# Patient Record
Sex: MALE | Race: WHITE | NOT HISPANIC OR LATINO | Employment: OTHER | URBAN - METROPOLITAN AREA
[De-identification: names, ages, dates, MRNs, and addresses within clinical notes are randomized per-mention and may not be internally consistent; named-entity substitution may affect disease eponyms.]

---

## 2017-01-24 ENCOUNTER — OFFICE VISIT (OUTPATIENT)
Dept: FAMILY MEDICINE | Facility: CLINIC | Age: 72
End: 2017-01-24

## 2017-01-24 VITALS
SYSTOLIC BLOOD PRESSURE: 164 MMHG | HEIGHT: 69 IN | TEMPERATURE: 97.6 F | HEART RATE: 70 BPM | BODY MASS INDEX: 28.88 KG/M2 | WEIGHT: 195 LBS | DIASTOLIC BLOOD PRESSURE: 101 MMHG | OXYGEN SATURATION: 96 %

## 2017-01-24 DIAGNOSIS — E78.5 HYPERLIPIDEMIA LDL GOAL <130: ICD-10-CM

## 2017-01-24 DIAGNOSIS — Z11.59 NEED FOR HEPATITIS C SCREENING TEST: ICD-10-CM

## 2017-01-24 DIAGNOSIS — Z00.00 PREVENTATIVE HEALTH CARE: Primary | ICD-10-CM

## 2017-01-24 DIAGNOSIS — R73.03 PREDIABETES: ICD-10-CM

## 2017-01-24 DIAGNOSIS — Z12.5 SCREENING FOR PROSTATE CANCER: ICD-10-CM

## 2017-01-24 DIAGNOSIS — B95.8 STAPHYLOCOCCUS INFECTION OF NOSE: ICD-10-CM

## 2017-01-24 DIAGNOSIS — J34.89 STAPHYLOCOCCUS INFECTION OF NOSE: ICD-10-CM

## 2017-01-24 DIAGNOSIS — Z13.6 SCREENING FOR HYPERTENSION: ICD-10-CM

## 2017-01-24 LAB
ALT SERPL W P-5'-P-CCNC: 36 U/L (ref 0–70)
AST SERPL W P-5'-P-CCNC: 19 U/L (ref 0–45)
BUN SERPL-MCNC: 13 MG/DL (ref 7–30)
CALCIUM SERPL-MCNC: 9.2 MG/DL (ref 8.5–10.4)
CHLORIDE SERPLBLD-SCNC: 100 MMOL/L (ref 94–109)
CHOLEST SERPL-MCNC: 204 MG/DL
CO2 SERPL-SCNC: 29 MMOL/L (ref 20–32)
CREAT SERPL-MCNC: 1.2 MG/DL (ref 0.8–1.5)
EGFR CALCULATED (BLACK REFERENCE): 77.2
EGFR CALCULATED (NON BLACK REFERENCE): 63.8
GLUCOSE SERPL-MCNC: 105 MG/DL (ref 70–99)
GLUCOSE SERPL-MCNC: 120 MG/DL (ref 60–109)
HBA1C MFR BLD: 7 % (ref 4.1–5.7)
HDLC SERPL-MCNC: 47 MG/DL
LDLC SERPL CALC-MCNC: 125 MG/DL
NONHDLC SERPL-MCNC: 158 MG/DL
POTASSIUM SERPL-SCNC: 4.5 MMOL/L (ref 3.4–5.3)
PSA SERPL-ACNC: 4.45 UG/L (ref 0–4)
SODIUM SERPL-SCNC: 133 MMOL/L (ref 133–144)
TRIGL SERPL-MCNC: 162 MG/DL

## 2017-01-24 RX ORDER — MUPIROCIN CALCIUM 20 MG/G
CREAM TOPICAL
Qty: 15 G | Refills: 0 | Status: SHIPPED | OUTPATIENT
Start: 2017-01-24 | End: 2017-01-25 | Stop reason: ALTCHOICE

## 2017-01-24 ASSESSMENT — PAIN SCALES - GENERAL: PAINLEVEL: NO PAIN (0)

## 2017-01-24 NOTE — NURSING NOTE
"71 year old  Chief Complaint   Patient presents with     Physical     Fall Risk Assessment questions.       Blood pressure 154/97, pulse 70, temperature 97.6  F (36.4  C), height 5' 9.06\" (175.4 cm), weight 195 lb (88.451 kg), SpO2 96 %. Body mass index is 28.75 kg/(m^2).  Patient Active Problem List   Diagnosis     Preventative health care     Hyperlipidemia LDL goal <130     Prediabetes       Wt Readings from Last 2 Encounters:   01/24/17 195 lb (88.451 kg)   01/21/16 192 lb 12 oz (87.431 kg)     BP Readings from Last 3 Encounters:   01/24/17 154/97   01/21/16 133/88   10/13/15 132/85         Current Outpatient Prescriptions   Medication     Bilberry, Vaccinium myrtillus, 150 MG CAPS     omeprazole (PRILOSEC) 20 MG capsule     pravastatin (PRAVACHOL) 20 MG tablet     Cetirizine HCl (ZYRTEC ALLERGY PO)     GLUCOSAMINE CHONDROITIN COMPLX PO     Cholecalciferol (VITAMIN D) 2000 UNITS tablet     aspirin 81 MG tablet     No current facility-administered medications for this visit.       Social History   Substance Use Topics     Smoking status: Never Smoker      Smokeless tobacco: Not on file     Alcohol Use: Yes       Health Maintenance Due   Topic Date Due     ADVANCE DIRECTIVE PLANNING Q5 YRS (NO INBASKET)  08/01/1965     HEPATITIS C SCREENING  08/01/1965     FALL RISK ASSESSMENT  08/01/2012     AORTIC ANEURYSM SCREENING (SYSTEM ASSIGNED)  08/01/2012     PNEUMOCOCCAL (2 of 2 - PCV13) 08/23/2014     INFLUENZA VACCINE (SYSTEM ASSIGNED)  09/01/2016       No results found for this basename: pap      January 24, 2017 12:39 PM  "

## 2017-01-24 NOTE — PROGRESS NOTES
"CHIEF COMPLAINT:  Physical exam.      HISTORY OF PRESENT ILLNESS:  Agustin is a 71-year-old gentleman here for the above.  Overall, he is doing very well.  He is enjoying halfway.  He has his first grandchild who is just over a year of age.  His mom, also here for her physical exam right after him, is now 94 years old and is living in [facility] in [place].  She is doing quite well.      Agustin has no history of hypertension but he sure had it today.  Initial BP was 154/97 and second was 164/101.  He is completely asymptomatic.  The last 2 readings checked over the last 15 months were 132-133/85-88.  He has 2 cuffs at home, but has not been checking his blood pressure.  He will do so as outlined below.  He would prefer to stay off medication if possible.      He has hyperlipidemia, on Pravastatin 20 mg once daily.  He also has some prediabetes that we have not treated at this point.  We will see what his A1c is today.      He would like me to do a careful skin survey.  He has a number of skin changes.  I had recommended about a year ago that he buy one of the over-the-counter liquid nitrogen kits to treat a few of these lesions (all seborrheic keratoses), and he bought it but never used it.  He may try and do it this year.      He has had no falls at home, whatsoever.  He is very physically active.  No adaptive devices.  Depression screening is negative.  He is able to do all activities of daily living.  He owns a gun.  He is not wearing it today.  No significant memory concerns.  The only time it comes up is if he is trying to remember an actor's name or a film that he saw.  This is really the only part of his daily life that is affected by that.      HEALTHCARE MAINTENANCE:  He has new glasses.  He gets his eyes checked on a regular basis.  No increased intraocular pressure or early cataracts at this point.  He has bilateral hearing aids.  They are \"never great,\" but they seem to work okay.  Nose is " "irritated at times.  He gets some \"chunks\" out of there and then it can bleed.  I will take a look.  Dental care is up-to-date.  He needs to have 4 of his lower teeth taken care of soon and will be doing so.  Blood pressure 154/97 and then 164/101 as mentioned.  Body mass index is 28.75.  Weight is up 3 pounds from a year ago.  Immunizations reviewed.  Completely up-to-date.  He already had a flu shot this year at [facility].  Labs will consist of a hepatitis C antibody for 1 time screening purposes, A1c due to the prediabetes, PSA 50 for screening purposes, basic metabolic panel due to his elevated blood pressure and a lipid panel plus for monitoring purposes.  Colon cancer screening is up-to-date until 10/2020.  Hep C screening is mentioned already.      REVIEW OF SYSTEMS:  A 10-point review is negative.      OBJECTIVE:  Agustin is in absolutely no distress.  Breathing comfortably.  Good affect.   VITAL SIGNS:  -164/ on 2 occasions, in that order.  We did not check a third time as he was drinking coffee after he had it checked.  Pulse 70 and regular.  Temperature is 97.6.  He is 5 feet 9 and weighs 185 with a BMI of 28.75.  O2 sats are 96% on room air.   HEENT:  He has no pain with palpation of the frontal or maxillary sinuses.  Nose reveals a sore, red, slightly crusty appearance on the septum bilaterally.  I can tell that he had been bleeding not that long ago.  The nose otherwise looks fine.  Ears look fine.  He removed his hearing aids and there is absolutely no wax.  Teeth in good repair.  Mucous membranes are moist.  Throat looks benign.  Neck is supple, free of any adenopathy or thyromegaly.  No carotid bruits are heard, no JVD.   LUNGS:  Clear to auscultation bilaterally.   HEART:  Reveals a regular rate and rhythm without murmur.   EXTREMITIES:  Ankles are free of any edema.  Lower legs are mostly hairless.  He has outstanding dorsalis pedis pulses bilaterally on his feet.   SKIN:  Careful examination " of the legs reveals no unusual lesions.  Trunk reveals multiple seborrheic keratoses, most on his back.  He has a couple on his left temple/forehead region.  The largest one or the most concerning one for him is a large, dark brown, well-demarcated, slightly raised and dry lesion anterior to the trapezius on his left, behind the collarbone.      LABS:  Pending.      ASSESSMENT AND PLAN:   1.  Adult physical exam, up-to-date with healthcare maintenance strategies.   2.  Hepatitis C antibody for 1 time screening purposes.   3.  History of hyperlipidemia, on Pravachol, results pending.   4.  History of prediabetes.  A1c pending.   5.  Skin survey carried out.  Multiple seborrheic keratoses are seen but nothing suspicious.  He was reassured by that.   6.  Elevated blood pressure, likely hypertension.  He is going to check at home and let me know after he has obtained 12-14 data points.  Based on that, we can decide next steps.  We will likely start lisinopril 5 mg once daily, have him check, and make adjustments every 2-4 weeks.   7.  Staph overgrowth/infection of the nose.  Bactroban (mupirocin) 2% cream sent in, a small amount in each nostril, especially along the septum, nightly for the next 14 days.  He will let me know how that works.  Call with any problems or questions.

## 2017-01-24 NOTE — MR AVS SNAPSHOT
After Visit Summary   1/24/2017    Agustin Nolan    MRN: [id number]           Patient Information     Date Of Birth          1945        Visit Information        Provider Department      1/24/2017 1:00 PM Vicente Tomlin MD [facility]        Today's Diagnoses     Preventative health care    -  1     Need for hepatitis C screening test         Hyperlipidemia LDL goal <130         Prediabetes         Screening for prostate cancer         Screening for hypertension         Staphylococcus infection of nose           Care Instructions      Preventive Health Recommendations:       Male Ages 65 and over    Yearly exam:             See your health care provider every year in order to  o   Review health changes.   o   Discuss preventive care.    o   Review your medicines if your doctor has prescribed any.    Talk with your health care provider about whether you should have a test to screen for prostate cancer (PSA).    Every 3 years, have a diabetes test (fasting glucose). If you are at risk for diabetes, you should have this test more often.    Every 5 years, have a cholesterol test. Have this test more often if you are at risk for high cholesterol or heart disease.     Every 10 years, have a colonoscopy. Or, have a yearly FIT test (stool test). These exams will check for colon cancer.    Talk to with your health care provider about screening for Abdominal Aortic Aneurysm if you have a family history of AAA or have a history of smoking.  Shots:     Get a flu shot each year.     Get a tetanus shot every 10 years.     Talk to your doctor about your pneumonia vaccines. There are now two you should receive - Pneumovax (PPSV 23) and Prevnar (PCV 13).    Talk to your doctor about a shingles vaccine.     Talk to your doctor about the hepatitis B vaccine.  Nutrition:     Eat at least 5 servings of fruits and vegetables each day.     Eat whole-grain bread, whole-wheat pasta and brown rice instead of  white grains and rice.     Talk to your doctor about Calcium and Vitamin D.   Lifestyle    Exercise for at least 150 minutes a week (30 minutes a day, 5 days a week). This will help you control your weight and prevent disease.     Limit alcohol to one drink per day.     No smoking.     Wear sunscreen to prevent skin cancer.     See your dentist every six months for an exam and cleaning.     See your eye doctor every 1 to 2 years to screen for conditions such as glaucoma, macular degeneration and cataracts.        Follow-ups after your visit        Follow-up notes from your care team     Return if symptoms worsen or fail to improve.      Who to contact     Please call your clinic at [phone] to:    Ask questions about your health    Make or cancel appointments    Discuss your medicines    Learn about your test results    Speak to your doctor   If you have compliments or concerns about an experience at your clinic, or if you wish to file a complaint, please contact [facility] Patient Relations at [phone] or email us at [email or web address]         Additional Information About Your Visit        [employer] [place] Information     [employer] gives you secure access to your electronic health record. If you see a primary care provider, you can also send messages to your care team and make appointments. If you have questions, please call your primary care clinic.  If you do not have a primary care provider, please call [phone] and they will assist you.      [employer] is an electronic gateway that provides easy, online access to your medical records. With [employer]oo, you can request a clinic appointment, read your test results, renew a prescription or communicate with your care team.     To access your existing account, please contact your [facility] or call [phone] for assistance.        Care EveryWhere ID     This is your Care EveryWhere ID. This could be  "used by other organizations to access your [place] medical records  [id number]        Your Vitals Were     Pulse Temperature Height BMI (Body Mass Index) Pulse Oximetry       70 97.6  F (36.4  C) 5' 9.05\" (175.4 cm) 28.75 kg/m2 96%        Blood Pressure from Last 3 Encounters:   01/24/17 164/101   01/21/16 133/88   10/13/15 132/85    Weight from Last 3 Encounters:   01/24/17 195 lb (88.451 kg)   01/21/16 192 lb 12 oz (87.431 kg)   10/13/15 184 lb (83.462 kg)              We Performed the Following     Basic Metabolic Panel ([place])     Hemoglobin A1c ([place])     Hepatitis C Screen Reflex to HCV RNA Quant and Genotype     Lipid Panel Plus ([place])     Prostate spec antigen screen          Today's Medication Changes          These changes are accurate as of: 1/24/17  2:58 PM.  If you have any questions, ask your nurse or doctor.               Start taking these medicines.        Dose/Directions    mupirocin 2 % cream   Commonly known as:  BACTROBAN   Used for:  Staphylococcus infection of nose   Started by:  Vicente Tomlin MD        Place small amount in each nostril nightly for 14 days   Quantity:  15 g   Refills:  0            Where to get your medicines      These medications were sent to [facility]/pharmacy #[id number] [place], [place] - [address]  [address] [address]     Phone:  [phone]    - mupirocin 2 % cream             Primary Care Provider Office Phone # Fax #    Vicente Tomlin [phone]       [place] [address] [facility] [address]        Thank you!     Thank you for choosing [facility]  for your care. Our goal is always to provide you with excellent care. Hearing back from our patients is one way we can continue to improve our services. Please take a few minutes to complete the written survey that you may receive in the mail after your visit with us. Thank you!             Your Updated Medication List - Protect others around " you: Learn how to safely use, store and throw away your medicines at www.disposemymeds.org.          This list is accurate as of: 1/24/17  2:58 PM.  Always use your most recent med list.                   Brand Name Dispense Instructions for use    aspirin 81 MG tablet      Take 1 tablet by mouth daily       Bilberry (Vaccinium myrtillus) 150 MG Caps      Take 160 mg by mouth       GLUCOSAMINE CHONDROITIN COMPLX PO      Take by mouth 2 times daily       mupirocin 2 % cream    BACTROBAN    15 g    Place small amount in each nostril nightly for 14 days       omeprazole 20 MG CR capsule    priLOSEC    90 capsule    Take 1 capsule (20 mg) by mouth daily       pravastatin 20 MG tablet    PRAVACHOL    90 tablet    Take 1 tablet (20 mg) by mouth daily       vitamin D 2000 UNITS tablet      Take 1 tablet by mouth daily       ZYRTEC ALLERGY PO

## 2017-01-24 NOTE — NURSING NOTE
"71 year old  Chief Complaint   Patient presents with     Physical       Blood pressure 154/97, pulse 70, temperature 97.6  F (36.4  C), height 5' 9.06\" (175.4 cm), weight 195 lb (88.451 kg), SpO2 96 %. Body mass index is 28.75 kg/(m^2).  Patient Active Problem List   Diagnosis     Preventative health care     Hyperlipidemia LDL goal <130     Prediabetes       Wt Readings from Last 2 Encounters:   01/24/17 195 lb (88.451 kg)   01/21/16 192 lb 12 oz (87.431 kg)     BP Readings from Last 3 Encounters:   01/24/17 154/97   01/21/16 133/88   10/13/15 132/85         Current Outpatient Prescriptions   Medication     Bilberry, Vaccinium myrtillus, 150 MG CAPS     omeprazole (PRILOSEC) 20 MG capsule     pravastatin (PRAVACHOL) 20 MG tablet     Cetirizine HCl (ZYRTEC ALLERGY PO)     GLUCOSAMINE CHONDROITIN COMPLX PO     Cholecalciferol (VITAMIN D) 2000 UNITS tablet     aspirin 81 MG tablet     No current facility-administered medications for this visit.       Social History   Substance Use Topics     Smoking status: Never Smoker      Smokeless tobacco: Not on file     Alcohol Use: Yes       Health Maintenance Due   Topic Date Due     ADVANCE DIRECTIVE PLANNING Q5 YRS (NO INBASKET)  08/01/1965     HEPATITIS C SCREENING  08/01/1965     FALL RISK ASSESSMENT  08/01/2012     AORTIC ANEURYSM SCREENING (SYSTEM ASSIGNED)  08/01/2012     PNEUMOCOCCAL (2 of 2 - PCV13) 08/23/2014     INFLUENZA VACCINE (SYSTEM ASSIGNED)  09/01/2016       No results found for this basename: pap      January 24, 2017 12:39 PM  "

## 2017-01-25 DIAGNOSIS — J34.89 STAPHYLOCOCCUS INFECTION OF NOSE: Primary | ICD-10-CM

## 2017-01-25 DIAGNOSIS — B95.8 STAPHYLOCOCCUS INFECTION OF NOSE: Primary | ICD-10-CM

## 2017-01-25 LAB — HCV AB SERPL QL IA: NORMAL

## 2017-02-03 DIAGNOSIS — E78.5 HYPERLIPIDEMIA LDL GOAL <100: ICD-10-CM

## 2017-02-03 DIAGNOSIS — E78.49 OTHER HYPERLIPIDEMIA: Primary | ICD-10-CM

## 2017-02-03 RX ORDER — PRAVASTATIN SODIUM 40 MG
40 TABLET ORAL DAILY
Qty: 90 TABLET | Refills: 3 | Status: SHIPPED | OUTPATIENT
Start: 2017-02-03 | End: 2017-05-12

## 2017-02-03 NOTE — TELEPHONE ENCOUNTER
Per Sada, recent  Lipid panel reviewed, he would like to increase to 40 mg daily.  To give 90 day supply with refills for a year per MD.  No need for repeat labs work.    Aisha Crouch RN  February 3, 2017 10:00 AM

## 2017-02-24 DIAGNOSIS — K21.9 GASTROESOPHAGEAL REFLUX DISEASE, ESOPHAGITIS PRESENCE NOT SPECIFIED: ICD-10-CM

## 2017-03-10 ENCOUNTER — DOCUMENTATION ONLY (OUTPATIENT)
Dept: VASCULAR SURGERY | Facility: CLINIC | Age: 72
End: 2017-03-10

## 2017-03-10 DIAGNOSIS — Z13.6 ENCOUNTER FOR ABDOMINAL AORTIC ANEURYSM (AAA) SCREENING: Primary | ICD-10-CM

## 2017-03-13 ENCOUNTER — OFFICE VISIT (OUTPATIENT)
Dept: FAMILY MEDICINE | Facility: CLINIC | Age: 72
End: 2017-03-13

## 2017-03-13 VITALS
WEIGHT: 196.75 LBS | BODY MASS INDEX: 29.01 KG/M2 | HEART RATE: 81 BPM | DIASTOLIC BLOOD PRESSURE: 105 MMHG | OXYGEN SATURATION: 96 % | SYSTOLIC BLOOD PRESSURE: 148 MMHG | TEMPERATURE: 97.6 F

## 2017-03-13 DIAGNOSIS — R03.0 ELEVATED BLOOD PRESSURE READING WITHOUT DIAGNOSIS OF HYPERTENSION: Primary | ICD-10-CM

## 2017-03-13 DIAGNOSIS — E78.5 HYPERLIPIDEMIA LDL GOAL <130: ICD-10-CM

## 2017-03-13 DIAGNOSIS — E11.9 TYPE 2 DIABETES MELLITUS WITHOUT COMPLICATION, WITHOUT LONG-TERM CURRENT USE OF INSULIN (H): ICD-10-CM

## 2017-03-13 DIAGNOSIS — R73.03 PREDIABETES: ICD-10-CM

## 2017-03-13 LAB
ALT SERPL W P-5'-P-CCNC: 30 U/L (ref 0–70)
AST SERPL W P-5'-P-CCNC: 15 U/L (ref 0–45)
CHOLEST SERPL-MCNC: 199 MG/DL
GLUCOSE SERPL-MCNC: 203 MG/DL (ref 70–99)
HBA1C MFR BLD: 6.9 % (ref 4.1–5.7)
HDLC SERPL-MCNC: 40 MG/DL
LDLC SERPL CALC-MCNC: 98 MG/DL
NONHDLC SERPL-MCNC: 159 MG/DL
TRIGL SERPL-MCNC: 305 MG/DL

## 2017-03-13 ASSESSMENT — PAIN SCALES - GENERAL: PAINLEVEL: NO PAIN (0)

## 2017-03-13 NOTE — MR AVS SNAPSHOT
After Visit Summary   3/13/2017    Agustin Nolan    MRN: [id number]           Patient Information     Date Of Birth          1945        Visit Information        Provider Department      3/13/2017 9:20 AM Vicente Tomlin MD [facility]        Today's Diagnoses     Prediabetes    -  1    Hyperlipidemia LDL goal <130           Follow-ups after your visit        Who to contact     Please call your clinic at [phone] to:    Ask questions about your health    Make or cancel appointments    Discuss your medicines    Learn about your test results    Speak to your doctor   If you have compliments or concerns about an experience at your clinic, or if you wish to file a complaint, please contact [facility] Patient Relations at [phone] or email us at [email or web address]         Additional Information About Your Visit        MyChart Information     [employer] gives you secure access to your electronic health record. If you see a primary care provider, you can also send messages to your care team and make appointments. If you have questions, please call your primary care clinic.  If you do not have a primary care provider, please call [phone] and they will assist you.      [employer] is an electronic gateway that provides easy, online access to your medical records. With [employer], you can request a clinic appointment, read your test results, renew a prescription or communicate with your care team.     To access your existing account, please contact your [facility] or call [phone] for assistance.        Care EveryWhere ID     This is your Care EveryWhere ID. This could be used by other organizations to access your [place] medical records  [id number]        Your Vitals Were     Pulse Temperature Pulse Oximetry BMI (Body Mass Index)          81 97.6  F (36.4  C) (Oral) 96% 29.01 kg/m2         Blood Pressure from Last 3  Encounters:   03/13/17 (!) 148/105   01/24/17 (!) 164/101   01/21/16 133/88    Weight from Last 3 Encounters:   03/13/17 196 lb 12 oz (89.2 kg)   01/24/17 195 lb (88.5 kg)   01/21/16 192 lb 12 oz (87.4 kg)              We Performed the Following     ALT     AST     Glucose     Hemoglobin A1c ([place])     Lipid Profile        Primary Care Provider Office Phone # Fax #    Vicente Tomlin [phone]       [facility] [address] [place] [address]        Thank you!     Thank you for choosing [facility]  for your care. Our goal is always to provide you with excellent care. Hearing back from our patients is one way we can continue to improve our services. Please take a few minutes to complete the written survey that you may receive in the mail after your visit with us. Thank you!             Your Updated Medication List - Protect others around you: Learn how to safely use, store and throw away your medicines at www.disposemymeds.org.          This list is accurate as of: 3/13/17 10:42 AM.  Always use your most recent med list.                   Brand Name Dispense Instructions for use    aspirin 81 MG tablet      Take 1 tablet by mouth daily       Bilberry (Vaccinium myrtillus) 150 MG Caps      Take 160 mg by mouth       GLUCOSAMINE CHONDROITIN COMPLX PO      Take by mouth 2 times daily       mupirocin 2 % nasal ointment    BACTROBAN NASAL    10 g    Apply small amount in each nostril nightly X 14 days       omeprazole 20 MG CR capsule    priLOSEC    90 capsule    Take 1 capsule (20 mg) by mouth daily       pravastatin 40 MG tablet    PRAVACHOL    90 tablet    Take 1 tablet (40 mg) by mouth daily       vitamin D 2000 UNITS tablet      Take 1 tablet by mouth daily       ZYRTEC ALLERGY PO

## 2017-03-13 NOTE — NURSING NOTE
71 year old  Chief Complaint   Patient presents with     Diabetes       Blood pressure (!) 145/92, pulse 81, temperature 97.6  F (36.4  C), temperature source Oral, weight 196 lb 12 oz (89.2 kg), SpO2 96 %. Body mass index is 29.01 kg/(m^2).  Patient Active Problem List   Diagnosis     Preventative health care     Hyperlipidemia LDL goal <130     Prediabetes       Wt Readings from Last 2 Encounters:   03/13/17 196 lb 12 oz (89.2 kg)   01/24/17 195 lb (88.5 kg)     BP Readings from Last 3 Encounters:   03/13/17 (!) 145/92   01/24/17 (!) 164/101   01/21/16 133/88         Current Outpatient Prescriptions   Medication     omeprazole (PRILOSEC) 20 MG CR capsule     pravastatin (PRAVACHOL) 40 MG tablet     mupirocin (BACTROBAN NASAL) 2 % nasal ointment     Bilberry, Vaccinium myrtillus, 150 MG CAPS     Cetirizine HCl (ZYRTEC ALLERGY PO)     GLUCOSAMINE CHONDROITIN COMPLX PO     Cholecalciferol (VITAMIN D) 2000 UNITS tablet     aspirin 81 MG tablet     No current facility-administered medications for this visit.        Social History   Substance Use Topics     Smoking status: Never Smoker     Smokeless tobacco: Not on file     Alcohol use Yes       Health Maintenance Due   Topic Date Due     ADVANCE DIRECTIVE PLANNING Q5 YRS (NO INBASKET)  08/01/1965     PNEUMOCOCCAL (2 of 2 - PCV13) 08/23/2014       No results found for: PAP      March 13, 2017 9:03 AM

## 2017-03-14 NOTE — PROGRESS NOTES
CHIEF COMPLAINT:  Followup regarding elevated blood pressure, new diagnosis of diabetes and hyperlipidemia.      HISTORY OF PRESENT ILLNESS:  Agustin is a 71-year-old gentleman who was here for a physical exam approximately 2 months ago.  At that time, it appeared he was heading toward metabolic syndrome with a brand new diagnosis of type 2 diabetes (A1c was 7.0%), hypertension (blood pressure 164/101) and elevation of his lipids.  He is on pravastatin 20 mg daily and we increased the dose to 40 mg a day.  We agreed to tackle one thing at a time.      Since that visit, he purchased a home blood pressure cuff and he has been checking it regularly.  He answered all the data into an Excel spread sheet.  Since 01/25, he has checked his blood pressure dozens of times.  He showed me both morning and evening statistics.  At this point, his morning blood pressure is averaging approximately 118/83 and his evening readings are averaging approximately 127/89.  He really does not want to take an antihypertensive medication if we can avoid it.      We went through all his labs.  We are going to recheck some labs today.  He has not made any dietary changes or exercise changes.  He knows he will need to do that.      ACTIVE MEDICAL PROBLEMS:  Reviewed.      CURRENT MEDICATIONS:     1. Reviewed.      OBJECTIVE:  Agustin is in no distress.  Blood pressure in the office today was 145/92.  Average at home much better of course.  Pulse 81.  He is afebrile with temperature 97.6.  He weighs 196 pounds.  O2 sats are 96% on room air.  BMI 29.01.      LABORATORY DATA:  Labs today will include a lipid panel plus, as well as an A1c.  He will be notified of those results.      ASSESSMENT AND PLAN:   1. Elevated blood pressure without a diagnosis of hypertension given that his averages at home are in the 120s/70s-80s.  We will continue to monitor on a regular basis.  At some point we will likely want to start low dose ACE inhibitor such as  lisinopril 5 mg once daily.     2. He had a diagnosis of prediabetes, but now it is clear that he has type 2 diabetes.  A1c came back at 6.9%.  I am going to give him 3 months to work on diet and exercise to get that less than 6.5%.  If he is unable to do that, we will start on metformin  mg once daily with breakfast and increase as needed.   3. History of hyperlipidemia.  I am happy to report that by doubling his pravastatin from 20 to 40 mg daily his total cholesterol was 199 with an LDL of 98.   4. Given everything, A1c is less than 7%.  Blood pressure at home is in the 120s/80s.  LDL was 98.  He does not smoke and he is on aspirin 81 mg daily.  Therefore, he is really at optimal care already.  Nevertheless, we will work with him closely and I will see him back in approximately 3 months.

## 2017-03-17 DIAGNOSIS — E11.9 DIABETES MELLITUS, TYPE 2 (H): Primary | ICD-10-CM

## 2017-03-30 ENCOUNTER — ALLIED HEALTH/NURSE VISIT (OUTPATIENT)
Dept: EDUCATION SERVICES | Facility: CLINIC | Age: 72
End: 2017-03-30
Payer: MEDICARE

## 2017-03-30 VITALS — BODY MASS INDEX: 28.66 KG/M2 | WEIGHT: 194.4 LBS

## 2017-03-30 DIAGNOSIS — E11.9 DIABETES MELLITUS, TYPE 2 (H): Primary | ICD-10-CM

## 2017-03-30 PROCEDURE — G0108 DIAB MANAGE TRN  PER INDIV: HCPCS

## 2017-03-30 RX ORDER — LANCETS
EACH MISCELLANEOUS
Qty: 1 BOX | Refills: 5 | Status: SHIPPED | OUTPATIENT
Start: 2017-03-30 | End: 2017-04-20

## 2017-03-30 NOTE — Clinical Note
Hi Dr. Tomlin,  Thank you for the referral.  Agustin will start checking his blood sugars and he is following up in 3 weeks.  He has already made a lot of diet & activity changes.   Thanks!   Sheryl Leon RD, LD  Diabetes Education

## 2017-03-30 NOTE — MR AVS SNAPSHOT
"              After Visit Summary   3/30/2017    Agustin Nolan    MRN: [id number]           Patient Information     Date Of Birth          1945        Visit Information        Provider Department      3/30/2017 1:00 PM  DIABETIC ED RESOURCE [facility]        Today's Diagnoses     Diabetes mellitus, type 2 (H)    -  1      Care Instructions    Goals:     1.  Aim for 3-5 carb servings at meals and 0-2 carb servings at snacks    Grams of Carbohydrate \"Carb Choices\"    15 1   30 2   45 3   60 4   75 5    Total grams of carbohydrate ÷ 15  =  carb choices                                   Example:  39 grams of carbohydrate ÷  15 =  2.6 carb choices    2.  Aim for 30 minutes of exercise 5 weekly.   Add in strength training (check out the [employer])     3.  Continue to check your blood sugar and please bring meter and logbook with you to all doctor and follow-up appointments.     4.  Try to add in some plain water!     [place] [facility] for the [facility]:  For Your Diabetes Education Appointments Call:  [phone]    For Diabetes Education Related Questions:    Phone: [phone]   [email or web address]     Shazia Leon RD, LD        myfitnesspal  calorieking    Google images (for labels)       [employer] has an over the counter brand called Relion (Prime is the meter)           Follow-ups after your visit        Your next 10 appointments already scheduled     Apr 20, 2017  1:00 PM CDT   Diabetic Education with  DIABETIC ED RESOURCE   [facility] ([facility])    [address] [address] [phone]              Who to contact     If you have questions or need follow up information about today's clinic visit or your schedule please contact [facility] directly at [phone].  Normal or non-critical lab and imaging results will be communicated to you by MyChart, letter or phone within " 4 business days after the clinic has received the results. If you do not hear from us within 7 days, please contact the clinic through [facility] or phone. If you have a critical or abnormal lab result, we will notify you by phone as soon as possible.  Submit refill requests through [facility] or call your pharmacy and they will forward the refill request to us. Please allow 3 business days for your refill to be completed.          Additional Information About Your Visit        [facility] Information     [facility] gives you secure access to your electronic health record. If you see a primary care provider, you can also send messages to your care team and make appointments. If you have questions, please call your primary care clinic.  If you do not have a primary care provider, please call [phone] and they will assist you.        Care EveryWhere ID     This is your Care EveryWhere ID. This could be used by other organizations to access your [place] medical records  [id number]         Blood Pressure from Last 3 Encounters:   03/13/17 (!) 148/105   01/24/17 (!) 164/101   01/21/16 133/88    Weight from Last 3 Encounters:   03/13/17 196 lb 12 oz (89.2 kg)   01/24/17 195 lb (88.5 kg)   01/21/16 192 lb 12 oz (87.4 kg)              Today, you had the following     No orders found for display         Today's Medication Changes          These changes are accurate as of: 3/30/17  2:22 PM.  If you have any questions, ask your nurse or doctor.               Start taking these medicines.        Dose/Directions    blood glucose monitoring lancets   Used for:  Diabetes mellitus, type 2 (H)        Use to check blood sugar 2 times daily   Quantity:  1 Box   Refills:  5       blood glucose monitoring test strip   Commonly known as:  ACCU-CHEK JHON PLUS   Used for:  Diabetes mellitus, type 2 (H)        Test blood sugar 2 times daily   Quantity:  100 each   Refills:  3            Where to get your medicines      These medications were  sent to [facility]/pharmacy #[id number] - [place], [place] - [address]  [address] [address]     Phone:  [phone]     blood glucose monitoring lancets    blood glucose monitoring test strip                Primary Care Provider Office Phone # Fax #    Vicente Tomlin [phone]       [facility] [address] [facility] [address]  [facility] [address]        Thank you!     Thank you for choosing [facility]  for your care. Our goal is always to provide you with excellent care. Hearing back from our patients is one way we can continue to improve our services. Please take a few minutes to complete the written survey that you may receive in the mail after your visit with us. Thank you!             Your Updated Medication List - Protect others around you: Learn how to safely use, store and throw away your medicines at www.disposemymeds.org.          This list is accurate as of: 3/30/17  2:22 PM.  Always use your most recent med list.                   Brand Name Dispense Instructions for use    aspirin 81 MG tablet      Take 1 tablet by mouth daily       Bilberry (Vaccinium myrtillus) 150 MG Caps      Take 160 mg by mouth       blood glucose monitoring lancets     1 Box    Use to check blood sugar 2 times daily       blood glucose monitoring test strip    ACCU-CHEK JHON PLUS    100 each    Test blood sugar 2 times daily       GLUCOSAMINE CHONDROITIN COMPLX PO      Take by mouth 2 times daily       mupirocin 2 % nasal ointment    BACTROBAN NASAL    10 g    Apply small amount in each nostril nightly X 14 days       omeprazole 20 MG CR capsule    priLOSEC    90 capsule    Take 1 capsule (20 mg) by mouth daily       pravastatin 40 MG tablet    PRAVACHOL    90 tablet    Take 1 tablet (40 mg) by mouth daily       vitamin D 2000 UNITS tablet      Take 1 tablet by mouth daily       ZYRTEC ALLERGY PO

## 2017-03-30 NOTE — PATIENT INSTRUCTIONS
"Goals:     1.  Aim for 3-5 carb servings at meals and 0-2 carb servings at snacks    Grams of Carbohydrate \"Carb Choices\"    15 1   30 2   45 3   60 4   75 5    Total grams of carbohydrate ÷ 15  =  carb choices                                   Example:  39 grams of carbohydrate ÷  15 =  2.6 carb choices    2.  Aim for 30 minutes of exercise 5 weekly.   Add in strength training (check out the [employer])     3.  Continue to check your blood sugar and please bring meter and logbook with you to all doctor and follow-up appointments.     4.  Try to add in some plain water!     [place] [facility] for the [facility] Area:  For Your Diabetes Education Appointments Call:  [phone]    For Diabetes Education Related Questions:    Phone: [phone]   [email or web address]     Shazia Leon RD, LD        myfitnesspal  calorieking    Google images (for labels)       [employer] has an over the counter brand called Relion (Prime is the meter)     "

## 2017-03-30 NOTE — PROGRESS NOTES
Agree with plan and educator may order test strips and lancets for new diagnosis of diabetes.     Kim (Hari) June, MARIELY LD CDE

## 2017-03-30 NOTE — PROGRESS NOTES
Diabetes Self Management Training: Initial Assessment Visit for Newly Diagnosed Patients (Complete AADE Goals Flowsheet)    Agustin Nolan presents today for education related to Type 2 diabetes. He is accompanied by self. Patient's diabetes management related comments/concerns: to get checking blood sugars. Patient's emotional response to diabetes: expresses readiness to learn. Patient would like this visit to be focused around the following diabetes-related behaviors and goals: Diabetes pathophysiology, Being Active and Monitoring    ASSESSMENT:  Patient Problem List and Family Medical History reviewed for relevant medical history, current medical status, and diabetes risk factors.    Current Diabetes Management per Patient:  Taking diabetes medications? no    Past Diabetes Education: Newly diagnosed    Patient glucose self monitoring as follows: BG meter taught today.   BG meter: Accu-Chek Briana Connect meter    Patient's most recent   Lab Results   Component Value Date    A1C 6.9 03/13/2017    is meeting goal of <7.0    Nutrition:  Patient eats 3 meals per day.    Changes: stopped glazed donut for breakfast.  Cut out the strawberry shake at [facility] [employer].  Added more fresh fruit     Breakfast - 2 pecan shortbread cookies, 2 cups hazelnu offee (black) with milk, 1 banana  Lunch - [employer] fish, fries, pop (diet cherry coke)  - Mondays only.     Dinner - 1 cup tuna salad peas, toasted bun, 1 hazelnut coffee (black)  with milk , 1/3 bottle cab, apple chocolate biscotti   Snacks - bisctotti OR 1 mini twix     Beverages: 5 diet cherry cokes/day and 3 cups coffee, 1-2 cups cabernet a few times a week.  Rarely drinks hard alcohol     Cultural/Adventist diet restrictions: No     Biggest Challenge to Healthy Eating: knowing what to eat and enjoys sweets     Physical Activity:    Has been going on 4.5 mile walks.   Is going to start biking.  May look into the [facility] for variety.     Diabetes Risk Factors:  family history, age  "over 45 years, hypertension, hyperlipidemia and hypertriglyceridemia    Diabetes Complications:  Not discussed today.    Vitals:  Wt 194 lb 6.4 oz (88.2 kg)  BMI 28.66 kg/m2  Estimated body mass index is 28.66 kg/(m^2) as calculated from the following:    Height as of 1/24/17: 5' 9.05\" (1.754 m).    Weight as of this encounter: 194 lb 6.4 oz (88.2 kg).   Last 3 BP:   BP Readings from Last 3 Encounters:   03/13/17 (!) 148/105   01/24/17 (!) 164/101   01/21/16 133/88       History   Smoking Status     Never Smoker   Smokeless Tobacco     Not on file       Labs:  Lab Results   Component Value Date    A1C 6.9 03/13/2017     Lab Results   Component Value Date     03/13/2017     Lab Results   Component Value Date    LDL 98 03/13/2017     HDL Cholesterol   Date Value Ref Range Status   03/13/2017 40 >39 mg/dL Final   ]  GFR Estimate   Date Value Ref Range Status   09/14/2007 65 >60 mL/min/1.7m2 Final     GFR Estimate If Black   Date Value Ref Range Status   09/14/2007 79 >60 mL/min/1.7m2 Final     Lab Results   Component Value Date    CR 1.2 01/24/2017     No results found for: MICROALBUMIN    Socio/Economic Considerations:    Support system: family    Health Beliefs and Attitudes:   Patient Activation Measure Survey Score:  No flowsheet data found.    Stage of Change: ACTION (Actively working towards change)    Diabetes knowledge and skills assessment:   Patient is knowledgeable in diabetes management concepts related to: Monitoring, being active  Patient needs further education on the following diabetes management concepts: Healthy Eating, Problem Solving, Reducing Risks and Healthy Coping  Barriers to Learning Assessment: No Barriers identified  Based on learning assessment above, most appropriate setting for further diabetes education would be: Individual setting.      Education provided today on:  AADE Self-Care Behaviors:  Healthy Eating: carbohydrate counting, consistency in amount, composition, and timing " of food intake, eating out, portion control, plate planning method and label reading  Being Active: relationship to blood glucose  Monitoring: purpose, proper technique, log and interpret results, individual blood glucose targets, frequency of monitoring, use of glucose control solution and proper sharps disposal  Patient was instructed on Accu-Chek Briana Connect meter and was able to provide an accurate return demonstration. Patient's blood glucose reading today was 138 mg/dL 2 hours after lunch.    Opportunities for ongoing education and support in diabetes-self management were discussed.  Pt verbalized understanding of concepts discussed and recommendations provided today.       Education Materials Provided:  [place] Understanding Diabetes Booklet, Safe Disposal Options for Needles & Syringes and BG Log Sheet    PLAN:  See Patient Instructions for co-developed, patient-stated behavior change goals.  AVS printed and provided to patient today.    FOLLOW-UP:  Follow-up appointment scheduled on 04/20/2017.  Chart routed to referring provider.    Ongoing plan for education and support: Diabetes Education group class(es) and Follow-up with primary care provider    Shazia Leon RD, LD   Diabetes Education    Time Spent: 90 minutes  Encounter Type: Individual    Any diabetes medication dose changes were made via the CDE Protocol and Collaborative Practice Agreement with the patient's referring provider. A copy of this encounter was shared with the provider.

## 2017-04-20 ENCOUNTER — ALLIED HEALTH/NURSE VISIT (OUTPATIENT)
Dept: EDUCATION SERVICES | Facility: CLINIC | Age: 72
End: 2017-04-20
Payer: MEDICARE

## 2017-04-20 VITALS — WEIGHT: 191 LBS | BODY MASS INDEX: 28.16 KG/M2

## 2017-04-20 DIAGNOSIS — E11.9 DIABETES MELLITUS, TYPE 2 (H): Primary | ICD-10-CM

## 2017-04-20 PROCEDURE — G0108 DIAB MANAGE TRN  PER INDIV: HCPCS

## 2017-04-20 RX ORDER — LANCETS
EACH MISCELLANEOUS
Qty: 1 BOX | Refills: 3 | Status: SHIPPED | OUTPATIENT
Start: 2017-04-20 | End: 2017-06-22 | Stop reason: ALTCHOICE

## 2017-04-20 NOTE — PATIENT INSTRUCTIONS
- Keep up the great work!    - Get your A1c checked in June and follow up with your primary doctor.     Shazia Leon RD, LD  For Diabetes Education appointments call: [phone]   For Diabetes Education Related Questions call: [phone] or email: [email or web address]

## 2017-04-20 NOTE — MR AVS SNAPSHOT
After Visit Summary   4/20/2017    Agustin Nolan    MRN: [id number]           Patient Information     Date Of Birth          1945        Visit Information        Provider Department      4/20/2017 1:00 PM  DIABETIC ED RESOURCE [facility]        Care Instructions    - Keep up the great work!    - Get your A1c checked in June and follow up with your primary doctor.     Shazia Leon RD, LD  For Diabetes Education appointments call: [phone]   For Diabetes Education Related Questions call: [phone] or email: [email or web address]            Follow-ups after your visit        Your next 10 appointments already scheduled     Jun 22, 2017  1:00 PM CDT   Diabetic Education with  DIABETIC ED RESOURCE   [facility] ([facility])    [address] [address] [phone]              Who to contact     If you have questions or need follow up information about today's clinic visit or your schedule please contact [facility] directly at [phone].  Normal or non-critical lab and imaging results will be communicated to you by MyChart, letter or phone within 4 business days after the clinic has received the results. If you do not hear from us within 7 days, please contact the clinic through MyChart or phone. If you have a critical or abnormal lab result, we will notify you by phone as soon as possible.  Submit refill requests through [employer] or call your pharmacy and they will forward the refill request to us. Please allow 3 business days for your refill to be completed.          Additional Information About Your Visit        MyChart Information     [employer] gives you secure access to your electronic health record. If you see a primary care provider, you can also send messages to your care team and make appointments. If you have questions, please call your primary care clinic.  If you do not have a primary  care provider, please call [phone] and they will assist you.        Care EveryWhere ID     This is your Care EveryWhere ID. This could be used by other organizations to access your [place] medical records  [id number]        Your Vitals Were     BMI (Body Mass Index)                   28.16 kg/m2            Blood Pressure from Last 3 Encounters:   03/13/17 (!) 148/105   01/24/17 (!) 164/101   01/21/16 133/88    Weight from Last 3 Encounters:   04/20/17 191 lb (86.6 kg)   03/30/17 194 lb 6.4 oz (88.2 kg)   03/13/17 196 lb 12 oz (89.2 kg)              Today, you had the following     No orders found for display       Primary Care Provider Office Phone # Fax #    Vicente Tomlin [phone]       [address] [address]        Thank you!     Thank you for choosing [facility]  for your care. Our goal is always to provide you with excellent care. Hearing back from our patients is one way we can continue to improve our services. Please take a few minutes to complete the written survey that you may receive in the mail after your visit with us. Thank you!             Your Updated Medication List - Protect others around you: Learn how to safely use, store and throw away your medicines at www.disposemymeds.org.          This list is accurate as of: 4/20/17  2:00 PM.  Always use your most recent med list.                   Brand Name Dispense Instructions for use    aspirin 81 MG tablet      Take 1 tablet by mouth daily       Bilberry (Vaccinium myrtillus) 150 MG Caps      Take 160 mg by mouth       blood glucose monitoring lancets     1 Box    Use to check blood sugar 2 times daily       blood glucose monitoring test strip    ACCU-CHEK JHON PLUS    100 each    Test blood sugar 2 times daily       GLUCOSAMINE CHONDROITIN COMPLX PO      Take by mouth 2 times daily       mupirocin 2 % nasal ointment    BACTROBAN NASAL    10 g    Apply small amount in  each nostril nightly X 14 days       omeprazole 20 MG CR capsule    priLOSEC    90 capsule    Take 1 capsule (20 mg) by mouth daily       pravastatin 40 MG tablet    PRAVACHOL    90 tablet    Take 1 tablet (40 mg) by mouth daily       vitamin D 2000 UNITS tablet      Take 1 tablet by mouth daily       ZYRTEC ALLERGY PO

## 2017-04-20 NOTE — PROGRESS NOTES
Diabetes Self Management Training: Individual Review Visit    Agustin Nolan presents today for evaluation of glucose control related to Type 2 diabetes.    He is accompanied by self    Patient's diabetes management related comments/concerns: No concerns.     Patient's emotional response to diabetes: expresses readiness to learn    Patient would like this visit to be focused around the following diabetes-related behaviors and goals: Healthy Eating    ASSESSMENT:  Patient Problem List and Family Medical History reviewed for relevant medical history, current medical status, and diabetes risk factors.    Current Diabetes Management per Patient:  Taking diabetes medications? NO    Past Diabetes Education: Newly diagnosed (visit 2)    Patient glucose self monitoring as follows: two times daily.   BG meter: Accu-Chek Briana Connect meter.  Patient is a numbers person and likes to check frequently and likes to compare meters.  He went and bought a Relion meter.  Medicare only covers 1 strip/day and so he will use the Relion cash pay if wanting to check more.     BG results:           BG values are: In goal  Patient's most recent   Lab Results   Component Value Date    A1C 6.9 03/13/2017    is meeting goal of <7.0  (has goal of 6.5 after 3 months)    Nutrition:  Patient eats 3 meals per day    Breakfast - 45gm carb  Lunch - italian, cheese, olive oil/bread sticks   Dinner - tilapia, veggies/starch       Beverages: is tryign to drink more water.  3-5 diet cherry sodas/day, coffee with 2oz milk     Cultural/Gnosticism diet restrictions: No     Biggest Challenge to Healthy Eating: none    Physical Activity:    Walking 4 miles/day, 4-5 times weekly.     Diabetes Complications:  Chronic Complication Prevention: Eyes: exam within in the last year? Yes  Nerve/Circulation: foot exam within the last year Yes  Heart Health: BP to goal No, LDL to goal No, Daily Aspirin Yes  Dental Health: brushing/flossing regularly Yes, dental exam  "within last year Yes  Immunizations (flu/pneumonia) up to date? Yes    Vitals:  Wt 191 lb (86.6 kg)  BMI 28.16 kg/m2  Estimated body mass index is 28.16 kg/(m^2) as calculated from the following:    Height as of 1/24/17: 5' 9.05\" (1.754 m).    Weight as of this encounter: 191 lb (86.6 kg).   Last 3 BP:   BP Readings from Last 3 Encounters:   03/13/17 (!) 148/105   01/24/17 (!) 164/101   01/21/16 133/88       History   Smoking Status     Never Smoker   Smokeless Tobacco     Not on file       Labs:  Lab Results   Component Value Date    A1C 6.9 03/13/2017     Lab Results   Component Value Date     03/13/2017     Lab Results   Component Value Date    LDL 98 03/13/2017     HDL Cholesterol   Date Value Ref Range Status   03/13/2017 40 >39 mg/dL Final   ]  GFR Estimate   Date Value Ref Range Status   09/14/2007 65 >60 mL/min/1.7m2 Final     GFR Estimate If Black   Date Value Ref Range Status   09/14/2007 79 >60 mL/min/1.7m2 Final     Lab Results   Component Value Date    CR 1.2 01/24/2017     No results found for: MICROALBUMIN    Socio/Economic Considerations:    Support system: family    Health Beliefs and Attitudes:   Patient Activation Measure Survey Score:  No flowsheet data found.    Stage of Change: ACTION (Actively working towards change)      Diabetes knowledge and skills assessment:   Patient is knowledgeable in diabetes management concepts related to: Healthy Eating, Being Active, Monitoring, Taking Medication, Problem Solving, Reducing Risks and Healthy Coping  Patient needs further education on the following diabetes management concepts: general review PRN  Barriers to Learning Assessment: No Barriers identified  Based on learning assessment above, most appropriate setting for further diabetes education would be: Individual setting.    INTERVENTION:   Completed AADE 7 today.  Patient made a follow up appointment for this summer to stay on track with carb coutnign, diet and exercise.  Will have follow " up after his next A1c.  He has 3 months to bring A1c down to a 6.5 or MD was going to look at Metformin.      Has added over 160 minutes of walking a week and reduced sugar in diet.  Anticipate an improvement in A1c.     Education provided today on:  AADE Self-Care Behaviors:  Healthy Eating: carbohydrate counting, consistency in amount, composition, and timing of food intake, heart healthy diet and label reading  Being Active: relationship to blood glucose and describe appropriate activity program.  Add in weight lifting/strength  Monitoring: frequency of monitoring  Taking Medication: action of prescribed medication  Problem Solving: when to call health care provider, medical identification and sick day arrangements  Reducing Risks: major complications of diabetes, prevention, early diagnostic measures and treatment of complications, foot care, appropriate dental care, annual eye exam, aspirin therapy, A1C - goals, relating to blood glucose levels, how often to check, lipids levels and goals and blood pressure and goals  Healthy Coping: recognize feelings about diagnosis, benefits of making appropriate lifestyle changes and utilize support systems    Opportunities for ongoing education and support in diabetes-self management were discussed.  Pt verbalized understanding of concepts discussed and recommendations provided today.       Education Materials Provided:  Heart Healthy Guidelines  Goals of Care  Taking Charge book    PLAN:  See Patient Instructions for co-developed, patient-stated behavior change goals.  AVS printed and provided to patient today.  Change test strips to once/day    FOLLOW-UP:  Follow-up appointment scheduled in June.  Follow-up as needed.   Follow-up yearly for diabetes education review.    Ongoing plan for education and support: Online diabetes websites/forums, Smartphone Constance(s), Written resources (magazines, books, etc.) and Follow-up with primary care provider    Shazia Leon RD, LD    Diabetes Education    Time Spent: 65 minutes  Encounter Type: Individual

## 2017-05-12 DIAGNOSIS — K21.9 GASTROESOPHAGEAL REFLUX DISEASE, ESOPHAGITIS PRESENCE NOT SPECIFIED: ICD-10-CM

## 2017-05-12 DIAGNOSIS — E78.5 HYPERLIPIDEMIA LDL GOAL <100: ICD-10-CM

## 2017-05-12 RX ORDER — PRAVASTATIN SODIUM 40 MG
40 TABLET ORAL DAILY
Qty: 90 TABLET | Refills: 2 | Status: SHIPPED | OUTPATIENT
Start: 2017-05-12 | End: 2018-01-30

## 2017-05-12 NOTE — TELEPHONE ENCOUNTER
Transferring to [facility] mail order  To MD to auth  OK to keep at 90 day supply with 2 refills to equal out remaining transfer of script.

## 2017-06-22 ENCOUNTER — ALLIED HEALTH/NURSE VISIT (OUTPATIENT)
Dept: EDUCATION SERVICES | Facility: CLINIC | Age: 72
End: 2017-06-22
Payer: MEDICARE

## 2017-06-22 VITALS — BODY MASS INDEX: 27.42 KG/M2 | WEIGHT: 186 LBS

## 2017-06-22 DIAGNOSIS — E11.9 DIABETES MELLITUS, TYPE 2 (H): Primary | ICD-10-CM

## 2017-06-22 PROCEDURE — G0108 DIAB MANAGE TRN  PER INDIV: HCPCS

## 2017-06-22 NOTE — PATIENT INSTRUCTIONS
Follow up with Dr. Tomlin - get new A1c     Domo/Ascensia  meter prescriptions will be sent to [facility] in [place]  -  Ask the pharmacist if that is the preferred brand.  If a different brand is preferred let us know and we can update the prescriptions again.     Shazia Leon RD, LD  For Diabetes Education appointments call: [phone]   For Diabetes Education Related Questions call: [phone] or email: [email or web address]

## 2017-06-22 NOTE — PROGRESS NOTES
"Diabetes Self Management Training: Follow-up Visit    Agustin Nolan presents today for evaluation of glucose control related to Type 2 diabetes.  He is accompanied by self.  Patient's diabetes management related comments/concerns: feels he is in a rut with eating the same foods.     Patient would like this visit to be focused around the following diabetes-related behaviors and goals:     ASSESSMENT:  Patient Problem List reviewed for relevant medical history and current medical status.    Current Diabetes Management per Patient:  Taking diabetes medications? no    Patient glucose self monitoring as follows: two times daily.   BG meter: Accu-Chek & Relion meter   BG results:            BG values are: In goal  Patient's most recent   Lab Results   Component Value Date    A1C 6.9 03/13/2017    is meeting goal of <7.0    Nutrition:  Patient eats 3 meals per day    Breakfast - 2 small cookies, coffee, banana   Lunch - great grains cereal    Dinner - tomatoes/fresh mozz/basil salad + turkey     Physical Activity:    Was walking 4 miles day.  Now working yard    Vitals:  176# on home scale    Wt Readings from Last 5 Encounters:   06/22/17 186 lb (84.4 kg)   04/20/17 191 lb (86.6 kg)   03/30/17 194 lb 6.4 oz (88.2 kg)   03/13/17 196 lb 12 oz (89.2 kg)   01/24/17 195 lb (88.5 kg)     Wt 186 lb (84.4 kg)  BMI 27.42 kg/m2  Estimated body mass index is 27.42 kg/(m^2) as calculated from the following:    Height as of 1/24/17: 5' 9.05\" (1.754 m).    Weight as of this encounter: 186 lb (84.4 kg).   Last 3 BP:   BP Readings from Last 3 Encounters:   03/13/17 (!) 148/105   01/24/17 (!) 164/101   01/21/16 133/88       History   Smoking Status     Never Smoker   Smokeless Tobacco     Not on file       Labs:  Lab Results   Component Value Date    A1C 6.9 03/13/2017     Lab Results   Component Value Date     03/13/2017     Lab Results   Component Value Date    LDL 98 03/13/2017     HDL Cholesterol   Date Value Ref Range Status "   03/13/2017 40 >39 mg/dL Final   ]  GFR Estimate   Date Value Ref Range Status   09/14/2007 65 >60 mL/min/1.7m2 Final     GFR Estimate If Black   Date Value Ref Range Status   09/14/2007 79 >60 mL/min/1.7m2 Final     Lab Results   Component Value Date    CR 1.2 01/24/2017     No results found for: MICROALBUMIN    Health Beliefs and Attitudes:   Patient Activation Measure Survey Score:  No flowsheet data found.    Stage of Change: MAINTENANCE (Working to maintain change, with risk of relapse)    Diabetes knowledge and skills assessment:   Patient is knowledgeable in diabetes management concepts related to: Healthy Eating, Being Active, Monitoring, Taking Medication, Problem Solving, Reducing Risks and Healthy Coping  Patient needs further education on the following diabetes management concepts: general review PRN  Barriers to Learning Assessment: No Barriers identified  Based on learning assessment above, most appropriate setting for further diabetes education would be: Individual setting.    INTERVENTION:  Accu chek does not appear to be covered.  Gave a nash meter and will update prescriptions for this.  Otherwise he uses Relion.   He knows he does not need to check with 2 meters, but he enjoys keeping statistics (see above).  He has noticed the Relion meter runs a bit higher and discussed the margin of error on meters.     Education provided today on:  AADE Self-Care Behaviors:  Healthy Eating: carbohydrate counting, consistency in amount, composition, and timing of food intake, weight reduction, portion control, plate planning method, label reading and reviewed different websites and resources for recipes.      Opportunities for ongoing education and support in diabetes-self management were discussed.  Pt verbalized understanding of concepts discussed and recommendations provided today.       Education Materials Provided:  Carbohydrate Counting    PLAN:  See Patient Instructions for co-developed, patient-stated  behavior change goals.  AVS printed and provided to patient today.    FOLLOW-UP:  Follow-up as needed.   Follow-up yearly for diabetes education review.    Ongoing plan for education and support: Online diabetes websites/forums and Follow-up with primary care provider    Shazia Leon RD, JOSEFINA   Diabetes Education    Time Spent: 60 minutes  Encounter Type: Individual    Any diabetes medication dose changes were made via the CDE Protocol and Collaborative Practice Agreement with the patient's primary care provider. A copy of this encounter was shared with the provider.

## 2017-06-22 NOTE — MR AVS SNAPSHOT
After Visit Summary   6/22/2017    Agustin Nolan    MRN: [id number]           Patient Information     Date Of Birth          1945        Visit Information        Provider Department      6/22/2017 1:00 PM  DIABETIC ED RESOURCE [facility]        Care Instructions    Follow up with Dr. Tomlin - get new A1c     Domo/Ascensia  meter prescriptions will be sent to [facility] in [place]  -  Ask the pharmacist if that is the preferred brand.  If a different brand is preferred let us know and we can update the prescriptions again.     Shazia Leon RD, LD  For Diabetes Education appointments call: [phone]   For Diabetes Education Related Questions call: [phone] or email: [email or web address]            Follow-ups after your visit        Who to contact     If you have questions or need follow up information about today's clinic visit or your schedule please contact [facility] directly at [phone].  Normal or non-critical lab and imaging results will be communicated to you by MyChart, letter or phone within 4 business days after the clinic has received the results. If you do not hear from us within 7 days, please contact the clinic through Zenboxhart or phone. If you have a critical or abnormal lab result, we will notify you by phone as soon as possible.  Submit refill requests through Cross Current or call your pharmacy and they will forward the refill request to us. Please allow 3 business days for your refill to be completed.          Additional Information About Your Visit        Zenboxhart Information     Cross Current gives you secure access to your electronic health record. If you see a primary care provider, you can also send messages to your care team and make appointments. If you have questions, please call your primary care clinic.  If you do not have a primary care provider, please call [phone] and they will assist you.        Care EveryWhere ID     This  is your Care EveryWhere ID. This could be used by other organizations to access your [place] medical records  [id number]         Blood Pressure from Last 3 Encounters:   03/13/17 (!) 148/105   01/24/17 (!) 164/101   01/21/16 133/88    Weight from Last 3 Encounters:   04/20/17 191 lb (86.6 kg)   03/30/17 194 lb 6.4 oz (88.2 kg)   03/13/17 196 lb 12 oz (89.2 kg)              Today, you had the following     No orders found for display       Primary Care Provider Office Phone # Fax #    Vicente Tomlin [phone]       [place] [address]        Equal Access to Services     BRENNAN MCKENZIE : Miles fuo Sokathryn, waaxda luqadaha, qaybta kaalmada adeegyada, nik carrillo . So [facility] [phone].    ATENCIÓN: Si habla español, tiene a bernstein disposición servicios gratuitos de asistencia lingüística. Llame al [phone].    We comply with applicable federal civil rights laws and [place] laws. We do not discriminate on the basis of race, color, national origin, age, disability sex, sexual orientation or gender identity.            Thank you!     Thank you for choosing [facility]  for your care. Our goal is always to provide you with excellent care. Hearing back from our patients is one way we can continue to improve our services. Please take a few minutes to complete the written survey that you may receive in the mail after your visit with us. Thank you!             Your Updated Medication List - Protect others around you: Learn how to safely use, store and throw away your medicines at www.disposemymeds.org.          This list is accurate as of: 6/22/17  1:51 PM.  Always use your most recent med list.                   Brand Name Dispense Instructions for use Diagnosis    aspirin 81 MG tablet      Take 1 tablet by mouth daily        Bilberry (Vaccinium myrtillus) 150 MG Caps      Take 160 mg by mouth        blood  glucose monitoring lancets     1 Box    Use to check blood sugar1 times daily    Diabetes mellitus, type 2 (H)       blood glucose monitoring test strip    ACCU-CHEK JHON PLUS    100 each    Test blood sugar 1 times daily    Diabetes mellitus, type 2 (H)       GLUCOSAMINE CHONDROITIN COMPLX PO      Take by mouth 2 times daily        mupirocin 2 % nasal ointment    BACTROBAN NASAL    10 g    Apply small amount in each nostril nightly X 14 days    Staphylococcus infection of nose       omeprazole 20 MG CR capsule    priLOSEC    90 capsule    Take 1 capsule (20 mg) by mouth daily    Gastroesophageal reflux disease, esophagitis presence not specified       pravastatin 40 MG tablet    PRAVACHOL    90 tablet    Take 1 tablet (40 mg) by mouth daily    Hyperlipidemia LDL goal <100       vitamin D 2000 UNITS tablet      Take 1 tablet by mouth daily        ZYRTEC ALLERGY PO

## 2017-07-14 ENCOUNTER — OFFICE VISIT (OUTPATIENT)
Dept: FAMILY MEDICINE | Facility: CLINIC | Age: 72
End: 2017-07-14

## 2017-07-14 VITALS
DIASTOLIC BLOOD PRESSURE: 83 MMHG | TEMPERATURE: 97.5 F | OXYGEN SATURATION: 97 % | SYSTOLIC BLOOD PRESSURE: 127 MMHG | WEIGHT: 186 LBS | BODY MASS INDEX: 27.55 KG/M2 | HEART RATE: 56 BPM | HEIGHT: 69 IN

## 2017-07-14 DIAGNOSIS — R73.03 PREDIABETES: ICD-10-CM

## 2017-07-14 DIAGNOSIS — Z23 NEED FOR PNEUMOCOCCAL VACCINATION: ICD-10-CM

## 2017-07-14 DIAGNOSIS — E11.9 CONTROLLED TYPE 2 DIABETES MELLITUS WITHOUT COMPLICATION, WITHOUT LONG-TERM CURRENT USE OF INSULIN (H): Primary | ICD-10-CM

## 2017-07-14 LAB
CREAT UR-MCNC: 105 MG/DL
HBA1C MFR BLD: 6.4 % (ref 4.1–5.7)
MICROALBUMIN UR-MCNC: 13 MG/L
MICROALBUMIN/CREAT UR: 12.38 MG/G CR (ref 0–17)
TSH SERPL DL<=0.005 MIU/L-ACNC: 3.72 MU/L (ref 0.4–4)

## 2017-07-14 ASSESSMENT — PAIN SCALES - GENERAL: PAINLEVEL: NO PAIN (0)

## 2017-07-14 NOTE — NURSING NOTE
"71 year old  Chief Complaint   Patient presents with     Diabetes       Blood pressure 127/83, pulse 56, temperature 97.5  F (36.4  C), height 5' 9.05\" (175.4 cm), weight 186 lb (84.4 kg), SpO2 97 %. Body mass index is 27.42 kg/(m^2).  Patient Active Problem List   Diagnosis     Preventative health care     Hyperlipidemia LDL goal <130       Wt Readings from Last 2 Encounters:   07/14/17 186 lb (84.4 kg)   06/22/17 186 lb (84.4 kg)     BP Readings from Last 3 Encounters:   07/14/17 127/83   03/13/17 (!) 148/105   01/24/17 (!) 164/101         Current Outpatient Prescriptions   Medication     blood glucose monitoring (MADAI CONTOUR NEXT) test strip     blood glucose monitoring (MADAI MICROLET) lancets     pravastatin (PRAVACHOL) 40 MG tablet     omeprazole (PRILOSEC) 20 MG CR capsule     mupirocin (BACTROBAN NASAL) 2 % nasal ointment     Bilberry, Vaccinium myrtillus, 150 MG CAPS     Cetirizine HCl (ZYRTEC ALLERGY PO)     GLUCOSAMINE CHONDROITIN COMPLX PO     Cholecalciferol (VITAMIN D) 2000 UNITS tablet     aspirin 81 MG tablet     No current facility-administered medications for this visit.        Social History   Substance Use Topics     Smoking status: Never Smoker     Smokeless tobacco: Not on file     Alcohol use Yes       Health Maintenance Due   Topic Date Due     FOOT EXAM Q1 YEAR  08/01/1946     EYE EXAM Q1 YEAR  08/01/1946     MICROALBUMIN Q1 YEAR  [birth year]/01/1946     ADVANCE DIRECTIVE PLANNING Q5 YRS  08/01/1965     TSH W/ FREE T4 REFLEX Q2 YEAR  03/08/2014     PNEUMOCOCCAL (2 of 2 - PCV13) 08/23/2014       No results found for: NABIL Benavides CMA  July 14, 2017 9:42 AM    Screening Questionnaire for Adult Immunization    Are you sick today?   No   Do you have allergies to medications, food, a vaccine component or latex?   No   Have you ever had a serious reaction after receiving a vaccination?   No   Do you have a long-term health problem with heart disease, lung disease, asthma, kidney disease, metabolic " disease (e.g. diabetes), anemia, or other blood disorder?   No   Do you have cancer, leukemia, HIV/AIDS, or any other immune system problem?   No   In the past 3 months, have you taken medications that affect  your immune system, such as prednisone, other steroids, or anticancer drugs; drugs for the treatment of rheumatoid arthritis, Crohn s disease, or psoriasis; or have you had radiation treatments?   No   Have you had a seizure, or a brain or other nervous system problem?   No   During the past year, have you received a transfusion of blood or blood     products, or been given immune (gamma) globulin or antiviral drug?   No   For women: Are you pregnant or is there a chance you could become        pregnant during the next month?   No   Have you received any vaccinations in the past 4 weeks?   No     Immunization questionnaire answers were all negative.      Given by Rima Benavides. Patient instructed to remain in clinic for 15 minutes afterwards, and to report any adverse reaction to me immediately.       Screening performed by Rima Benavides on 7/14/2017 at 9:42 AM.

## 2017-07-14 NOTE — NURSING NOTE
"71 year old  Chief Complaint   Patient presents with     Diabetes       Blood pressure 127/83, pulse 56, temperature 97.5  F (36.4  C), height 5' 9.05\" (175.4 cm), weight 186 lb (84.4 kg), SpO2 97 %. Body mass index is 27.42 kg/(m^2).  Patient Active Problem List   Diagnosis     Preventative health care     Hyperlipidemia LDL goal <130       Wt Readings from Last 2 Encounters:   07/14/17 186 lb (84.4 kg)   06/22/17 186 lb (84.4 kg)     BP Readings from Last 3 Encounters:   07/14/17 127/83   03/13/17 (!) 148/105   01/24/17 (!) 164/101         Current Outpatient Prescriptions   Medication     blood glucose monitoring (MADAI CONTOUR NEXT) test strip     blood glucose monitoring (MADAI MICROLET) lancets     pravastatin (PRAVACHOL) 40 MG tablet     omeprazole (PRILOSEC) 20 MG CR capsule     mupirocin (BACTROBAN NASAL) 2 % nasal ointment     Bilberry, Vaccinium myrtillus, 150 MG CAPS     Cetirizine HCl (ZYRTEC ALLERGY PO)     GLUCOSAMINE CHONDROITIN COMPLX PO     Cholecalciferol (VITAMIN D) 2000 UNITS tablet     aspirin 81 MG tablet     No current facility-administered medications for this visit.        Social History   Substance Use Topics     Smoking status: Never Smoker     Smokeless tobacco: Not on file     Alcohol use Yes       Health Maintenance Due   Topic Date Due     FOOT EXAM Q1 YEAR  08/01/1946     EYE EXAM Q1 YEAR  [birth year]01/1946     MICROALBUMIN Q1 YEAR  [birth year]01/1948     ADVANCE DIRECTIVE PLANNING Q5 YRS  08/01/1965     TSH W/ FREE T4 REFLEX Q2 YEAR  03/08/2014     PNEUMOCOCCAL (2 of 2 - PCV13) 08/23/2014       No results found for: NABIL Benavides CMA  July 14, 2017 9:02 AM  "

## 2017-07-14 NOTE — MR AVS SNAPSHOT
After Visit Summary   7/14/2017    Agustin Nolan    MRN: [id number]           Patient Information     Date Of Birth          1945        Visit Information        Provider Department      7/14/2017 9:20 AM Vicente Tomlin MD [facility]        Today's Diagnoses     Controlled type 2 diabetes mellitus without complication, without long-term current use of insulin (H)    -  1    Need for pneumococcal vaccination           Follow-ups after your visit        Who to contact     Please call your clinic at [phone] to:    Ask questions about your health    Make or cancel appointments    Discuss your medicines    Learn about your test results    Speak to your doctor   If you have compliments or concerns about an experience at your clinic, or if you wish to file a complaint, please contact [facility] Patient Relations at [phone] or email us at [email or web address]         Additional Information About Your Visit        MyChart Information     E[employer] gives you secure access to your electronic health record. If you see a primary care provider, you can also send messages to your care team and make appointments. If you have questions, please call your primary care clinic.  If you do not have a primary care provider, please call [phone] and they will assist you.      Pro[employer]ica is an electronic gateway that provides easy, online access to your medical records. With [employer], you can request a clinic appointment, read your test results, renew a prescription or communicate with your care team.     To access your existing account, please contact your [facility] or call [phone] for assistance.        Care EveryWhere ID     This is your Care EveryWhere ID. This could be used by other organizations to access your [place] medical records  [id number]        Your Vitals Were     Pulse Temperature Height Pulse Oximetry BMI  "(Body Mass Index)       56 97.5  F (36.4  C) 5' 9.05\" (175.4 cm) 97% 27.42 kg/m2        Blood Pressure from Last 3 Encounters:   07/14/17 127/83   03/13/17 (!) 148/105   01/24/17 (!) 164/101    Weight from Last 3 Encounters:   07/14/17 186 lb (84.4 kg)   06/22/17 186 lb (84.4 kg)   04/20/17 191 lb (86.6 kg)              We Performed the Following     ADMIN: Vaccine, Initial (13348)     Albumin Random Urine Quantitative     Hemoglobin A1c ([place])     Pneumococcal vaccine 13 valent PCV13 IM (Prevnar) [42497]     TSH with free T4 reflex        Primary Care Provider Office Phone # Fax #    Vicente Tomlin [phone]       [address] [address]        Equal Access to Services     BRENNAN MCKENZIE : Hadii jemal fuo Sokathryn, waaxda luqadaha, qaybta kaalmada adebrigidyada, nik carrillo . So [facility] [phone].    ATENCIÓN: Si habla español, tiene a bernstein disposición servicios gratuitos de asistencia lingüística. [place] al [phone].    We comply with applicable federal civil rights laws and [place] laws. We do not discriminate on the basis of race, color, national origin, age, disability sex, sexual orientation or gender identity.            Thank you!     Thank you for choosing [facility]  for your care. Our goal is always to provide you with excellent care. Hearing back from our patients is one way we can continue to improve our services. Please take a few minutes to complete the written survey that you may receive in the mail after your visit with us. Thank you!             Your Updated Medication List - Protect others around you: Learn how to safely use, store and throw away your medicines at www.disposemymeds.org.          This list is accurate as of: 7/14/17 11:21 AM.  Always use your most recent med list.                   Brand Name Dispense Instructions for use Diagnosis    aspirin 81 MG tablet      Take 1 tablet by mouth " daily        Bilberry (Vaccinium myrtillus) 150 MG Caps      Take 160 mg by mouth        blood glucose monitoring lancets     100 each    Use to test blood sugar 1 times daily    Diabetes mellitus, type 2 (H)       blood glucose monitoring test strip    MADAI CONTOUR NEXT    100 strip    Use to test blood sugar 1 times daily    Diabetes mellitus, type 2 (H)       GLUCOSAMINE CHONDROITIN COMPLX PO      Take by mouth 2 times daily        mupirocin 2 % nasal ointment    BACTROBAN NASAL    10 g    Apply small amount in each nostril nightly X 14 days    Staphylococcus infection of nose       omeprazole 20 MG CR capsule    priLOSEC    90 capsule    Take 1 capsule (20 mg) by mouth daily    Gastroesophageal reflux disease, esophagitis presence not specified       pravastatin 40 MG tablet    PRAVACHOL    90 tablet    Take 1 tablet (40 mg) by mouth daily    Hyperlipidemia LDL goal <100       vitamin D 2000 UNITS tablet      Take 1 tablet by mouth daily        ZYRTEC ALLERGY PO

## 2017-08-06 PROBLEM — R73.03 PREDIABETES: Status: ACTIVE | Noted: 2017-08-06

## 2017-08-06 PROBLEM — E11.9 CONTROLLED TYPE 2 DIABETES MELLITUS WITHOUT COMPLICATION, WITHOUT LONG-TERM CURRENT USE OF INSULIN (H): Status: RESOLVED | Noted: 2017-08-06 | Resolved: 2017-08-06

## 2017-08-06 PROBLEM — E11.9 CONTROLLED TYPE 2 DIABETES MELLITUS WITHOUT COMPLICATION, WITHOUT LONG-TERM CURRENT USE OF INSULIN (H): Status: ACTIVE | Noted: 2017-08-06

## 2017-08-06 NOTE — PROGRESS NOTES
CHIEF COMPLAINT:  Followup regarding new diagnosis of controlled type 2 diabetes.      HISTORY OF PRESENT ILLNESS:  Agustin is a 72 year old whom I last saw in March.  At the time his glucose was up, and his A1c had gone up to 6.9%.  Technically, he was a new type 2 diabetic at that time.  He really thought that he could control it by a better diet and exercise, and he has really engaged upon that.  We will go ahead and check another A1c today and see where he is at.        From a diabetic standpoint, he understands that we want to keep the A1c less than 8% and ideally less than 6.5%.  This is especially true in his case.  Blood pressure needs to be less than 140/90, and his is 127/83.  We would like to see his LDL less than 100, and his most recent level was 98.  He is not a smoker.  He is on aspirin 81 mg daily.      He had an eye exam in September that was normal.  We will check a TSH today just to make sure that that is normal.  In addition, given his age (and the fact that he has diabetes), we are going to go ahead and give PCV 13 today.  We will check a urine microalbumin.      His feet are tingling.  He has some discomfort, and walking can hurt his feet.  We examined his feet before.  He has no thick calluses.  No obvious bunions.  He is looking for a good pair of shoes, and I am going to recommend that he go to [facility], where they have a  who can work with him.        OBJECTIVE:  Agustin is in absolutely no distress.  BP is 128/83 with a pulse of 56.  Temperature is 97.5.  He is 5 feet 9 and weighs 186, exactly what he weighed on 06/22.  BMI is 27.42.  I am happy to report that his A1c dropped down from 6.9%-6.4%.  Technically, he is back in the prediabetes range rather than diabetes.  He has done this without medication, so I feel good about making the change in his official diagnosis back to prediabetes.  No other exam was done today.      ASSESSMENT/PLAN:  Recent diagnosis of controlled type 2  diabetes.  However, through diet and exercise, Agustin was able to get that down half a percentage point from 6.9%-6.4%.  Therefore, I am going to change his diagnosis back to prediabetes.  In any case, I would like to see him back in 6 months, and I would encourage him to continue with his healthy lifestyle.  If it continues to bounce back and forth, then I think at some point we should just go ahead with metformin 500 mg, extended release, once daily with breakfast and then follow him from there.  He will call with any problems or questions.

## 2017-08-30 DIAGNOSIS — Z86.19 H/O TRAVELER'S DIARRHEA: Primary | ICD-10-CM

## 2017-08-30 RX ORDER — CIPROFLOXACIN 500 MG/1
500 TABLET, FILM COATED ORAL 2 TIMES DAILY
Qty: 10 TABLET | Refills: 0 | Status: SHIPPED | OUTPATIENT
Start: 2017-08-30 | End: 2018-10-30

## 2017-10-17 ENCOUNTER — TRANSFERRED RECORDS (OUTPATIENT)
Dept: HEALTH INFORMATION MANAGEMENT | Facility: CLINIC | Age: 72
End: 2017-10-17

## 2018-01-30 DIAGNOSIS — E78.5 HYPERLIPIDEMIA LDL GOAL <100: ICD-10-CM

## 2018-01-30 DIAGNOSIS — K21.9 GASTROESOPHAGEAL REFLUX DISEASE, ESOPHAGITIS PRESENCE NOT SPECIFIED: ICD-10-CM

## 2018-01-30 RX ORDER — PRAVASTATIN SODIUM 40 MG
40 TABLET ORAL DAILY
Qty: 90 TABLET | Refills: 1 | Status: SHIPPED | OUTPATIENT
Start: 2018-01-30 | End: 2018-07-26

## 2018-01-30 NOTE — TELEPHONE ENCOUNTER
Last office visit: 07/14/2017, future appointment 03/15/2018    Prescription approved per [facility] Refill Protocol.

## 2018-03-15 ENCOUNTER — OFFICE VISIT (OUTPATIENT)
Dept: FAMILY MEDICINE | Facility: CLINIC | Age: 73
End: 2018-03-15
Payer: MEDICARE

## 2018-03-15 DIAGNOSIS — Z12.5 SCREENING FOR PROSTATE CANCER: ICD-10-CM

## 2018-03-15 DIAGNOSIS — G60.9 IDIOPATHIC PERIPHERAL NEUROPATHY: ICD-10-CM

## 2018-03-15 DIAGNOSIS — E78.5 HYPERLIPIDEMIA LDL GOAL <130: ICD-10-CM

## 2018-03-15 DIAGNOSIS — Z97.4 WEARS HEARING AID: ICD-10-CM

## 2018-03-15 DIAGNOSIS — R73.03 PREDIABETES: ICD-10-CM

## 2018-03-15 DIAGNOSIS — Z00.00 MEDICARE ANNUAL WELLNESS VISIT, SUBSEQUENT: Primary | ICD-10-CM

## 2018-03-15 LAB
ALT SERPL-CCNC: 14 U/L (ref 0–70)
ANION GAP SERPL CALCULATED.3IONS-SCNC: 9 MMOL/L (ref 3–14)
AST SERPL-CCNC: 21 U/L (ref 0–55)
BUN SERPL-MCNC: 18 MG/DL (ref 7–30)
CALCIUM SERPL-MCNC: 8.8 MG/DL (ref 8.5–10.1)
CHLORIDE SERPL-SCNC: 106 MMOL/L (ref 94–109)
CHOLEST SERPL-MCNC: 177 MG/DL (ref 0–200)
CHOLEST/HDLC SERPL: 3.7 {RATIO} (ref 0–5)
CO2 SERPL-SCNC: 24 MMOL/L (ref 20–32)
CREAT SERPL-MCNC: 0.98 MG/DL (ref 0.66–1.25)
FASTING SPECIMEN: NO
GFR SERPL CREATININE-BSD FRML MDRD: 76 ML/MIN/1.7M2
GLUCOSE SERPL-MCNC: 124 MG/DL (ref 70–99)
GLUCOSE SERPL-MCNC: 134 MG/DL (ref 60–109)
HBA1C MFR BLD: 6.4 % (ref 4.1–5.7)
HDLC SERPL-MCNC: 48 MG/DL
LDLC SERPL CALC-MCNC: 100 MG/DL (ref 0–129)
POTASSIUM SERPL-SCNC: 4.3 MMOL/L (ref 3.4–5.3)
PSA SERPL-ACNC: 5.1 UG/L (ref 0–4)
SODIUM SERPL-SCNC: 140 MMOL/L (ref 133–144)
TRIGL SERPL-MCNC: 146 MG/DL (ref 0–150)
VLDL-CHOLESTEROL: 29 (ref 7–32)

## 2018-03-15 NOTE — NURSING NOTE
"72 year old  Chief Complaint   Patient presents with     Physical       Blood pressure 145/76, pulse 62, temperature 97.7  F (36.5  C), temperature source Oral, height 5' 8.94\" (175.1 cm), weight 190 lb (86.2 kg), SpO2 95 %. Body mass index is 28.11 kg/(m^2).  Patient Active Problem List   Diagnosis     Preventative health care     Hyperlipidemia LDL goal <130     Prediabetes       Wt Readings from Last 2 Encounters:   03/15/18 190 lb (86.2 kg)   07/14/17 186 lb (84.4 kg)     BP Readings from Last 3 Encounters:   03/15/18 145/76   07/14/17 127/83   03/13/17 (!) 148/105         Current Outpatient Prescriptions   Medication     pravastatin (PRAVACHOL) 40 MG tablet     omeprazole (PRILOSEC) 20 MG CR capsule     blood glucose monitoring (MADAI CONTOUR NEXT) test strip     blood glucose monitoring (MADAI MICROLET) lancets     mupirocin (BACTROBAN NASAL) 2 % nasal ointment     Bilberry, Vaccinium myrtillus, 150 MG CAPS     Cetirizine HCl (ZYRTEC ALLERGY PO)     GLUCOSAMINE CHONDROITIN COMPLX PO     Cholecalciferol (VITAMIN D) 2000 UNITS tablet     aspirin 81 MG tablet     ciprofloxacin (CIPRO) 500 MG tablet     No current facility-administered medications for this visit.        Social History   Substance Use Topics     Smoking status: Never Smoker     Smokeless tobacco: Never Used     Alcohol use Yes       Health Maintenance Due   Topic Date Due     FOOT EXAM Q1 YEAR  08/01/1946     ADVANCE DIRECTIVE PLANNING Q5 YRS  08/01/2002     A1C Q6 MO  01/14/2018     CREATININE Q1 YEAR  01/24/2018     LIPID MONITORING Q1 YEAR  03/13/2018       No results found for: PAP      March 15, 2018 7:55 AM  "

## 2018-03-15 NOTE — MR AVS SNAPSHOT
"              After Visit Summary   3/15/2018    Agustin Nolan    MRN: [id number]           Patient Information     Date Of Birth          1945        Visit Information        Provider Department      3/15/2018 8:00 AM Vicente Tomlin MD [facility]        Today's Diagnoses     Preventative health care    -  1    Needs flu shot        Hyperlipidemia LDL goal <130        Prediabetes        Screening for prostate cancer           Follow-ups after your visit        Who to contact     Please call your clinic at [phone] to:    Ask questions about your health    Make or cancel appointments    Discuss your medicines    Learn about your test results    Speak to your doctor            Additional Information About Your Visit        MyChart Information     [employer] gives you secure access to your electronic health record. If you see a primary care provider, you can also send messages to your care team and make appointments. If you have questions, please call your primary care clinic.  If you do not have a primary care provider, please call [phone] and they will assist you.      [employer] is an electronic gateway that provides easy, online access to your medical records. With [employer], you can request a clinic appointment, read your test results, renew a prescription or communicate with your care team.     To access your existing account, please contact your [facility] or call [phone] for assistance.        Care EveryWhere ID     This is your Care EveryWhere ID. This could be used by other organizations to access your [facility] medical records  [id number]        Your Vitals Were     Pulse Temperature Height Pulse Oximetry BMI (Body Mass Index)       62 97.7  F (36.5  C) (Oral) 5' 8.94\" (175.1 cm) 95% 28.11 kg/m2        Blood Pressure from Last 3 Encounters:   03/15/18 145/76   07/14/17 127/83   03/13/17 (!) 148/105    Weight from Last 3 Encounters:   03/15/18 190 lb (86.2 " kg)   07/14/17 186 lb (84.4 kg)   06/22/17 186 lb (84.4 kg)              We Performed the Following     Basic Metabolic Panel ([place])     Hemoglobin A1c ([place])     Lipid Panel Plus ([place])     Prostate spec antigen screen        Primary Care Provider Office Phone # Fax #    Vicente Tomlin [phone]       [address] [place] [address]        Equal Access to Services     BRENNAN MCKENZIE : Hadii aad ku hadasho Soomaali, waaxda luqadaha, qaybta kaalmada adeegyada, waxay idiin hayaan adeeg kharash laluzma . So [facility] [phone].    ATENCIÓN: Si habla español, tiene a bernstein disposición servicios gratuitos de asistencia lingüística. Llame al [phone].    We comply with applicable federal civil rights laws and [place] laws. We do not discriminate on the basis of race, color, national origin, age, disability, sex, sexual orientation, or gender identity.            Thank you!     Thank you for choosing [facility]  for your care. Our goal is always to provide you with excellent care. Hearing back from our patients is one way we can continue to improve our services. Please take a few minutes to complete the written survey that you may receive in the mail after your visit with us. Thank you!             Your Updated Medication List - Protect others around you: Learn how to safely use, store and throw away your medicines at www.disposemymeds.org.          This list is accurate as of 3/15/18  8:31 AM.  Always use your most recent med list.                   Brand Name Dispense Instructions for use Diagnosis    aspirin 81 MG tablet      Take 1 tablet by mouth daily        Bilberry (Vaccinium myrtillus) 150 MG Caps      Take 160 mg by mouth        blood glucose monitoring lancets     100 each    Use to test blood sugar 1 times daily    Diabetes mellitus, type 2 (H)       blood glucose monitoring test strip    MADAI CONTOUR NEXT    100 strip    Use to test blood sugar 1 times  daily    Diabetes mellitus, type 2 (H)       ciprofloxacin 500 MG tablet    CIPRO    10 tablet    Take 1 tablet (500 mg) by mouth 2 times daily    H/O traveler's diarrhea       GLUCOSAMINE CHONDROITIN COMPLX PO      Take by mouth 2 times daily        mupirocin 2 % nasal ointment    BACTROBAN NASAL    10 g    Apply small amount in each nostril nightly X 14 days    Staphylococcus infection of nose       omeprazole 20 MG CR capsule    priLOSEC    90 capsule    Take 1 capsule (20 mg) by mouth daily    Gastroesophageal reflux disease, esophagitis presence not specified       pravastatin 40 MG tablet    PRAVACHOL    90 tablet    Take 1 tablet (40 mg) by mouth daily    Hyperlipidemia LDL goal <100       vitamin D 2000 UNITS tablet      Take 1 tablet by mouth daily        ZYRTEC ALLERGY PO

## 2018-03-20 DIAGNOSIS — R97.20 RISING PSA LEVEL: Primary | ICD-10-CM

## 2018-03-25 VITALS
WEIGHT: 190 LBS | BODY MASS INDEX: 28.14 KG/M2 | HEIGHT: 69 IN | DIASTOLIC BLOOD PRESSURE: 80 MMHG | OXYGEN SATURATION: 95 % | SYSTOLIC BLOOD PRESSURE: 114 MMHG | HEART RATE: 62 BPM | TEMPERATURE: 97.7 F

## 2018-03-25 PROBLEM — Z97.4 WEARS HEARING AID: Status: ACTIVE | Noted: 2018-03-25

## 2018-03-25 PROBLEM — Z00.00 MEDICARE ANNUAL WELLNESS VISIT, SUBSEQUENT: Status: ACTIVE | Noted: 2018-03-25

## 2018-03-25 PROBLEM — G60.9 IDIOPATHIC PERIPHERAL NEUROPATHY: Status: ACTIVE | Noted: 2018-03-25

## 2018-03-25 NOTE — PROGRESS NOTES
CHIEF COMPLAINT:  Medicare annual wellness visit, subsequent.      HISTORY OF PRESENT ILLNESS:  Agustin is a 72 year old here for the above.  Overall, he is doing very well.  He has very few concerns.  He has prediabetes and hyperlipidemia.  He has been checking his blood pressure and blood sugar readings at home, and he showed me dozens and dozens of readings that he has been keeping track of on his iPad.  He has a program that he has been using very frequently.  He has a little neuropathy in his feet, and this is long-standing.      ACTIVE MEDICAL PROBLEMS AND CURRENT MEDICATIONS:  All reviewed.      SOCIAL, FAMILY AND PAST SURGICAL HISTORIES:  Unchanged.      MEDICARE QUESTIONS:  Absolutely no problems at all with any activities of daily living.  No falls at home in the last year.  No adaptive devices used.  No symptoms of depression in the last month.  No concerns about memory loss.      HEALTHCARE MAINTENANCE:  He had cataract surgery, and he is extremely happy with the results.  He is seeing colors now that he had not seen in quite some time.  He is followed closely by Ophthalmology.  He has bilateral hearing aids.  He is having a lot of dental work done.  Blood pressure at home averages 114/80.  Blood sugars average about 119, and he is checking twice a day.  Body mass index is 28.11.  Weight is up 4 pounds from last July.  From an immunization standpoint, he had a flu shot at [facility].  He will consider getting a shingles vaccine series.  He is otherwise up-to-date.  He needs a PSA 50 today.  Lipid panel plus, A1c and basic metabolic panel pending.  Colon cancer screening is good through 2020.      REVIEW OF SYSTEMS:  A 10 point review of systems is negative.      OBJECTIVE:     GENERAL:  Agustin is in no distress.  Affect is upbeat.  He is completely alert and oriented x3.     VITAL SIGNS:  BP here is 145/76, but it is averaging about 114/80 at home.  Pulse is 62 and regular.  Temperature is 97.7.  He is 5 feet 9  and weighs 190 with a BMI of 28.11.  O2 sats are 95% on room air.   HEENT:  Head is normocephalic, atraumatic.  Ears are free of any cerumen.  The TMs look fine.  There is no pain with palpation of the frontal or maxillary sinuses.  Eyes are grossly normal.  Nose is free of any congestion or discharge.  Teeth are in good repair.  Mucous membranes are moist.  Throat looks benign.   NECK:  Supple without adenopathy or thyromegaly.  No carotid bruits are heard, no JVD.   BACK:  Smooth and straight.  No pain to percussion.  No CVA tenderness.   LUNGS:  Clear to auscultation bilaterally.   HEART:  Regular rate and rhythm without murmur.   ABDOMEN:  Benign.   EXTREMITIES:  Ankles are free of any edema.  Skin is warm and dry.  Good peripheral pulses.  Monofilament testing reveals some areas of decreased sensation.  The skin itself looks fine.  The importance of looking at his feet every night was stressed        LABORATORY DATA:  PSA 50, lipid panel plus, A1c, basic metabolic panel and urine microalbumin all pending.  Foot exam carried out as described.      ASSESSMENT/PLAN:   1.  Medicare annual wellness visit, subsequent.   2.  Screening for prostate cancer with a PSA 50.   3.  History of prediabetes with some peripheral neuropathy.  His A1c is always less than 6.5%.  We will check again today, and we will check other associated labs.    4.  History of hyperlipidemia.  Lipid panel pending.   5.  Flu shot up-to-date.   6.  Call with any problems or questions.  I look forward to seeing him back in a year for a similar exam.

## 2018-04-25 DIAGNOSIS — R97.20 ELEVATED PROSTATE SPECIFIC ANTIGEN (PSA): Primary | ICD-10-CM

## 2018-04-26 ENCOUNTER — OFFICE VISIT (OUTPATIENT)
Dept: UROLOGY | Facility: CLINIC | Age: 73
End: 2018-04-26
Payer: MEDICARE

## 2018-04-26 VITALS
HEART RATE: 73 BPM | WEIGHT: 182 LBS | BODY MASS INDEX: 26.05 KG/M2 | DIASTOLIC BLOOD PRESSURE: 82 MMHG | OXYGEN SATURATION: 93 % | HEIGHT: 70 IN | SYSTOLIC BLOOD PRESSURE: 122 MMHG

## 2018-04-26 DIAGNOSIS — R97.20 ELEVATED PROSTATE SPECIFIC ANTIGEN (PSA): ICD-10-CM

## 2018-04-26 LAB
ALBUMIN UR-MCNC: NEGATIVE MG/DL
APPEARANCE UR: CLEAR
BILIRUB UR QL STRIP: NEGATIVE
COLOR UR AUTO: YELLOW
GLUCOSE UR STRIP-MCNC: NEGATIVE MG/DL
HGB UR QL STRIP: NEGATIVE
KETONES UR STRIP-MCNC: NEGATIVE MG/DL
LEUKOCYTE ESTERASE UR QL STRIP: NEGATIVE
NITRATE UR QL: NEGATIVE
PH UR STRIP: 6 PH (ref 5–7)
PSA SERPL-MCNC: 5.2 NG/ML (ref 0–4)
SOURCE: NORMAL
SP GR UR STRIP: 1.02 (ref 1–1.03)
UROBILINOGEN UR STRIP-ACNC: 0.2 EU/DL (ref 0.2–1)

## 2018-04-26 PROCEDURE — 36415 COLL VENOUS BLD VENIPUNCTURE: CPT | Performed by: UROLOGY

## 2018-04-26 PROCEDURE — 84153 ASSAY OF PSA TOTAL: CPT | Performed by: UROLOGY

## 2018-04-26 PROCEDURE — 81003 URINALYSIS AUTO W/O SCOPE: CPT | Performed by: UROLOGY

## 2018-04-26 PROCEDURE — 99203 OFFICE O/P NEW LOW 30 MIN: CPT | Performed by: UROLOGY

## 2018-04-26 RX ORDER — CIPROFLOXACIN 500 MG/1
500 TABLET, FILM COATED ORAL 2 TIMES DAILY
Qty: 6 TABLET | Refills: 0 | Status: SHIPPED | OUTPATIENT
Start: 2018-04-26 | End: 2018-10-30

## 2018-04-26 RX ORDER — INFLUENZA A VIRUS A/VICTORIA/4897/2022 IVR-238 (H1N1) ANTIGEN (FORMALDEHYDE INACTIVATED), INFLUENZA A VIRUS A/CALIFORNIA/122/2022 SAN-022 (H3N2) ANTIGEN (FORMALDEHYDE INACTIVATED), AND INFLUENZA B VIRUS B/MICHIGAN/01/2021 ANTIGEN (FORMALDEHYDE INACTIVATED) 60; 60; 60 UG/.5ML; UG/.5ML; UG/.5ML
INJECTION, SUSPENSION INTRAMUSCULAR
Refills: 0 | COMMUNITY
Start: 2017-12-15 | End: 2022-04-13

## 2018-04-26 ASSESSMENT — PAIN SCALES - GENERAL: PAINLEVEL: NO PAIN (0)

## 2018-04-26 NOTE — PATIENT INSTRUCTIONS
Ultrasound Guided Prostate Biopsy      What is a prostate biopsy?  A prostate biopsy is a relatively painless procedure performed in the physician's office, outpatient facility or hospital.  A long, thin needle is inserted into the prostate to collect a sample of tissue from the prostate.  These samples are then examined by a pathologist for abnormal cells.    Why do I need a prostate biopsy? During a man's lifetime the prostate gradually increases in size.  The patient may or may not experience symptoms.  Symptoms of an enlarge prostate include:    Increased frequency of urination    Decreased force of urinary stream    Trouble with urination    Awakening at night to urinate    When the prostate is examined, the physician may feel a nodule (hard or firm growth) which would require a biopsy.    Another reason for having a prostate biopsy is an increase in the prostate specific androgen (PSA) in your blood.  A prostate biopsy may be necessary to determine the cause of the increased PSA.    Your physician will also perform an ultrasound (an image created by sound waves).  The ultrasound is performed by placing a small probe in the rectum to image the prosate gland.    Preparation for the Prostate Biopsy    1.  During the week before your prostate biopsy substances that may thin your blood (ASPIRIN, BABY ASPIRIN, ACETYLSALICYLIC ACID, ADVIL/MOTRIN, IBUPROFEN, ALEVE, COUMADIN (WARFARIN), PRADAXA, ELIQUIS, PLAVIX, XARELTO, BRILINTA, VITAMIN E, FISH OIL) must be discontinued.  If you are in doubt, please call.  This is a very important requirement and your procedure may be cancelled if you have not stopped taking all of these medications.    2.  If you have any bleeding problems (thin blood), please tell your doctor.    3.  If you have been told by another doctor to take antibiotics before dental work or surgery, please tell the doctor.  This is common for patients that have had a joint replacement.    YOU HAVE BEEN  PRESCRIBED AN ANTIBIOTIC.  Please follow the directions written on the bottle.  The directions usually will tell you to start the antibiotic the day before your biopsy.  You will continue taking the medication until it is gone.    The day of the biopsy you will be asked to use an enema one hour before you leave for your appointment.    Unless you have been prescribed a sedative (Valium or a similar drug) you will be able to drive to and from your appointment.  If you have taken a sedative you must have a ride.    AFTER THE BIOPSY    DANGER SIGNS    Small amount of blood in the urine for 10-14 days Excessive blood in the urine, stool or ejaculate  Small amount of blood in the stool for 48 hours Fever over 100 or chills  Small amount of blood in the ejaculate for up to Frequent urination or burning when you urinate   4 weeks          Your biopsy is scheduled on______05/10/18______________________ at our [place] office.  Please be at     The office at ___1245pm_________________.  A follow up visit has been scheduled for you on     __________05/24/18 at 1130am_____________________ at the  _______[place] office________________________.    Your doctor will discuss your results with you at this visit.    CALL THE DOCTOR'S OFFICE -[phone] IF YOU HAVE ANY OF THESE SYMPTOMS.  IF YOU CANNOT CONTACT THE OFFICE, GO TO THE EMERGENCY ROOM.    _

## 2018-04-26 NOTE — PROGRESS NOTES
[facility]  Main Office: [address]  [address]  [place] [address]       CHIEF COMPLAINT:  Elevated PSA    HISTORY:   This is a 72-year-old gentleman who has had a rising PSA since 2014. It was 3.14 and 2014. It has slowly risen since that time. It was first elevated in January 2017 when it was 4.45. Last month on his most recent check it was 5.1.     He lives in [facility] and was referred here by a friend who I have seen as a patient.  He has no prior urologic history.  He reports no urinary complaints.  He complains of no nocturia and reports normal urinary stream.  He has no history of gross hematuria or infections.  He has no family history of prostate cancer or known BRCA mutations.    In light of his history I recommended that we simply recheck the PSA today before I examined him.  On PSA recheck today the PSA is up a little further at 5.2.    He has a prior history of an undescended testicle on the left side that was removed as a child      PAST MEDICAL HISTORY:   Past Medical History:   Diagnosis Date     Esophageal reflux      Prediabetes        PAST SURGICAL HISTORY:   Past Surgical History:   Procedure Laterality Date     APPENDECTOMY       cystoscopy with pyeloscopy with removal of calculus.       ROTATOR CUFF REPAIR RT/LT       surgery testis exploration of undescended.  1985       FAMILY HISTORY: No family history on file.    SOCIAL HISTORY:   Social History   Substance Use Topics     Smoking status: Never Smoker     Smokeless tobacco: Never Used     Alcohol use Yes        No Known Allergies      Current Outpatient Prescriptions:      aspirin 81 MG tablet, Take 1 tablet by mouth daily, Disp: , Rfl:      Bilberry, Vaccinium myrtillus, 150 MG CAPS, Take 160 mg by mouth, Disp: , Rfl:      blood glucose monitoring (MADAI CONTOUR NEXT) test strip, Use to test blood sugar 1 times daily, Disp: 100 strip, Rfl: 3     blood glucose monitoring (MADAI MICROLET) lancets, Use to test blood  sugar 1 times daily, Disp: 100 each, Rfl: 3     Cetirizine HCl (ZYRTEC ALLERGY PO), , Disp: , Rfl:      Cholecalciferol (VITAMIN D) 2000 UNITS tablet, Take 1 tablet by mouth daily, Disp: , Rfl:      ciprofloxacin (CIPRO) 500 MG tablet, Take 1 tablet (500 mg) by mouth 2 times daily (Patient not taking: Reported on 3/15/2018), Disp: 10 tablet, Rfl: 0     GLUCOSAMINE CHONDROITIN COMPLX PO, Take by mouth 2 times daily, Disp: , Rfl:      mupirocin (BACTROBAN NASAL) 2 % nasal ointment, Apply small amount in each nostril nightly X 14 days, Disp: 10 g, Rfl: 0     omeprazole (PRILOSEC) 20 MG CR capsule, Take 1 capsule (20 mg) by mouth daily, Disp: 90 capsule, Rfl: 1     pravastatin (PRAVACHOL) 40 MG tablet, Take 1 tablet (40 mg) by mouth daily, Disp: 90 tablet, Rfl: 1    Review Of Systems:  Skin: negative  Eyes: negative  Ears/Nose/Throat: negative  Respiratory: No shortness of breath, dyspnea on exertion, cough, or hemoptysis  Cardiovascular: negative  Gastrointestinal: negative  Genitourinary: negative  Musculoskeletal: negative  Neurologic: negative  Psychiatric: negative  Hematologic/Lymphatic/Immunologic: negative  Endocrine: negative      PHYSICAL EXAM:    There were no vitals taken for this visit.  General appearance: In NAD, conversant  HEENT: Normocephalic and atraumatic, anicteric sclera  Cardiovascular: Not examined  Respiratory: normal, non-labored breathing  Gastrointestinal: negative, Abdomen soft, non-tender, and non-distended.   Musculoskeletal: Not Examined  Peripheral Vascular/extremity: No peripheral edema  Skin: Normal temperature, turgor, and texture. No rash  Psychiatric: Appropriate affect, alert and oriented to person, place, and time    Penis: Normal  Scrotal skin: Normal, no lesions  Testicles: Normal to palpation on the right.  Absent left testicle.  Epididymis: Normal to palpation bilaterally  Lymphatic: Normal inguinal lymph nodes  Digital Rectal Exam: His prostate is slightly enlarged but benign  and symmetric to palpation    Cystoscopy: Not done      PSA: 5.2 today, prior to examination    UA RESULTS:  No results for input(s): COLOR, APPEARANCE, URINEGLC, URINEBILI, URINEKETONE, SG, UBLD, URINEPH, PROTEIN, UROBILINOGEN, NITRITE, LEUKEST, RBCU, WBCU in the last [id number] hours.    Bladder Scan:     Other Labs:      Imaging Studies: None      CLINICAL IMPRESSION:   Elevated PSA    PLAN:   He has a consistently elevated PSA that has risen steadily over the past few years.  Given this trend I did recommend further evaluation.  We discussed MRI versus prostate biopsy as the next step in evaluation.  We discussed pros and cons of each option.  He would like to proceed with a prostate biopsy.  We discussed the risks of prostate biopsy, including bleeding and infection.  He wishes to proceed.  Ciprofloxacin prescribed for antibiotic prophylaxis.  I will see him back in the office in the near future for prostate biopsy.      Kamlesh Henry MD

## 2018-04-26 NOTE — MR AVS SNAPSHOT
After Visit Summary   4/26/2018    Agustin Nolan    MRN: [id number]           Patient Information     Date Of Birth          1945        Visit Information        Provider Department      4/26/2018 10:00 AM Kamlesh Henry MD [facility] [place]        Today's Diagnoses     Elevated prostate specific antigen (PSA)          Care Instructions    Ultrasound Guided Prostate Biopsy      What is a prostate biopsy?  A prostate biopsy is a relatively painless procedure performed in the physician's office, outpatient facility or hospital.  A long, thin needle is inserted into the prostate to collect a sample of tissue from the prostate.  These samples are then examined by a pathologist for abnormal cells.    Why do I need a prostate biopsy? During a man's lifetime the prostate gradually increases in size.  The patient may or may not experience symptoms.  Symptoms of an enlarge prostate include:    Increased frequency of urination    Decreased force of urinary stream    Trouble with urination    Awakening at night to urinate    When the prostate is examined, the physician may feel a nodule (hard or firm growth) which would require a biopsy.    Another reason for having a prostate biopsy is an increase in the prostate specific androgen (PSA) in your blood.  A prostate biopsy may be necessary to determine the cause of the increased PSA.    Your physician will also perform an ultrasound (an image created by sound waves).  The ultrasound is performed by placing a small probe in the rectum to image the prosate gland.    Preparation for the Prostate Biopsy    1.  During the week before your prostate biopsy substances that may thin your blood (ASPIRIN, BABY ASPIRIN, ACETYLSALICYLIC ACID, ADVIL/MOTRIN, IBUPROFEN, ALEVE, COUMADIN (WARFARIN), PRADAXA, ELIQUIS, PLAVIX, XARELTO, BRILINTA, VITAMIN E, FISH OIL) must be discontinued.  If you are in doubt, please call.  This is a very  important requirement and your procedure may be cancelled if you have not stopped taking all of these medications.    2.  If you have any bleeding problems (thin blood), please tell your doctor.    3.  If you have been told by another doctor to take antibiotics before dental work or surgery, please tell the doctor.  This is common for patients that have had a joint replacement.    YOU HAVE BEEN PRESCRIBED AN ANTIBIOTIC.  Please follow the directions written on the bottle.  The directions usually will tell you to start the antibiotic the day before your biopsy.  You will continue taking the medication until it is gone.    The day of the biopsy you will be asked to use an enema one hour before you leave for your appointment.    Unless you have been prescribed a sedative (Valium or a similar drug) you will be able to drive to and from your appointment.  If you have taken a sedative you must have a ride.    AFTER THE BIOPSY    DANGER SIGNS    Small amount of blood in the urine for 10-14 days Excessive blood in the urine, stool or ejaculate  Small amount of blood in the stool for 48 hours Fever over 100 or chills  Small amount of blood in the ejaculate for up to Frequent urination or burning when you urinate   4 weeks          Your biopsy is scheduled on______05/10/18______________________ at our [place] office.  Please be at     The office at ___1245pm_________________.  A follow up visit has been scheduled for you on     __________05/24/18 at 1130am_____________________ at the  _______[place] office________________________.    Your doctor will discuss your results with you at this visit.    CALL THE DOCTOR'S OFFICE [phone] IF YOU HAVE ANY OF THESE SYMPTOMS.  IF YOU CANNOT CONTACT THE OFFICE, GO TO THE EMERGENCY ROOM.    _             Follow-ups after your visit        Follow-up notes from your care team     Return for prostate biopsy.      Your next 10 appointments already scheduled     May 10, 2018  1:00 PM CDT  "  (Arrive by 12:45 PM)   Sonography/Biopsy with Kamlesh Henry MD, UA BX ROOM   [facility] [place] ([facility])    [address]  [address]  [facility] [address] [phone]            May 24, 2018 11:30 AM CDT   Return Visit with Kamlesh Henry MD   [facility] ([facility])    [address]  [address]  [facility] [address] [phone]              Who to contact     If you have questions or need follow up information about today's clinic visit or your schedule please contact [facility] [place] directly at [phone].  Normal or non-critical lab and imaging results will be communicated to you by MyChart, letter or phone within 4 business days after the clinic has received the results. If you do not hear from us within 7 days, please contact the clinic through MyChart or phone. If you have a critical or abnormal lab result, we will notify you by phone as soon as possible.  Submit refill requests through "[employer]" or call your pharmacy and they will forward the refill request to us. Please allow 3 business days for your refill to be completed.          Additional Information About Your Visit        [facility] Information     "[employer] gives you secure access to your electronic health record. If you see a primary care provider, you can also send messages to your care team and make appointments. If you have questions, please call your primary care clinic.  If you do not have a primary care provider, please call [phone] and they will assist you.        Care EveryWhere ID     This is your Care EveryWhere ID. This could be used by other organizations to access your [facility] medical records  [id number]        Your Vitals Were     Pulse Height Pulse Oximetry BMI (Body Mass Index)          73 1.778 m (5' 10\") 93% 26.11 kg/m2         Blood Pressure from Last 3 " Encounters:   04/26/18 122/82   03/25/18 114/80   07/14/17 127/83    Weight from Last 3 Encounters:   04/26/18 82.6 kg (182 lb)   03/15/18 86.2 kg (190 lb)   07/14/17 84.4 kg (186 lb)              We Performed the Following     PSA Diag Urologic Phys     UA without Microscopic          Today's Medication Changes          These changes are accurate as of 4/26/18 10:49 AM.  If you have any questions, ask your nurse or doctor.               These medicines have changed or have updated prescriptions.        Dose/Directions    * ciprofloxacin 500 MG tablet   Commonly known as:  CIPRO   This may have changed:  Another medication with the same name was added. Make sure you understand how and when to take each.   Used for:  H/O traveler's diarrhea   Changed by:  Kamlesh Henry MD        Dose:  500 mg   Take 1 tablet (500 mg) by mouth 2 times daily   Quantity:  10 tablet   Refills:  0       * ciprofloxacin 500 MG tablet   Commonly known as:  CIPRO   This may have changed:  You were already taking a medication with the same name, and this prescription was added. Make sure you understand how and when to take each.   Used for:  Elevated prostate specific antigen (PSA)   Changed by:  Kamlesh Henry MD        Dose:  500 mg   Take 1 tablet (500 mg) by mouth 2 times daily   Quantity:  6 tablet   Refills:  0       * Notice:  This list has 2 medication(s) that are the same as other medications prescribed for you. Read the directions carefully, and ask your doctor or other care provider to review them with you.         Where to get your medicines      These medications were sent to [facility]/pharmacy #[id number] [place], [place] - [address]  [address] [facility] [address]     Phone:  [phone]     ciprofloxacin 500 MG tablet                Primary Care Provider Office Phone # Fax #    Vicente Tomlin [phone]       [address] [place] [address]        Equal Access to Services      BRENNAN MCKENZIE : Hadii aad ku maru Childs, waaxda luqadaha, qaybta kaalmada rennyregina, nik britni hayashok stapletonudayrichard carrillo . [facility] [phone].    ATENCIÓN: Si habla español, tiene a bernstein disposición servicios gratuitos de asistencia lingüística. Llame al [phone].    We comply with applicable federal civil rights laws and [place] laws. We do not discriminate on the basis of race, color, national origin, age, disability, sex, sexual orientation, or gender identity.            Thank you!     Thank you for choosing [facility] [place]  for your care. Our goal is always to provide you with excellent care. Hearing back from our patients is one way we can continue to improve our services. Please take a few minutes to complete the written survey that you may receive in the mail after your visit with us. Thank you!             Your Updated Medication List - Protect others around you: Learn how to safely use, store and throw away your medicines at www.disposemymeds.org.          This list is accurate as of 4/26/18 10:49 AM.  Always use your most recent med list.                   Brand Name Dispense Instructions for use Diagnosis    aspirin 81 MG tablet      Take 1 tablet by mouth daily        Bilberry (Vaccinium myrtillus) 150 MG Caps      Take 160 mg by mouth        blood glucose monitoring lancets     100 each    Use to test blood sugar 1 times daily    Diabetes mellitus, type 2 (H)       blood glucose monitoring test strip    MADAI CONTOUR NEXT    100 strip    Use to test blood sugar 1 times daily    Diabetes mellitus, type 2 (H)       * ciprofloxacin 500 MG tablet    CIPRO    10 tablet    Take 1 tablet (500 mg) by mouth 2 times daily    H/O traveler's diarrhea       * ciprofloxacin 500 MG tablet    CIPRO    6 tablet    Take 1 tablet (500 mg) by mouth 2 times daily    Elevated prostate specific antigen (PSA)       FLUZONE HIGH-DOSE 0.5 ML injection   Generic drug:   influenza Vac Split High-Dose      TO BE ADMINISTERED BY THE PHARMACIST.        GLUCOSAMINE CHONDROITIN COMPLX PO      Take by mouth 2 times daily        mupirocin 2 % nasal ointment    BACTROBAN NASAL    10 g    Apply small amount in each nostril nightly X 14 days    Staphylococcus infection of nose       omeprazole 20 MG CR capsule    priLOSEC    90 capsule    Take 1 capsule (20 mg) by mouth daily    Gastroesophageal reflux disease, esophagitis presence not specified       pravastatin 40 MG tablet    PRAVACHOL    90 tablet    Take 1 tablet (40 mg) by mouth daily    Hyperlipidemia LDL goal <100       vitamin D 2000 units tablet      Take 1 tablet by mouth daily        ZYRTEC ALLERGY PO           * Notice:  This list has 2 medication(s) that are the same as other medications prescribed for you. Read the directions carefully, and ask your doctor or other care provider to review them with you.

## 2018-04-26 NOTE — LETTER
4/26/2018       RE: Agustin Nolan  [address]  [facility] [address]     Dear Colleague,    Thank you for referring your patient, Agustin Nolan, to the [facility] [place] at [facility]. Please see a copy of my visit note below.    [facility]  Main Office: [address]  [address]  [place] [address]       CHIEF COMPLAINT:  Elevated PSA    HISTORY:   This is a 72-year-old gentleman who has had a rising PSA since 2014. It was 3.14 and 2014. It has slowly risen since that time. It was first elevated in January 2017 when it was 4.45. Last month on his most recent check it was 5.1.     He lives in [place] [facility] and was referred here by a friend who I have seen as a patient.  He has no prior urologic history.  He reports no urinary complaints.  He complains of no nocturia and reports normal urinary stream.  He has no history of gross hematuria or infections.  He has no family history of prostate cancer or known BRCA mutations.    In light of his history I recommended that we simply recheck the PSA today before I examined him.  On PSA recheck today the PSA is up a little further at 5.2.    He has a prior history of an undescended testicle on the left side that was removed as a child      PAST MEDICAL HISTORY:   Past Medical History:   Diagnosis Date     Esophageal reflux      Prediabetes        PAST SURGICAL HISTORY:   Past Surgical History:   Procedure Laterality Date     APPENDECTOMY       cystoscopy with pyeloscopy with removal of calculus.       ROTATOR CUFF REPAIR RT/LT       surgery testis exploration of undescended.  1985       FAMILY HISTORY: No family history on file.    SOCIAL HISTORY:   Social History   Substance Use Topics     Smoking status: Never Smoker     Smokeless tobacco: Never Used     Alcohol use Yes        No Known Allergies      Current Outpatient Prescriptions:      aspirin 81 MG tablet, Take 1 tablet by  mouth daily, Disp: , Rfl:      Bilberry, Vaccinium myrtillus, 150 MG CAPS, Take 160 mg by mouth, Disp: , Rfl:      blood glucose monitoring (MADAI CONTOUR NEXT) test strip, Use to test blood sugar 1 times daily, Disp: 100 strip, Rfl: 3     blood glucose monitoring (MADAI MICROLET) lancets, Use to test blood sugar 1 times daily, Disp: 100 each, Rfl: 3     Cetirizine HCl (ZYRTEC ALLERGY PO), , Disp: , Rfl:      Cholecalciferol (VITAMIN D) 2000 UNITS tablet, Take 1 tablet by mouth daily, Disp: , Rfl:      ciprofloxacin (CIPRO) 500 MG tablet, Take 1 tablet (500 mg) by mouth 2 times daily (Patient not taking: Reported on 3/15/2018), Disp: 10 tablet, Rfl: 0     GLUCOSAMINE CHONDROITIN COMPLX PO, Take by mouth 2 times daily, Disp: , Rfl:      mupirocin (BACTROBAN NASAL) 2 % nasal ointment, Apply small amount in each nostril nightly X 14 days, Disp: 10 g, Rfl: 0     omeprazole (PRILOSEC) 20 MG CR capsule, Take 1 capsule (20 mg) by mouth daily, Disp: 90 capsule, Rfl: 1     pravastatin (PRAVACHOL) 40 MG tablet, Take 1 tablet (40 mg) by mouth daily, Disp: 90 tablet, Rfl: 1    Review Of Systems:  Skin: negative  Eyes: negative  Ears/Nose/Throat: negative  Respiratory: No shortness of breath, dyspnea on exertion, cough, or hemoptysis  Cardiovascular: negative  Gastrointestinal: negative  Genitourinary: negative  Musculoskeletal: negative  Neurologic: negative  Psychiatric: negative  Hematologic/Lymphatic/Immunologic: negative  Endocrine: negative      PHYSICAL EXAM:    There were no vitals taken for this visit.  General appearance: In NAD, conversant  HEENT: Normocephalic and atraumatic, anicteric sclera  Cardiovascular: Not examined  Respiratory: normal, non-labored breathing  Gastrointestinal: negative, Abdomen soft, non-tender, and non-distended.   Musculoskeletal: Not Examined  Peripheral Vascular/extremity: No peripheral edema  Skin: Normal temperature, turgor, and texture. No rash  Psychiatric: Appropriate affect, alert  and oriented to person, place, and time    Penis: Normal  Scrotal skin: Normal, no lesions  Testicles: Normal to palpation on the right.  Absent left testicle.  Epididymis: Normal to palpation bilaterally  Lymphatic: Normal inguinal lymph nodes  Digital Rectal Exam: His prostate is slightly enlarged but benign and symmetric to palpation    Cystoscopy: Not done      PSA: 5.2 today, prior to examination    UA RESULTS:  No results for input(s): COLOR, APPEARANCE, URINEGLC, URINEBILI, URINEKETONE, SG, UBLD, URINEPH, PROTEIN, UROBILINOGEN, NITRITE, LEUKEST, RBCU, WBCU in the last 09386 hours.    Bladder Scan:     Other Labs:      Imaging Studies: None      CLINICAL IMPRESSION:   Elevated PSA    PLAN:   He has a consistently elevated PSA that has risen steadily over the past few years.  Given this trend I did recommend further evaluation.  We discussed MRI versus prostate biopsy as the next step in evaluation.  We discussed pros and cons of each option.  He would like to proceed with a prostate biopsy.  We discussed the risks of prostate biopsy, including bleeding and infection.  He wishes to proceed.  Ciprofloxacin prescribed for antibiotic prophylaxis.  I will see him back in the office in the near future for prostate biopsy.      Again, thank you for allowing me to participate in the care of your patient.      Sincerely,    Kamlesh Henry MD

## 2018-04-26 NOTE — NURSING NOTE
Chief Complaint   Patient presents with     Elevated PSA     Pt here for elevated PSA with same day PSA     UA RESULTS:  Recent Labs   Lab Test  04/26/18   1007   COLOR  Yellow   APPEARANCE  Clear   URINEGLC  Negative   URINEBILI  Negative   URINEKETONE  Negative   SG  1.020   UBLD  Negative   URINEPH  6.0   PROTEIN  Negative   UROBILINOGEN  0.2   NITRITE  Negative   LEUKEST  Negative     UA done today is clear  Cici Joyce, CMA

## 2018-05-10 ENCOUNTER — OFFICE VISIT (OUTPATIENT)
Dept: UROLOGY | Facility: CLINIC | Age: 73
End: 2018-05-10
Payer: MEDICARE

## 2018-05-10 ENCOUNTER — TRANSFERRED RECORDS (OUTPATIENT)
Dept: HEALTH INFORMATION MANAGEMENT | Facility: CLINIC | Age: 73
End: 2018-05-10

## 2018-05-10 VITALS
HEIGHT: 70 IN | HEART RATE: 80 BPM | DIASTOLIC BLOOD PRESSURE: 84 MMHG | SYSTOLIC BLOOD PRESSURE: 128 MMHG | BODY MASS INDEX: 26.05 KG/M2 | WEIGHT: 182 LBS

## 2018-05-10 DIAGNOSIS — R97.20 ELEVATED PROSTATE SPECIFIC ANTIGEN (PSA): Primary | ICD-10-CM

## 2018-05-10 PROCEDURE — 88305 TISSUE EXAM BY PATHOLOGIST: CPT | Performed by: UROLOGY

## 2018-05-10 PROCEDURE — 76872 US TRANSRECTAL: CPT | Performed by: UROLOGY

## 2018-05-10 PROCEDURE — 55700 ZZHC BIOPSY PROSTATE NEEDLE/PUNCH: CPT | Performed by: UROLOGY

## 2018-05-10 PROCEDURE — 88305 TISSUE EXAM BY PATHOLOGIST: CPT | Mod: 76 | Performed by: UROLOGY

## 2018-05-10 ASSESSMENT — PAIN SCALES - GENERAL: PAINLEVEL: NO PAIN (0)

## 2018-05-10 NOTE — LETTER
5/10/2018       RE: Agustin Nolan  [address] [facility] [address]     Dear Colleague,    Thank you for referring your patient, Agustin Nolan, to the [facility] [place] at [facility]. Please see a copy of my visit note below.    Agustin Nolan is here for a transrectal altrasound guided needle biopsy of the prostate for a significant risk of potentially lethal prostate cancer.    The risks, benefits, of the procudure were discussed.  All questions were answered.  A written informed consent was obtained.      PSA   Date Value Ref Range Status   03/15/2018 5.10 (H) 0 - 4 ug/L Final     Comment:     Assay Method:  Chemiluminescence using Siemens Vista analyzer   01/24/2017 4.45 (H) 0 - 4 ug/L Final     Comment:     Assay Method:  Chemiluminescence using Siemens Vista analyzer   01/21/2016 3.31 0 - 4 ug/L Final   09/03/2014 3.14 0 - 4 ug/L Final       An enema was completed and 500 mg of Cipro twice daily was started prior to the biopsy.  After obtaining informed consent patient was placed in lateral decubitus position.  The ultrasound probe was placed in the rectum.  The prostate was numbed using ultrasound guidance with 1% lidocaine 5 mls along each nerve bundle.      The volume was measured and estimated to be 34 cubic centimeters.      US images were used to guide the biopsies of the prostate.  12 cores were taken with 6 on each side, 2 at the base,  2 at the midgland and  2 at the apex.  The patient tolerated the procedure well.      We will follow up with the results in 7-10 days and contact patient with these results.        Again, thank you for allowing me to participate in the care of your patient.      Sincerely,    Kamlesh Henry MD

## 2018-05-10 NOTE — PROGRESS NOTES
Agustin Nolan is here for a transrectal altrasound guided needle biopsy of the prostate for a significant risk of potentially lethal prostate cancer.    The risks, benefits, of the procudure were discussed.  All questions were answered.  A written informed consent was obtained.      PSA   Date Value Ref Range Status   03/15/2018 5.10 (H) 0 - 4 ug/L Final     Comment:     Assay Method:  Chemiluminescence using Siemens Vista analyzer   01/24/2017 4.45 (H) 0 - 4 ug/L Final     Comment:     Assay Method:  Chemiluminescence using Siemens Vista analyzer   01/21/2016 3.31 0 - 4 ug/L Final   09/03/2014 3.14 0 - 4 ug/L Final       An enema was completed and 500 mg of Cipro twice daily was started prior to the biopsy.  After obtaining informed consent patient was placed in lateral decubitus position.  The ultrasound probe was placed in the rectum.  The prostate was numbed using ultrasound guidance with 1% lidocaine 5 mls along each nerve bundle.      The volume was measured and estimated to be 34 cubic centimeters.      US images were used to guide the biopsies of the prostate.  12 cores were taken with 6 on each side, 2 at the base,  2 at the midgland and  2 at the apex.  The patient tolerated the procedure well.      We will follow up with the results in 7-10 days and contact patient with these results.

## 2018-05-10 NOTE — NURSING NOTE
Pre-Operative    Consent read and signed: Yes   No Known Allergies  Pre-operative antibiotics taken: Yes  Aspirin or other blood thinning medications not taken in 7-10 days:  Yes  Time of Fleet's enema: 11:00 am

## 2018-05-10 NOTE — PATIENT INSTRUCTIONS
[facility]  Transrectal Ultrasound  Post Operative Information    The physician who performed your Transrectal Ultrasound is Dr. Henry (telephone number [phone]).  Please contact this doctor if you have any problems or questions.  If unable to reach your doctor, please return to the Emergency Department.      Take one antibiotic the evening of the procedure and then as directed on your prescription.    Drink at least 6-8 glasses of fluids for the first 48 hours.    Avoid heavy lifting and strenuous activity for 48 hours.    Avoid sexual intercourse for the first 24 hours.    No aspirin or ibuprofen products (Motrin, Advil, Nuprin, ect.) for one week.  You may take acetaminophen (Tylenol) for pain.    You may notice a small amount of blood on the tissue after a bowel movement.    You may pass blood with clots in your urine following the procedure.  The amount will decrease with time but may be visible for up to two weeks.     You make have blood in your semen for 4 weeks after the procedure.    You may experience mild perineal (groin area) discomfort after the procedure.    Please call you doctor if you have any of the follow symptoms:  Fever  Increase in the amount of blood passed  Severe discomfort or pain

## 2018-05-10 NOTE — NURSING NOTE
The following medication was given:     MEDICATION: Xylocaine  ROUTE: MD administered   SITE: MD administered   DOSE: 20 mL  LOT #: [id number]  :   ProRx  EXPIRATION DATE:  11/21  NDC#: 44333-858-87

## 2018-05-10 NOTE — MR AVS SNAPSHOT
After Visit Summary   5/10/2018    Agustin Nolan    MRN: [id number]           Patient Information     Date Of Birth          1945        Visit Information        Provider Department      5/10/2018 1:00 PM Kamlesh Henry MD; UA BX ROOM [facility] [place]        Today's Diagnoses     Elevated prostate specific antigen (PSA)    -  1      Care Instructions    [facility]  Transrectal Ultrasound  Post Operative Information    The physician who performed your Transrectal Ultrasound is Dr. Henry (telephone number [phone]).  Please contact this doctor if you have any problems or questions.  If unable to reach your doctor, please return to the Emergency Department.      Take one antibiotic the evening of the procedure and then as directed on your prescription.    Drink at least 6-8 glasses of fluids for the first 48 hours.    Avoid heavy lifting and strenuous activity for 48 hours.    Avoid sexual intercourse for the first 24 hours.    No aspirin or ibuprofen products (Motrin, Advil, Nuprin, ect.) for one week.  You may take acetaminophen (Tylenol) for pain.    You may notice a small amount of blood on the tissue after a bowel movement.    You may pass blood with clots in your urine following the procedure.  The amount will decrease with time but may be visible for up to two weeks.     You make have blood in your semen for 4 weeks after the procedure.    You may experience mild perineal (groin area) discomfort after the procedure.    Please call you doctor if you have any of the follow symptoms:  Fever  Increase in the amount of blood passed  Severe discomfort or pain            Follow-ups after your visit        Who to contact     If you have questions or need follow up information about today's clinic visit or your schedule please contact [facility] [place] directly at [phone].  Normal or non-critical lab  "and imaging results will be communicated to you by MyChart, letter or phone within 4 business days after the clinic has received the results. If you do not hear from us within 7 days, please contact the clinic through [employer] or phone. If you have a critical or abnormal lab result, we will notify you by phone as soon as possible.  Submit refill requests through [employer] or call your pharmacy and they will forward the refill request to us. Please allow 3 business days for your refill to be completed.          Additional Information About Your Visit        alphacityguidesharFortyCloud Information     [employer] gives you secure access to your electronic health record. If you see a primary care provider, you can also send messages to your care team and make appointments. If you have questions, please call your primary care clinic.  If you do not have a primary care provider, please call [phone] and they will assist you.        Care EveryWhere ID     This is your Care EveryWhere ID. This could be used by other organizations to access your [place] medical records  [id number]        Your Vitals Were     Pulse Height BMI (Body Mass Index)             80 1.778 m (5' 10\") 26.11 kg/m2          Blood Pressure from Last 3 Encounters:   05/10/18 128/84   04/26/18 122/82   03/25/18 114/80    Weight from Last 3 Encounters:   05/10/18 82.6 kg (182 lb)   04/26/18 82.6 kg (182 lb)   03/15/18 86.2 kg (190 lb)              Today, you had the following     No orders found for display       Primary Care Provider Office Phone # Fax #    Vicenteenrique Tomlin [phone]       [address] [place] [address]        Equal Access to Services     [facility]: Hadii jemal Childs, waaxda luqadaha, qaybta kaalnik willis. So [facility] [phone].    ATENCIÓN: Si habla español, tiene a bernstein disposición servicios gratuitos de asistencia lingüística. Llame al [phone].    We comply " with applicable federal civil rights laws and [place] laws. We do not discriminate on the basis of race, color, national origin, age, disability, sex, sexual orientation, or gender identity.            Thank you!     Thank you for choosing [facility] [place]  for your care. Our goal is always to provide you with excellent care. Hearing back from our patients is one way we can continue to improve our services. Please take a few minutes to complete the written survey that you may receive in the mail after your visit with us. Thank you!             Your Updated Medication List - Protect others around you: Learn how to safely use, store and throw away your medicines at www.disposemymeds.org.          This list is accurate as of 5/10/18  1:41 PM.  Always use your most recent med list.                   Brand Name Dispense Instructions for use Diagnosis    aspirin 81 MG tablet      Take 1 tablet by mouth daily        Bilberry (Vaccinium myrtillus) 150 MG Caps      Take 160 mg by mouth        blood glucose monitoring lancets     100 each    Use to test blood sugar 1 times daily    Diabetes mellitus, type 2 (H)       blood glucose monitoring test strip    MADAI CONTOUR NEXT    100 strip    Use to test blood sugar 1 times daily    Diabetes mellitus, type 2 (H)       * ciprofloxacin 500 MG tablet    CIPRO    10 tablet    Take 1 tablet (500 mg) by mouth 2 times daily    H/O traveler's diarrhea       * ciprofloxacin 500 MG tablet    CIPRO    6 tablet    Take 1 tablet (500 mg) by mouth 2 times daily    Elevated prostate specific antigen (PSA)       FLUZONE HIGH-DOSE 0.5 ML injection   Generic drug:  influenza Vac Split High-Dose      TO BE ADMINISTERED BY THE PHARMACIST.        GLUCOSAMINE CHONDROITIN COMPLX PO      Take by mouth 2 times daily        mupirocin 2 % nasal ointment    BACTROBAN NASAL    10 g    Apply small amount in each nostril nightly X 14 days    Staphylococcus infection of  nose       omeprazole 20 MG CR capsule    priLOSEC    90 capsule    Take 1 capsule (20 mg) by mouth daily    Gastroesophageal reflux disease, esophagitis presence not specified       pravastatin 40 MG tablet    PRAVACHOL    90 tablet    Take 1 tablet (40 mg) by mouth daily    Hyperlipidemia LDL goal <100       vitamin D 2000 units tablet      Take 1 tablet by mouth daily        ZYRTEC ALLERGY PO           * Notice:  This list has 2 medication(s) that are the same as other medications prescribed for you. Read the directions carefully, and ask your doctor or other care provider to review them with you.

## 2018-05-14 ENCOUNTER — TELEPHONE (OUTPATIENT)
Dept: UROLOGY | Facility: CLINIC | Age: 73
End: 2018-05-14

## 2018-05-14 LAB — COPATH REPORT: NORMAL

## 2018-05-21 ENCOUNTER — RADIANT APPOINTMENT (OUTPATIENT)
Dept: GENERAL RADIOLOGY | Facility: CLINIC | Age: 73
End: 2018-05-21
Attending: FAMILY MEDICINE
Payer: MEDICARE

## 2018-05-21 ENCOUNTER — OFFICE VISIT (OUTPATIENT)
Dept: ORTHOPEDICS | Facility: CLINIC | Age: 73
End: 2018-05-21
Payer: MEDICARE

## 2018-05-21 VITALS — HEIGHT: 70 IN | HEART RATE: 80 BPM | BODY MASS INDEX: 26.05 KG/M2 | WEIGHT: 182 LBS

## 2018-05-21 DIAGNOSIS — M79.641 RIGHT HAND PAIN: Primary | ICD-10-CM

## 2018-05-21 DIAGNOSIS — M79.641 RIGHT HAND PAIN: ICD-10-CM

## 2018-05-21 NOTE — PATIENT INSTRUCTIONS
Take ibuprofen(advil, motrin) 600mg three times daily with food for two weeks    OR  Naproxen(aleve) two tabs twice daily with food for two weeks

## 2018-05-21 NOTE — PROGRESS NOTES
" [facility] Walk-in Clinic Note      Patient is a 72 year old male who presents to the office today for: right hand pain.    1 week ago was power washing house and noticed pain in the dorsal right hand later that day.  Reports repeated squeezing and grasping with the right hand while power washing.  Did have some swelling particularly over the dorsal aspect of the MCP of the third finger.  Swelling has improved but he continues to have some pain with gripping and extension, particularly of the middle finger.  No other swollen or painful joints.  .  He did take Vicodin from an old prescription which helped some.  Denies weakness, numbness, tingling, clicking, locking, or catching.      Past Medical History, Current Medications, and Allergies are reviewed in the electronic medical record as appropriate.     ROS: Pertinent items are noted in HPI.  Constitutional: negative for fevers, chills and malaise  Cardiovascular: negative for dyspnea, fatigue, lower extremity edema  Integument/breast: negative for rash, skin lesion(s) and skin color change  Neurological: negative for paresthesia and weakness      EXAM:Pulse 80  Ht 5' 10\" (1.778 m)  Wt 182 lb (82.6 kg)  BMI 26.11 kg/m2    General: Alert, pleasant, no distress  Right hand: There is mild soft tissue swelling over the dorsal aspect of the hand, particularly in the region of the second third and fourth MCP.  Nontender to palpation in this area.  No erythema or warmth.  Has pain with firm grasp and extension against resistance, but no weakness is noted.  No deformity.  SI LT throughout hand.  Cap refill is brisk throughout.    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  No significant findings in the area of concern of the second, third, fourth MCP.      Assessment: Patient is a 72 year old male with persistent right hand pain after working " outside 1 week ago.  Suspect tendinitis/synovitis related to overuse.  Possibly exacerbation of DJD though not particularly prominent in the involved joints.    Recommendations:   Reviewed imaging and assessment with the patient in detail.  Recommended relative rest, icing, course of NSAIDs.  Could consider bracing for added rest.  Follow-up in 2 weeks if no improvement.    Julio Pinto MD

## 2018-05-21 NOTE — PROGRESS NOTES
SPORTS & ORTHOPEDIC WALK-IN VISIT 5/21/2018    Primary Care Physician: Dr. Tomlin  Last week Tuesday was power washing house. Noticed pain after. Denies any injury. Swelling in hand.   Reason for visit:     What part of your body is injured / painful?  right hand    What caused the injury /pain? Gripping powerwasher    How long ago did your injury occur or pain begin? a week ago    What are your most bothersome symptoms? Pain and Swelling    How would you characterize your symptom?  aching    What makes your symptoms better? Rest and Other: Oxycodone    What makes your symptoms worse? Movement    Have you been previously seen for this problem? No    Medical History:    Any recent changes to your medical history? No    Any new medication prescribed since last visit? No    Have you had surgery on this body part before? No    Social History:    Occupation: Retired    Handedness: Right    Exercise: None    Review of Systems:    Do you have fever, chills, weight loss? No    Do you have any vision problems? No    Do you have any chest pain or edema? No    Do you have any shortness of breath or wheezing?  No    Do you have stomach problems? No    Do you have any numbness or focal weakness? Neuropathy in feet    Do you have diabetes? Yes, Type 2    Do you have problems with bleeding or clotting? No    Do you have an rashes or other skin lesions? No

## 2018-05-25 ENCOUNTER — OFFICE VISIT (OUTPATIENT)
Dept: UROLOGY | Facility: CLINIC | Age: 73
End: 2018-05-25
Payer: MEDICARE

## 2018-05-25 DIAGNOSIS — C61 MALIGNANT NEOPLASM OF PROSTATE (H): Primary | ICD-10-CM

## 2018-05-25 PROCEDURE — 99214 OFFICE O/P EST MOD 30 MIN: CPT | Performed by: UROLOGY

## 2018-05-25 NOTE — PROGRESS NOTES
Office Visit Note  [facility]  [phone]    UROLOGIC DIAGNOSES:   Prostate cancer    CURRENT INTERVENTIONS:       HISTORY:   Agustin returns to clinic today in order to discuss his recent prostate biopsy results. His biopsies were positive for a [place] 3+3 = 6 prostate cancer in 5 out of 12 cores. The other 7 cores were negative. His prostate was found to be only slightly enlarged at 34 cm . He has no urinary symptoms or complaints.      PAST MEDICAL HISTORY:   Past Medical History:   Diagnosis Date     Esophageal reflux      Prediabetes        PAST SURGICAL HISTORY:   Past Surgical History:   Procedure Laterality Date     APPENDECTOMY       cystoscopy with pyeloscopy with removal of calculus.       ROTATOR CUFF REPAIR RT/LT       surgery testis exploration of undescended.  1985     TESTICLE SURGERY       VASECTOMY         FAMILY HISTORY: No family history on file.    SOCIAL HISTORY:   Social History   Substance Use Topics     Smoking status: Never Smoker     Smokeless tobacco: Never Used     Alcohol use Yes       Current Outpatient Prescriptions   Medication     aspirin 81 MG tablet     Bilberry, Vaccinium myrtillus, 150 MG CAPS     blood glucose monitoring (MADAI CONTOUR NEXT) test strip     blood glucose monitoring (MADAI MICROLET) lancets     Cetirizine HCl (ZYRTEC ALLERGY PO)     Cholecalciferol (VITAMIN D) 2000 UNITS tablet     ciprofloxacin (CIPRO) 500 MG tablet     ciprofloxacin (CIPRO) 500 MG tablet     FLUZONE HIGH-DOSE 0.5 ML injection     GLUCOSAMINE CHONDROITIN COMPLX PO     mupirocin (BACTROBAN NASAL) 2 % nasal ointment     omeprazole (PRILOSEC) 20 MG CR capsule     pravastatin (PRAVACHOL) 40 MG tablet     No current facility-administered medications for this visit.          PHYSICAL EXAM:    There were no vitals taken for this visit.    HEENT: Normocephalic and atraumatic   Cardiac: Not done  Back/Flank: Not done  CNS/PNS: Not done  Respiratory: Normal non-labored breathing  Abdomen:  Soft nontender and nondistended  Peripheral Vascular: Not done  Mental Status: Not done    Penis: Not done  Scrotal Skin: Not done  Testicles: Not done  Epididymis: Not done  Digital Rectal Exam:     Cystoscopy: Not done    Imaging: None    Urinalysis: UA RESULTS:  Recent Labs   Lab Test  04/26/18   1007   COLOR  Yellow   APPEARANCE  Clear   URINEGLC  Negative   URINEBILI  Negative   URINEKETONE  Negative   SG  1.020   UBLD  Negative   URINEPH  6.0   PROTEIN  Negative   UROBILINOGEN  0.2   NITRITE  Negative   LEUKEST  Negative       PSA: 5.2    Post Void Residual:     Other labs: None today      IMPRESSION:  T1C [place] 3+3 = 6 prostate cancer    PLAN:  We had a long discussion today about his prostate cancer diagnosis, natural history of prostate cancer, and treatment options. We discussed active surveillance as well as surgical treatment options and radiotherapy options. We discussed the risks and expected recovery of each option. All of his questions were answered. He is interested in pursuing active surveillance if possible. So far, he has only a low-grade cancer discovered in 5 out of 12 cores. I recommended that his pathology specimen be sent for an Oncotype Dx test and also that we obtain an MRI of the prostate in 6 months in order to rule out a more aggressive for higher volume cancer. This would confirm that he is a good candidate for active surveillance. He wishes to proceed. He understands the risks of active surveillance, including the risks of missing a more aggressive cancer. I will contact him when the results of his Oncotype Dx testing are available and then I will likely need to see him in 6 months for another PSA and MRI of the prostate.     Total Time: 30 minutes                                      Total in Consultation: 30 minutes    Kamlesh Henry M.D.

## 2018-05-25 NOTE — LETTER
5/25/2018     RE: Agustin Nolan  [address] [place] [address]     Dear Colleague,    Thank you for referring your patient, Agustin Nolan, to the [facility] [place] at [facility]. Please see a copy of my visit note below.    Office Visit Note  [facility]  [phone]    UROLOGIC DIAGNOSES:   Prostate cancer    CURRENT INTERVENTIONS:       HISTORY:   Agustin returns to clinic today in order to discuss his recent prostate biopsy results. His biopsies were positive for a [place] 3+3 = 6 prostate cancer in 5 out of 12 cores. The other 7 cores were negative. His prostate was found to be only slightly enlarged at 34 cm . He has no urinary symptoms or complaints.      PAST MEDICAL HISTORY:   Past Medical History:   Diagnosis Date     Esophageal reflux      Prediabetes        PAST SURGICAL HISTORY:   Past Surgical History:   Procedure Laterality Date     APPENDECTOMY       cystoscopy with pyeloscopy with removal of calculus.       ROTATOR CUFF REPAIR RT/LT       surgery testis exploration of undescended.  1985     TESTICLE SURGERY       VASECTOMY         FAMILY HISTORY: No family history on file.    SOCIAL HISTORY:   Social History   Substance Use Topics     Smoking status: Never Smoker     Smokeless tobacco: Never Used     Alcohol use Yes       Current Outpatient Prescriptions   Medication     aspirin 81 MG tablet     Bilberry, Vaccinium myrtillus, 150 MG CAPS     blood glucose monitoring (MADAI CONTOUR NEXT) test strip     blood glucose monitoring (MADAI MICROLET) lancets     Cetirizine HCl (ZYRTEC ALLERGY PO)     Cholecalciferol (VITAMIN D) 2000 UNITS tablet     ciprofloxacin (CIPRO) 500 MG tablet     ciprofloxacin (CIPRO) 500 MG tablet     FLUZONE HIGH-DOSE 0.5 ML injection     GLUCOSAMINE CHONDROITIN COMPLX PO     mupirocin (BACTROBAN NASAL) 2 % nasal ointment     omeprazole (PRILOSEC) 20 MG CR capsule     pravastatin  (PRAVACHOL) 40 MG tablet     No current facility-administered medications for this visit.          PHYSICAL EXAM:    There were no vitals taken for this visit.    HEENT: Normocephalic and atraumatic   Cardiac: Not done  Back/Flank: Not done  CNS/PNS: Not done  Respiratory: Normal non-labored breathing  Abdomen: Soft nontender and nondistended  Peripheral Vascular: Not done  Mental Status: Not done    Penis: Not done  Scrotal Skin: Not done  Testicles: Not done  Epididymis: Not done  Digital Rectal Exam:     Cystoscopy: Not done    Imaging: None    Urinalysis: UA RESULTS:  Recent Labs   Lab Test  04/26/18   1007   COLOR  Yellow   APPEARANCE  Clear   URINEGLC  Negative   URINEBILI  Negative   URINEKETONE  Negative   SG  1.020   UBLD  Negative   URINEPH  6.0   PROTEIN  Negative   UROBILINOGEN  0.2   NITRITE  Negative   LEUKEST  Negative       PSA: 5.2    Post Void Residual:     Other labs: None today      IMPRESSION:  T1C [place] 3+3 = 6 prostate cancer    PLAN:  We had a long discussion today about his prostate cancer diagnosis, natural history of prostate cancer, and treatment options. We discussed active surveillance as well as surgical treatment options and radiotherapy options. We discussed the risks and expected recovery of each option. All of his questions were answered. He is interested in pursuing active surveillance if possible. So far, he has only a low-grade cancer discovered in 5 out of 12 cores. I recommended that his pathology specimen be sent for an Oncotype Dx test and also that we obtain an MRI of the prostate in 6 months in order to rule out a more aggressive for higher volume cancer. This would confirm that he is a good candidate for active surveillance. He wishes to proceed. He understands the risks of active surveillance, including the risks of missing a more aggressive cancer. I will contact him when the results of his Oncotype Dx testing are available and then I will likely need to see him in 6  months for another PSA and MRI of the prostate.     Total Time: 30 minutes                                      Total in Consultation: 30 minutes    Kamlesh Henry M.D.

## 2018-05-25 NOTE — MR AVS SNAPSHOT
After Visit Summary   5/25/2018    Agustin Nolan    MRN: [id number]           Patient Information     Date Of Birth          1945        Visit Information        Provider Department      5/25/2018 11:00 AM Kamlesh Henry MD [facility] [place]        Today's Diagnoses     Malignant neoplasm of prostate (H)    -  1       Follow-ups after your visit        Follow-up notes from your care team     Return in about 6 months (around 11/25/2018) for PSA and MRI prostate.      Future tests that were ordered for you today     Open Future Orders        Priority Expected Expires Ordered    PSA tumor marker Routine  5/25/2019 5/25/2018    MR Prostate Routine  5/25/2019 5/25/2018            Who to contact     If you have questions or need follow up information about today's clinic visit or your schedule please contact [facility] [place] directly at [phone].  Normal or non-critical lab and imaging results will be communicated to you by MyChart, letter or phone within 4 business days after the clinic has received the results. If you do not hear from us within 7 days, please contact the clinic through anfixhart or phone. If you have a critical or abnormal lab result, we will notify you by phone as soon as possible.  Submit refill requests through [employer] Information or call your pharmacy and they will forward the refill request to us. Please allow 3 business days for your refill to be completed.          Additional Information About Your Visit        MyChart Information     [employer] Information gives you secure access to your electronic health record. If you see a primary care provider, you can also send messages to your care team and make appointments. If you have questions, please call your primary care clinic.  If you do not have a primary care provider, please call [phone] and they will assist you.        Care EveryWhere ID     This is your Care  EveryWhere ID. This could be used by other organizations to access your [place] medical records  [id number]         Blood Pressure from Last 3 Encounters:   05/10/18 128/84   04/26/18 122/82   03/25/18 114/80    Weight from Last 3 Encounters:   05/21/18 82.6 kg (182 lb)   05/10/18 82.6 kg (182 lb)   04/26/18 82.6 kg (182 lb)               Primary Care Provider Office Phone # Fax #    Vicente Tolmin [phone]       [address] [place] [address]        Equal Access to Services     [facility] : Hadii aad madelaine hadasho Socristhianali, waaxda luqadaha, qaybta kaalmada adeegyada, nik carrillo . So [facility] [phone].    ATENCIÓN: Si habla español, tiene a bernstein disposición servicios gratuitos de asistencia lingüística. [facility] [phone].    We comply with applicable federal civil rights laws and [place] laws. We do not discriminate on the basis of race, color, national origin, age, disability, sex, sexual orientation, or gender identity.            Thank you!     Thank you for choosing [facility] [place]  for your care. Our goal is always to provide you with excellent care. Hearing back from our patients is one way we can continue to improve our services. Please take a few minutes to complete the written survey that you may receive in the mail after your visit with us. Thank you!             Your Updated Medication List - Protect others around you: Learn how to safely use, store and throw away your medicines at www.disposemymeds.org.          This list is accurate as of 5/25/18 12:03 PM.  Always use your most recent med list.                   Brand Name Dispense Instructions for use Diagnosis    aspirin 81 MG tablet      Take 1 tablet by mouth daily        Bilberry (Vaccinium myrtillus) 150 MG Caps      Take 160 mg by mouth        blood glucose monitoring lancets     100 each    Use to test blood sugar 1 times daily     Diabetes mellitus, type 2 (H)       blood glucose monitoring test strip    MADAI CONTOUR NEXT    100 strip    Use to test blood sugar 1 times daily    Diabetes mellitus, type 2 (H)       * ciprofloxacin 500 MG tablet    CIPRO    10 tablet    Take 1 tablet (500 mg) by mouth 2 times daily    H/O traveler's diarrhea       * ciprofloxacin 500 MG tablet    CIPRO    6 tablet    Take 1 tablet (500 mg) by mouth 2 times daily    Elevated prostate specific antigen (PSA)       FLUZONE HIGH-DOSE 0.5 ML injection   Generic drug:  influenza Vac Split High-Dose      TO BE ADMINISTERED BY THE PHARMACIST.        GLUCOSAMINE CHONDROITIN COMPLX PO      Take by mouth 2 times daily        mupirocin 2 % nasal ointment    BACTROBAN NASAL    10 g    Apply small amount in each nostril nightly X 14 days    Staphylococcus infection of nose       omeprazole 20 MG CR capsule    priLOSEC    90 capsule    Take 1 capsule (20 mg) by mouth daily    Gastroesophageal reflux disease, esophagitis presence not specified       pravastatin 40 MG tablet    PRAVACHOL    90 tablet    Take 1 tablet (40 mg) by mouth daily    Hyperlipidemia LDL goal <100       vitamin D 2000 units tablet      Take 1 tablet by mouth daily        ZYRTEC ALLERGY PO           * Notice:  This list has 2 medication(s) that are the same as other medications prescribed for you. Read the directions carefully, and ask your doctor or other care provider to review them with you.

## 2018-06-14 ENCOUNTER — TELEPHONE (OUTPATIENT)
Dept: FAMILY MEDICINE | Facility: CLINIC | Age: 73
End: 2018-06-14

## 2018-06-14 DIAGNOSIS — E11.9 DIABETES MELLITUS, TYPE 2 (H): ICD-10-CM

## 2018-06-14 NOTE — TELEPHONE ENCOUNTER
Last office visit: 03/15/2018, no future appointment.    Prescription approved per [facility] Refill Protocol.

## 2018-07-12 ENCOUNTER — TELEPHONE (OUTPATIENT)
Dept: UROLOGY | Facility: CLINIC | Age: 73
End: 2018-07-12

## 2018-07-12 DIAGNOSIS — C61 MALIGNANT NEOPLASM OF PROSTATE (H): Primary | ICD-10-CM

## 2018-07-12 NOTE — TELEPHONE ENCOUNTER
"Spoke on phone re:Oncotype Dx results  \"very low\" risk cancer  Planning to see me in November with MRI prostate  "

## 2018-07-26 DIAGNOSIS — E78.5 HYPERLIPIDEMIA LDL GOAL <100: ICD-10-CM

## 2018-07-26 RX ORDER — PRAVASTATIN SODIUM 40 MG
40 TABLET ORAL DAILY
Qty: 90 TABLET | Refills: 2 | Status: SHIPPED | OUTPATIENT
Start: 2018-07-26 | End: 2019-05-03

## 2018-07-26 NOTE — TELEPHONE ENCOUNTER
Prescription approved per [facility] Refill Protocol.    OK for 9 month supply    Aisha Crouch RN  July 26, 2018 4:03 PM

## 2018-10-22 DIAGNOSIS — K21.9 GASTROESOPHAGEAL REFLUX DISEASE, ESOPHAGITIS PRESENCE NOT SPECIFIED: ICD-10-CM

## 2018-10-22 NOTE — TELEPHONE ENCOUNTER
Last office visit:  03/15/2018, no future appointment.    Prescription approved per [facility] Refill Protocol.      Aisha Crouch RN  October 22, 2018 4:14 PM

## 2018-10-30 ENCOUNTER — OFFICE VISIT (OUTPATIENT)
Dept: FAMILY MEDICINE | Facility: CLINIC | Age: 73
End: 2018-10-30
Payer: MEDICARE

## 2018-10-30 VITALS
HEART RATE: 85 BPM | OXYGEN SATURATION: 95 % | DIASTOLIC BLOOD PRESSURE: 89 MMHG | TEMPERATURE: 97.6 F | WEIGHT: 189 LBS | HEIGHT: 70 IN | BODY MASS INDEX: 27.06 KG/M2 | SYSTOLIC BLOOD PRESSURE: 146 MMHG

## 2018-10-30 DIAGNOSIS — L82.1 SEBORRHEIC KERATOSES: Primary | ICD-10-CM

## 2018-10-30 DIAGNOSIS — J34.89 SINUS PRESSURE: ICD-10-CM

## 2018-10-30 ASSESSMENT — PAIN SCALES - GENERAL: PAINLEVEL: NO PAIN (0)

## 2018-10-30 NOTE — PROGRESS NOTES
CHIEF COMPLAINT:  Skin change on the scalp and not feeling well.      HISTORY OF PRESENT ILLNESS:  Agustin is a 73-year-old who initially made this appointment to look at a skin lesion that he noticed for the first time on the right side of his head.  It is in the hair in front of and above the right ear.  When he noticed it, the first thought that went through his head is that he has a melanoma.  I will describe my findings below.  It does not cause any pain.  He has no idea how long it has been there.  He has not been picking at it.  It does not itch.  He has a dark lesion on the left side of his neck above the collarbone that has been there for years.  He did not know if it was related to that.  There is also one on the right side of his neck that had a dark center and that seemed to fall off.  I will describe my findings below.      Since he made this appointment, he has not felt well.  He had some back pain.  He figures that he was lifting something quite heavy (around 100 pounds) that may have strained some things.  He has improved since then.      ACTIVE MEDICAL PROBLEMS:  Reviewed.      CURRENT MEDICATIONS:  Reviewed.      OBJECTIVE:  Agustin is in absolutely no distress.  Affect is upbeat.  He is breathing comfortably.  BP is 146/89 with a pulse of 85.  Temperature is 97.6.  He is 5 feet 10 inches and weighs 189 and has a BMI 27.12.  O2 sats are 95% on room air.  PHQ-9 score was 0.  MAI-7 score was 0.  Meds reviewed.  Examination of his scalp reveals the following:  He has a gray-appearing classic seborrheic keratosis in the hair in front of and just above the right ear.  This looks completely benign.  It is somewhat dry and crusty.  He has a dark seborrheic keratosis on the left side of his neck and a lighter one on the right side.  In fact, he has multiple seborrheic keratoses, and I pointed them out to him.  Reassurance was given.  He has no overt pain with palpation of the sinuses.  He does have some sinus  congestion.  Neck is supple without any anterior or posterior chain adenopathy.  He is wearing hearing aids bilaterally.  Lungs are clear to auscultation bilaterally.      ASSESSMENT/PLAN:   1.  Seborrheic keratoses, benign.  The one that brought him in by the right ear is completely benign and reassurance is given.  However, if anything changes significantly and he is concerned about it, he will certainly let me know, and I can always refer him to Dermatology.   2.  Sinus pressure, but no evidence of a sinus infection.  Over-the-counter remedies suggested.   3.  Some back pain that has since resolved, likely due to lifting up some heavy materials.  He is slowly improving.

## 2018-10-30 NOTE — NURSING NOTE
"73 year old  Chief Complaint   Patient presents with     RECHECK     Wants a lump on the right side of his head checked. Noticed it a few months ago and now it has a dark outline.       Blood pressure 146/89, pulse 85, temperature 97.6  F (36.4  C), temperature source Oral, height 5' 10\" (177.8 cm), weight 189 lb (85.7 kg), SpO2 95 %. Body mass index is 27.12 kg/(m^2).  Patient Active Problem List   Diagnosis     Hyperlipidemia LDL goal <130     Prediabetes     Medicare annual wellness visit, subsequent     Idiopathic peripheral neuropathy     Wears hearing aid       Wt Readings from Last 2 Encounters:   10/30/18 189 lb (85.7 kg)   05/21/18 182 lb (82.6 kg)     BP Readings from Last 3 Encounters:   10/30/18 146/89   05/10/18 128/84   04/26/18 122/82         Current Outpatient Prescriptions   Medication     aspirin 81 MG tablet     blood glucose monitoring (MADAI CONTOUR NEXT) test strip     blood glucose monitoring (MADAI MICROLET) lancets     Cetirizine HCl (ZYRTEC ALLERGY PO)     Cholecalciferol (VITAMIN D) 2000 UNITS tablet     FLUZONE HIGH-DOSE 0.5 ML injection     GLUCOSAMINE CHONDROITIN COMPLX PO     omeprazole (PRILOSEC) 20 MG CR capsule     pravastatin (PRAVACHOL) 40 MG tablet     Bilberry, Vaccinium myrtillus, 150 MG CAPS     mupirocin (BACTROBAN NASAL) 2 % nasal ointment     No current facility-administered medications for this visit.        Social History   Substance Use Topics     Smoking status: Never Smoker     Smokeless tobacco: Never Used     Alcohol use Yes       Health Maintenance Due   Topic Date Due     FOOT EXAM Q1 YEAR  08/01/1946     ADVANCE DIRECTIVE PLANNING Q5 YRS  08/01/2002     FALL RISK ASSESSMENT  07/14/2018     MICROALBUMIN Q1 YEAR  07/14/2018     PHQ-2 Q1 YR  07/14/2018     INFLUENZA VACCINE (1) 09/01/2018     A1C Q6 MO  09/15/2018     EYE EXAM Q1 YEAR  10/17/2018       No results found for: NABIL Dye MA  October 30, 2018 3:16 PM    "

## 2018-10-30 NOTE — MR AVS SNAPSHOT
After Visit Summary   10/30/2018    Agustin Nolan    MRN: [id number]           Patient Information     Date Of Birth          1945        Visit Information        Provider Department      10/30/2018 3:40 PM Vicente Tomlin MD [facility]        Today's Diagnoses     Seborrheic keratoses    -  1    Sinus pressure           Follow-ups after your visit        Follow-up notes from your care team     Return if symptoms worsen or fail to improve.      Your next 10 appointments already scheduled     Nov 07, 2018  1:00 PM CST   MR PROSTATE  WO & W CONTRAST with [id number]   [facility] ([facility])    [address] [address] [phone]           How do I prepare for my exam? (Food and drink instructions) **If you will be receiving sedation or general anesthesia, please see special notes below.**  How do I prepare for my exam? (Other instructions) Take your medicines as usual, unless your doctor tells you not to. You may or may not receive intravenous (IV) contrast for this exam pending the discretion of the Radiologist.  You do not need to do anything special to prepare.  **If you will be receiving sedation or general anesthesia, please see special notes below.**  What should I wear: The MRI machine uses a strong magnet. Please wear clothes without metal (snaps, zippers). A sweatsuit works well, or we may give you a hospital gown. Please remove any body piercings and hair extensions before you arrive. You will also remove watches, jewelry, hairpins, wallets, dentures, partial dental plates and hearing aids. You may wear contact lenses, and you may be able to wear your rings. We have a safe place to keep your personal items, but it is safer to leave them at home.  How long does the exam take: Most tests take 30 to 60 minutes.  HOWEVER, IF YOUR DOCTOR PRESCRIBES ANESTHESIA please plan on spending four to five hours in the  recovery room.  What should I bring:  Bring a list of your current medicines to your exam (including vitamins, minerals and over-the-counter drugs).  Do I need a :  **If you will be receiving sedation or general anesthesia, please see special notes below.**  What should I do after the exam: No Restrictions, You may resume normal activities.  What is this test: MRI (magnetic resonance imaging) uses a strong magnet and radio waves to look inside the body. An MRA (magnetic resonance angiogram) does the same thing, but it lets us look at your blood vessels. A computer turns the radio waves into pictures showing cross sections of the body, much like slices of bread. This helps us see any problems more clearly. You may receive fluid (called  contrast ) before or during your scan. The fluid helps us see the pictures better. We give the fluid through an IV (small needle in your arm).  Who should I call with questions:  Please call the Imaging Department at your exam site with any questions. Directions, parking instructions, and other information is available on our website, [email or web address].  How do I prepare if I m having sedation or anesthesia? **IMPORTANT** THE INSTRUCTIONS BELOW ARE ONLY FOR THOSE PATIENTS WHO HAVE BEEN TOLD THEY WILL RECEIVE SEDATION OR GENERAL ANESTHESIA DURING THEIR MRI PROCEDURE:  IF YOU WILL RECEIVE SEDATION (take medicine to help you relax during your exam): You must get the medicine from your doctor before you arrive. Bring the medicine to the exam. Do not take it at home. Arrive one hour early. Bring someone who can take you home after the test. Your medicine will make you sleepy. After the exam, you may not drive, take a bus or take a taxi by yourself. No eating 8 hours before your exam. You may have clear liquids up until 4 hours before your exam. (Clear liquids include water, clear tea, black coffee and fruit juice without pulp.)  IF YOU WILL RECEIVE ANESTHESIA (be asleep for your  exam): Arrive 1 1/2 hours early. Bring someone who can take you home after the test. You may not drive, take a bus or take a taxi by yourself. No eating 8 hours before your exam. You may have clear liquids up until 4 hours before your exam. (Clear liquids include water, clear tea, black coffee and fruit juice without pulp.)            Nov 15, 2018 10:45 AM CST   Return Visit with Kamlesh Henry MD   [facility] [place] ([facility])    [address]  [address]  [facility] [address]   [phone]            Mar 19, 2019  1:40 PM CDT   PHYSICAL with Vicente oTmlin MD   [facility] ([facility])    [address]  [facility] [address]   [phone]              Who to contact     Please call your clinic at [phone] to:    Ask questions about your health    Make or cancel appointments    Discuss your medicines    Learn about your test results    Speak to your doctor            Additional Information About Your Visit        [employer] Information     [employer] gives you secure access to your electronic health record. If you see a primary care provider, you can also send messages to your care team and make appointments. If you have questions, please call your primary care clinic.  If you do not have a primary care provider, please call [phone] and they will assist you.      [employer] is an electronic gateway that provides easy, online access to your medical records. With [employer], you can request a clinic appointment, read your test results, renew a prescription or communicate with your care team.     To access your existing account, please contact your [facility] or call [phone] for assistance.        Care EveryWhere ID     This is your Care EveryWhere ID. This could be used by other organizations to access your [facility] medical records  [id number]        Your  "Vitals Were     Pulse Temperature Height Pulse Oximetry BMI (Body Mass Index)       85 97.6  F (36.4  C) (Oral) 5' 10\" (177.8 cm) 95% 27.12 kg/m2        Blood Pressure from Last 3 Encounters:   10/30/18 146/89   05/10/18 128/84   04/26/18 122/82    Weight from Last 3 Encounters:   10/30/18 189 lb (85.7 kg)   05/21/18 182 lb (82.6 kg)   05/10/18 182 lb (82.6 kg)              Today, you had the following     No orders found for display       Primary Care Provider Office Phone # Fax #    Vicente Tomlin [phone]       [address] [place] [address]        Equal Access to Services     BRENNAN MCKENZIE : Miles fuo Sokathryn, waaxda luqadaha, qaybta kaalmada adebrigidyaregina, nik carrillo . So [facility] [phone].    ATENCIÓN: Si habla español, tiene a bernstein disposición servicios gratuitos de asistencia lingüística. Llame al [phone].    We comply with applicable federal civil rights laws and [place] laws. We do not discriminate on the basis of race, color, national origin, age, disability, sex, sexual orientation, or gender identity.            Thank you!     Thank you for choosing [facility]  for your care. Our goal is always to provide you with excellent care. Hearing back from our patients is one way we can continue to improve our services. Please take a few minutes to complete the written survey that you may receive in the mail after your visit with us. Thank you!             Your Updated Medication List - Protect others around you: Learn how to safely use, store and throw away your medicines at www.disposemymeds.org.          This list is accurate as of 10/30/18 11:59 PM.  Always use your most recent med list.                   Brand Name Dispense Instructions for use Diagnosis    aspirin 81 MG tablet      Take 1 tablet by mouth daily        Bilberry (Vaccinium myrtillus) 150 MG Caps      Take 160 mg by mouth        blood glucose monitoring lancets "     100 each    Use to test blood sugar 1 times daily    Diabetes mellitus, type 2 (H)       blood glucose monitoring test strip    MADAI CONTOUR NEXT    100 strip    Use to test blood sugar 1 times daily, Accu-check Avaiva Plus Test Stripes    Diabetes mellitus, type 2 (H)       FLUZONE HIGH-DOSE 0.5 ML injection   Generic drug:  influenza Vac Split High-Dose      TO BE ADMINISTERED BY THE PHARMACIST.        GLUCOSAMINE CHONDROITIN COMPLX PO      Take by mouth 2 times daily        mupirocin 2 % nasal ointment    BACTROBAN NASAL    10 g    Apply small amount in each nostril nightly X 14 days    Staphylococcus infection of nose       omeprazole 20 MG CR capsule    priLOSEC    90 capsule    Take 1 capsule (20 mg) by mouth daily    Gastroesophageal reflux disease, esophagitis presence not specified       pravastatin 40 MG tablet    PRAVACHOL    90 tablet    Take 1 tablet (40 mg) by mouth daily    Hyperlipidemia LDL goal <100       vitamin D 2000 units tablet      Take 1 tablet by mouth daily        ZYRTEC ALLERGY PO

## 2018-11-01 ASSESSMENT — ANXIETY QUESTIONNAIRES
7. FEELING AFRAID AS IF SOMETHING AWFUL MIGHT HAPPEN: NOT AT ALL
2. NOT BEING ABLE TO STOP OR CONTROL WORRYING: NOT AT ALL
3. WORRYING TOO MUCH ABOUT DIFFERENT THINGS: NOT AT ALL
5. BEING SO RESTLESS THAT IT IS HARD TO SIT STILL: NOT AT ALL
6. BECOMING EASILY ANNOYED OR IRRITABLE: NOT AT ALL
1. FEELING NERVOUS, ANXIOUS, OR ON EDGE: NOT AT ALL
GAD7 TOTAL SCORE: 0

## 2018-11-01 ASSESSMENT — PATIENT HEALTH QUESTIONNAIRE - PHQ9
SUM OF ALL RESPONSES TO PHQ QUESTIONS 1-9: 0
5. POOR APPETITE OR OVEREATING: NOT AT ALL

## 2018-11-02 ASSESSMENT — ANXIETY QUESTIONNAIRES: GAD7 TOTAL SCORE: 0

## 2018-11-07 ENCOUNTER — RADIANT APPOINTMENT (OUTPATIENT)
Dept: MRI IMAGING | Facility: CLINIC | Age: 73
End: 2018-11-07
Attending: UROLOGY
Payer: MEDICARE

## 2018-11-07 DIAGNOSIS — C61 MALIGNANT NEOPLASM OF PROSTATE (H): ICD-10-CM

## 2018-11-07 RX ORDER — GADOBUTROL 604.72 MG/ML
7.5 INJECTION INTRAVENOUS ONCE
Status: COMPLETED | OUTPATIENT
Start: 2018-11-07 | End: 2018-11-07

## 2018-11-07 RX ADMIN — GADOBUTROL 7.5 ML: 604.72 INJECTION INTRAVENOUS at 13:02

## 2018-11-15 ENCOUNTER — OFFICE VISIT (OUTPATIENT)
Dept: UROLOGY | Facility: CLINIC | Age: 73
End: 2018-11-15
Payer: MEDICARE

## 2018-11-15 VITALS
HEIGHT: 70 IN | OXYGEN SATURATION: 98 % | WEIGHT: 178 LBS | SYSTOLIC BLOOD PRESSURE: 110 MMHG | BODY MASS INDEX: 25.48 KG/M2 | HEART RATE: 75 BPM | DIASTOLIC BLOOD PRESSURE: 60 MMHG

## 2018-11-15 DIAGNOSIS — C61 MALIGNANT NEOPLASM OF PROSTATE (H): Primary | ICD-10-CM

## 2018-11-15 LAB — PSA SERPL-MCNC: 6.2 NG/ML (ref 0–4)

## 2018-11-15 PROCEDURE — 99214 OFFICE O/P EST MOD 30 MIN: CPT | Performed by: UROLOGY

## 2018-11-15 PROCEDURE — 84153 ASSAY OF PSA TOTAL: CPT | Performed by: UROLOGY

## 2018-11-15 PROCEDURE — 36415 COLL VENOUS BLD VENIPUNCTURE: CPT | Performed by: UROLOGY

## 2018-11-15 NOTE — LETTER
11/15/2018       RE: Agustin Nolan  [address] [address]     Dear Colleague,    Thank you for referring your patient, Agustin Nolan, to the [facility] [place] at [facility]. Please see a copy of my visit note below.    Office Visit Note  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 He had an MRI of the prostate performed last week that showed no areas concerning for prostate cancer. His prostate measured 34 cm . He continues to have no urinary symptoms or complaints. His PSA today is 6.2      PAST MEDICAL HISTORY:   Past Medical History:   Diagnosis Date     Esophageal reflux      Prediabetes        PAST SURGICAL HISTORY:   Past Surgical History:   Procedure Laterality Date     APPENDECTOMY       cystoscopy with pyeloscopy with removal of calculus.       ROTATOR CUFF REPAIR RT/LT       surgery testis exploration of undescended.  1985     TESTICLE SURGERY       VASECTOMY         FAMILY HISTORY: No family history on file.    SOCIAL HISTORY:   Social History   Substance Use Topics     Smoking status: Never Smoker     Smokeless tobacco: Never Used     Alcohol use Yes       Current Outpatient Prescriptions   Medication     aspirin 81 MG tablet     blood glucose monitoring (MADAI CONTOUR NEXT) test strip     blood glucose monitoring (MADAI MICROLET) lancets     Cetirizine HCl (ZYRTEC ALLERGY PO)     Cholecalciferol (VITAMIN D) 2000 UNITS tablet     FLUZONE HIGH-DOSE 0.5 ML injection     GLUCOSAMINE CHONDROITIN COMPLX PO     omeprazole (PRILOSEC) 20 MG CR capsule     pravastatin (PRAVACHOL) 40 MG tablet     Bilberry, Vaccinium myrtillus, 150 MG CAPS     mupirocin (BACTROBAN NASAL) 2 % nasal ointment     No current facility-administered medications  "for this visit.          PHYSICAL EXAM:    /60 (BP Location: Left arm, Patient Position: Sitting, Cuff Size: Adult Regular)  Pulse 75  Ht 1.778 m (5' 10\")  Wt 80.7 kg (178 lb)  SpO2 98%  BMI 25.54 kg/m2    Constitutional: Well developed. Conversant and in no acute distress  Eyes: Anicteric sclera, conjunctiva clear, normal extraocular movements  ENT: Normocephalic and atraumatic,   Skin: Warm and dry. No rashes or lesions  Cardiac: No peripheral edema  Back/Flank: Not done  CNS/PNS: Normal musculature and movements, moves all extremities normally  Respiratory: Normal non-labored breathing  Abdomen: Soft nontender and nondistended  Peripheral Vascular: No peripheral edema  Mental Status/Psych: Alert and Oriented x 3. Normal mood and affect    Penis: Not done  Scrotal Skin: Not done  Testicles: Not done  Epididymis: Not done  Digital Rectal Exam:     Cystoscopy: Not done    Imaging: None    Urinalysis: UA RESULTS:  Recent Labs   Lab Test  04/26/18   1007   COLOR  Yellow   APPEARANCE  Clear   URINEGLC  Negative   URINEBILI  Negative   URINEKETONE  Negative   SG  1.020   UBLD  Negative   URINEPH  6.0   PROTEIN  Negative   UROBILINOGEN  0.2   NITRITE  Negative   LEUKEST  Negative       PSA: 6.2    Post Void Residual:     Other labs: None today      IMPRESSION:  Low risk prostate cancer    PLAN:  We went over all his results in detail today. He initially had a low volume of Amber grade 6 prostate cancer discovered on biopsy. Oncotype DX testing confirmed that this was a \"very low risk \"prostate cancer. His MRI was negative. This further suggests that the cancer is likely very small. His PSA has gone up about a point but we will continue to follow this very closely. I answered all his questions about active surveillance and its risks. I will plan on seeing him back in 6 months for another PSA and exam.      Kamlesh Henry M.D.              "

## 2018-11-15 NOTE — MR AVS SNAPSHOT
After Visit Summary   11/15/2018    Agustin Nolan    MRN: [id number]           Patient Information     Date Of Birth          1945        Visit Information        Provider Department      11/15/2018 8:00 AM Kamlesh Henry MD [facility] [place]        Today's Diagnoses     Malignant neoplasm of prostate (H)    -  1       Follow-ups after your visit        Follow-up notes from your care team     Return in about 6 months (around 5/15/2019) for PSA same day.      Your next 10 appointments already scheduled     Mar 19, 2019  1:40 PM CDT   PHYSICAL with Vicente Tomlin MD   [facility] ([facility])    [place] [address]  [facility] [address]   [phone]            May 16, 2019 11:15 AM CDT   (Arrive by 11:00 AM)   Return Visit with Kamlesh Henry MD   [facility] [place] ([facility])    [address]  [address]  [facility] [address] [phone]              Who to contact     If you have questions or need follow up information about today's clinic visit or your schedule please contact [facility] [place] directly at [phone].  Normal or non-critical lab and imaging results will be communicated to you by MyChart, letter or phone within 4 business days after the clinic has received the results. If you do not hear from us within 7 days, please contact the clinic through [employer] or phone. If you have a critical or abnormal lab result, we will notify you by phone as soon as possible.  Submit refill requests through "[employer] or call your pharmacy and they will forward the refill request to us. Please allow 3 business days for your refill to be completed.          Additional Information About Your Visit        MyChart Information     "[employer] gives you secure access to your electronic health record. If you see a  "primary care provider, you can also send messages to your care team and make appointments. If you have questions, please call your primary care clinic.  If you do not have a primary care provider, please call [phone] and they will assist you.        Care EveryWhere ID     This is your Care EveryWhere ID. This could be used by other organizations to access your [facility] medical records  [id number]        Your Vitals Were     Pulse Height Pulse Oximetry BMI (Body Mass Index)          75 1.778 m (5' 10\") 98% 25.54 kg/m2         Blood Pressure from Last 3 Encounters:   11/15/18 110/60   10/30/18 146/89   05/10/18 128/84    Weight from Last 3 Encounters:   11/15/18 80.7 kg (178 lb)   10/30/18 85.7 kg (189 lb)   05/21/18 82.6 kg (182 lb)              We Performed the Following     PSA Diag Urologic Phys        Primary Care Provider Office Phone # Fax #    Vicente Tomlin [phone]       [address] [place] [address]        Equal Access to Services     [facility]: Hadii jemal mahoney Sokathryn, waaxda lucj, qaybta kaalzoltan garcia, nik carrillo . So [facility] [phone].    ATENCIÓN: Si habla español, tiene a bernstein disposición servicios gratuitos de asistencia lingüística. [facility] [phone].    We comply with applicable federal civil rights laws and [place] laws. We do not discriminate on the basis of race, color, national origin, age, disability, sex, sexual orientation, or gender identity.            Thank you!     Thank you for choosing [facility] [place]  for your care. Our goal is always to provide you with excellent care. Hearing back from our patients is one way we can continue to improve our services. Please take a few minutes to complete the written survey that you may receive in the mail after your visit with us. Thank you!             Your Updated Medication List - Protect others around you: Learn how " to safely use, store and throw away your medicines at www.disposemymeds.org.          This list is accurate as of 11/15/18  8:33 AM.  Always use your most recent med list.                   Brand Name Dispense Instructions for use Diagnosis    aspirin 81 MG tablet      Take 1 tablet by mouth daily        Bilberry (Vaccinium myrtillus) 150 MG Caps      Take 160 mg by mouth        blood glucose monitoring lancets     100 each    Use to test blood sugar 1 times daily    Diabetes mellitus, type 2 (H)       blood glucose monitoring test strip    MADAI CONTOUR NEXT    100 strip    Use to test blood sugar 1 times daily, Accu-check Avaiva Plus Test Stripes    Diabetes mellitus, type 2 (H)       FLUZONE HIGH-DOSE 0.5 ML injection   Generic drug:  influenza Vac Split High-Dose      TO BE ADMINISTERED BY THE PHARMACIST.        GLUCOSAMINE CHONDROITIN COMPLX PO      Take by mouth 2 times daily        mupirocin 2 % nasal ointment    BACTROBAN NASAL    10 g    Apply small amount in each nostril nightly X 14 days    Staphylococcus infection of nose       omeprazole 20 MG CR capsule    priLOSEC    90 capsule    Take 1 capsule (20 mg) by mouth daily    Gastroesophageal reflux disease, esophagitis presence not specified       pravastatin 40 MG tablet    PRAVACHOL    90 tablet    Take 1 tablet (40 mg) by mouth daily    Hyperlipidemia LDL goal <100       vitamin D 2000 units tablet      Take 1 tablet by mouth daily        ZYRTEC ALLERGY PO

## 2018-11-15 NOTE — NURSING NOTE
"Chief Complaint   Patient presents with     Hx of Cancer     Patient here for 6 Month SD PSA       Blood pressure 110/60, pulse 75, height 1.778 m (5' 10\"), weight 80.7 kg (178 lb), SpO2 98 %. Body mass index is 25.54 kg/(m^2).    Patient Active Problem List   Diagnosis     Hyperlipidemia LDL goal <130     Prediabetes     Medicare annual wellness visit, subsequent     Idiopathic peripheral neuropathy     Wears hearing aid       No Known Allergies    Current Outpatient Prescriptions   Medication Sig Dispense Refill     aspirin 81 MG tablet Take 1 tablet by mouth daily       blood glucose monitoring (MADAI CONTOUR NEXT) test strip Use to test blood sugar 1 times daily, Accu-check Avaiva Plus Test Stripes 100 strip 3     blood glucose monitoring (MADAI MICROLET) lancets Use to test blood sugar 1 times daily 100 each 3     Cetirizine HCl (ZYRTEC ALLERGY PO)        Cholecalciferol (VITAMIN D) 2000 UNITS tablet Take 1 tablet by mouth daily       FLUZONE HIGH-DOSE 0.5 ML injection TO BE ADMINISTERED BY THE PHARMACIST.  0     GLUCOSAMINE CHONDROITIN COMPLX PO Take by mouth 2 times daily       omeprazole (PRILOSEC) 20 MG CR capsule Take 1 capsule (20 mg) by mouth daily 90 capsule 1     pravastatin (PRAVACHOL) 40 MG tablet Take 1 tablet (40 mg) by mouth daily 90 tablet 2     Bilberry, Vaccinium myrtillus, 150 MG CAPS Take 160 mg by mouth       mupirocin (BACTROBAN NASAL) 2 % nasal ointment Apply small amount in each nostril nightly X 14 days (Patient not taking: Reported on 10/30/2018) 10 g 0       Social History   Substance Use Topics     Smoking status: Never Smoker     Smokeless tobacco: Never Used     Alcohol use Yes       Aniya [place]  11/15/2018         "

## 2018-11-15 NOTE — PROGRESS NOTES
"Office Visit Note  [facility]  [phone]    UROLOGIC DIAGNOSES:   T1C Amber 3+3=6 prostate cancer    CURRENT INTERVENTIONS:   Active surveillance    HISTORY:   Agustin returns to clinic today for follow-up on low-grade prostate cancer. He had an MRI of the prostate performed last week that showed no areas concerning for prostate cancer. His prostate measured 34 cm . He continues to have no urinary symptoms or complaints. His PSA today is 6.2      PAST MEDICAL HISTORY:   Past Medical History:   Diagnosis Date     Esophageal reflux      Prediabetes        PAST SURGICAL HISTORY:   Past Surgical History:   Procedure Laterality Date     APPENDECTOMY       cystoscopy with pyeloscopy with removal of calculus.       ROTATOR CUFF REPAIR RT/LT       surgery testis exploration of undescended.  1985     TESTICLE SURGERY       VASECTOMY         FAMILY HISTORY: No family history on file.    SOCIAL HISTORY:   Social History   Substance Use Topics     Smoking status: Never Smoker     Smokeless tobacco: Never Used     Alcohol use Yes       Current Outpatient Prescriptions   Medication     aspirin 81 MG tablet     blood glucose monitoring (MADAI CONTOUR NEXT) test strip     blood glucose monitoring (MADAI MICROLET) lancets     Cetirizine HCl (ZYRTEC ALLERGY PO)     Cholecalciferol (VITAMIN D) 2000 UNITS tablet     FLUZONE HIGH-DOSE 0.5 ML injection     GLUCOSAMINE CHONDROITIN COMPLX PO     omeprazole (PRILOSEC) 20 MG CR capsule     pravastatin (PRAVACHOL) 40 MG tablet     Bilberry, Vaccinium myrtillus, 150 MG CAPS     mupirocin (BACTROBAN NASAL) 2 % nasal ointment     No current facility-administered medications for this visit.          PHYSICAL EXAM:    /60 (BP Location: Left arm, Patient Position: Sitting, Cuff Size: Adult Regular)  Pulse 75  Ht 1.778 m (5' 10\")  Wt 80.7 kg (178 lb)  SpO2 98%  BMI 25.54 kg/m2    Constitutional: Well developed. Conversant and in no acute distress  Eyes: Anicteric sclera, " "conjunctiva clear, normal extraocular movements  ENT: Normocephalic and atraumatic,   Skin: Warm and dry. No rashes or lesions  Cardiac: No peripheral edema  Back/Flank: Not done  CNS/PNS: Normal musculature and movements, moves all extremities normally  Respiratory: Normal non-labored breathing  Abdomen: Soft nontender and nondistended  Peripheral Vascular: No peripheral edema  Mental Status/Psych: Alert and Oriented x 3. Normal mood and affect    Penis: Not done  Scrotal Skin: Not done  Testicles: Not done  Epididymis: Not done  Digital Rectal Exam:     Cystoscopy: Not done    Imaging: None    Urinalysis: UA RESULTS:  Recent Labs   Lab Test  04/26/18   1007   COLOR  Yellow   APPEARANCE  Clear   URINEGLC  Negative   URINEBILI  Negative   URINEKETONE  Negative   SG  1.020   UBLD  Negative   URINEPH  6.0   PROTEIN  Negative   UROBILINOGEN  0.2   NITRITE  Negative   LEUKEST  Negative       PSA: 6.2    Post Void Residual:     Other labs: None today      IMPRESSION:  Low risk prostate cancer    PLAN:  We went over all his results in detail today. He initially had a low volume of Amber grade 6 prostate cancer discovered on biopsy. Oncotype DX testing confirmed that this was a \"very low risk \"prostate cancer. His MRI was negative. This further suggests that the cancer is likely very small. His PSA has gone up about a point but we will continue to follow this very closely. I answered all his questions about active surveillance and its risks. I will plan on seeing him back in 6 months for another PSA and exam.      Kamlesh Henry M.D.            "

## 2019-03-19 ENCOUNTER — OFFICE VISIT (OUTPATIENT)
Dept: FAMILY MEDICINE | Facility: CLINIC | Age: 74
End: 2019-03-19
Payer: MEDICARE

## 2019-03-19 VITALS
HEART RATE: 82 BPM | DIASTOLIC BLOOD PRESSURE: 77 MMHG | BODY MASS INDEX: 28.14 KG/M2 | SYSTOLIC BLOOD PRESSURE: 118 MMHG | RESPIRATION RATE: 16 BRPM | HEIGHT: 69 IN | TEMPERATURE: 97.9 F | OXYGEN SATURATION: 97 % | WEIGHT: 190 LBS

## 2019-03-19 DIAGNOSIS — Z00.00 PREVENTATIVE HEALTH CARE: Primary | ICD-10-CM

## 2019-03-19 DIAGNOSIS — M25.522 CHRONIC ELBOW PAIN, LEFT: ICD-10-CM

## 2019-03-19 DIAGNOSIS — E78.5 HYPERLIPIDEMIA LDL GOAL <130: ICD-10-CM

## 2019-03-19 DIAGNOSIS — R73.03 PREDIABETES: ICD-10-CM

## 2019-03-19 DIAGNOSIS — G89.29 CHRONIC ELBOW PAIN, LEFT: ICD-10-CM

## 2019-03-19 LAB
ALT SERPL-CCNC: 26 U/L (ref 0–70)
ANION GAP SERPL CALCULATED.3IONS-SCNC: 8 MMOL/L (ref 3–14)
AST SERPL-CCNC: 26 U/L (ref 0–55)
BUN SERPL-MCNC: 16 MG/DL (ref 7–30)
CALCIUM SERPL-MCNC: 9.6 MG/DL (ref 8.5–10.1)
CHLORIDE SERPL-SCNC: 101 MMOL/L (ref 94–109)
CHOLEST SERPL-MCNC: 200 MG/DL (ref 0–200)
CHOLEST/HDLC SERPL: 4 {RATIO} (ref 0–5)
CO2 SERPL-SCNC: 28 MMOL/L (ref 20–32)
CREAT SERPL-MCNC: 1.01 MG/DL (ref 0.66–1.25)
FASTING SPECIMEN: YES
GFR SERPL CREATININE-BSD FRML MDRD: 74 ML/MIN/{1.73_M2}
GLUCOSE SERPL-MCNC: 119 MG/DL (ref 70–99)
GLUCOSE SERPL-MCNC: 132 MG/DL (ref 60–109)
HBA1C MFR BLD: 6.7 % (ref 4.1–5.7)
HDLC SERPL-MCNC: 50 MG/DL
LDLC SERPL CALC-MCNC: 112 MG/DL (ref 0–129)
POTASSIUM SERPL-SCNC: 4.2 MMOL/L (ref 3.4–5.3)
SODIUM SERPL-SCNC: 137 MMOL/L (ref 133–144)
TRIGL SERPL-MCNC: 187 MG/DL (ref 0–150)
VLDL-CHOLESTEROL: 37 (ref 7–32)

## 2019-03-19 ASSESSMENT — MIFFLIN-ST. JEOR: SCORE: 1614.95

## 2019-03-19 NOTE — PROGRESS NOTES
CHIEF COMPLAINT: Medicare annual wellness visit, subsequent      HISTORY OF PRESENT ILLNESS: Agustin Nolan is a 73 year old male who presents for an annual physical. Overall, he is doing well.    His left big toenail has been black since 1/2/19. He does not recall any injury to the toe, but believes he may have kicked something while helping his son move. The toe is mildly painful.    He slipped on ice and fell on 2/5/19. He is still having pain in his left elbow when he puts pressure on a certain part of the elbow. He does not recall any swelling or bruising after the fall.    His blood pressure is elevated today, will recheck. He brought many readings from home on his Ipad, which average 118/77.    He has prediabetes and regularly checks his home glucose readings. He has brought a graph with many home glucose readings (832 readings), which range from , and average 129. We will check a hemoglobin A1c today. He is concerned it will be elevated, because he has not been monitoring his diet as carefully recently.      FAMILY, SOCIAL AND PAST SURGICAL HISTORIES:  Unchanged.       MEDICATIONS:  Carefully reviewed.       HEALTHCARE MAINTENANCE:    Health Maintenance   Topic Date Due     FOOT EXAM Q1 YEAR  08/01/1946     ADVANCE DIRECTIVE PLANNING Q5 YRS  08/01/2002     ZOSTER IMMUNIZATION (2 of 3) 03/24/2014     MICROALBUMIN Q1 YEAR  07/14/2018     A1C Q6 MO  09/15/2018     EYE EXAM Q1 YEAR  10/17/2018     CREATININE Q1 YEAR  03/15/2019     LIPID MONITORING Q1 YEAR  03/15/2019     MEDICARE ANNUAL WELLNESS VISIT  03/15/2019     TSH W/ FREE T4 REFLEX Q2 YEAR  07/14/2019     FALL RISK ASSESSMENT  03/19/2020     PHQ-2 Q1 YR  03/19/2020     COLON CANCER SCREEN (SYSTEM ASSIGNED)  10/28/2020     DTAP/TDAP/TD IMMUNIZATION (3 - Td) 03/08/2022     INFLUENZA VACCINE  Completed     AORTIC ANEURYSM SCREENING (SYSTEM ASSIGNED)  Completed     HEPATITIS C SCREENING  Completed     IPV IMMUNIZATION  Aged Out     MENINGITIS  "IMMUNIZATION  Aged Out       Eye exam: He has had bilateral cataract surgery. Followed closely by ophthalmology.  Hearing: Wears hearing aids.  Dental: Dental care up to date.  BP: Elevated today, will recheck  BMI: Stable  Immunizations: Up to date.  Colonoscopy: Up to date until 2020.  PSA: Follows with Dr. Henry in urology for prostate cancer. Last PSA on 11/15/18 was 6.2.    Medicare Preventive Visit:  1. No problems with ADLs.  2. Recent slip and fall on ice, no other falls. Notes some balance changes and near falls.  3. No depression  4. Not concerned about memory loss. Keeps lists on his phone to help remember.      REVIEW OF SYSTEMS:  A 10-point review is negative.       OBJECTIVE:    GENERAL: Agustin Nolan is in no distress.  Affect is upbeat.   Alert and oriented x3  VITAL SIGNS: BP (!) 154/93 (BP Location: Right arm, Patient Position: Sitting, Cuff Size: Adult Regular)   Pulse 82   Temp 97.9  F (36.6  C) (Oral)   Resp 16   Ht 1.765 m (5' 9.49\")   Wt 86.2 kg (190 lb)   SpO2 97%   BMI 27.67 kg/m    HEENT:  Head is normocephalic, atraumatic. Ears clear bilaterally. No pain with palpation of the frontal or maxillary sinuses.  Eyes are grossly normal.  Nose is free of any congestion or discharge.  Teeth in good repair.  Mucous membranes are moist.  Throat looks benign.  Neck is supple without adenopathy or thyromegaly.  No carotid bruits are heard, no JVD.   BACK:  Smooth and straight.  No pain to percussion.  No CVA tenderness.   LUNGS:  Clear to auscultation bilaterally.   HEART:  Regular rate and rhythm without murmur.   ABDOMEN:  Benign.     EXTREMITIES:  Ankles free of any edema. Full ROM of left elbow. Olecranon minimally tender to palpation. Subungual hemorrhage of left great toe.  SKIN:  Warm and dry.       LABORATORY DATA: Labs pending.      ASSESSMENT AND PLAN:   1. Medicare annual wellness visit, subsequent, UTD with HCM  2. Left elbow pain: Prescribed Voltaren 1% gel.  3. Prediabetes: " Hemoglobin A1c and BMP, pending. Based on results, may refer to nutritionist.  4. Hyperlipidemia: Lipid panel plus, pending.  5. Labs: PSA, pending.  6. F/U with me in ~1 year.    Call with any problems or questions.       I, Nathaly Mariano, am serving as a scribe to document services personally performed by Dr. Vicente Tomlin, based on data collection and the provider's statements to me. Dr. Tomlin has reviewed, edited, and approved the above note.     --Vicente Tomlin MD

## 2019-03-19 NOTE — NURSING NOTE
"73 year old  Chief Complaint   Patient presents with     Physical     Toenail     left big toenail is black since 1/2/19     Shoulder Injury     left shoulder fell on ice 2/5/19 still having pain       Blood pressure (!) 154/93, pulse 82, temperature 97.9  F (36.6  C), temperature source Oral, resp. rate 16, height 1.765 m (5' 9.49\"), weight 86.2 kg (190 lb), SpO2 97 %. Body mass index is 27.67 kg/m .  Patient Active Problem List   Diagnosis     Hyperlipidemia LDL goal <130     Prediabetes     Medicare annual wellness visit, subsequent     Idiopathic peripheral neuropathy     Wears hearing aid       Wt Readings from Last 2 Encounters:   03/19/19 86.2 kg (190 lb)   11/15/18 80.7 kg (178 lb)     BP Readings from Last 3 Encounters:   03/19/19 (!) 154/93   11/15/18 110/60   10/30/18 146/89         Current Outpatient Medications   Medication     aspirin 81 MG tablet     blood glucose monitoring (MADAI CONTOUR NEXT) test strip     blood glucose monitoring (MADAI MICROLET) lancets     Cetirizine HCl (ZYRTEC ALLERGY PO)     Cholecalciferol (VITAMIN D) 2000 UNITS tablet     GLUCOSAMINE CHONDROITIN COMPLX PO     omeprazole (PRILOSEC) 20 MG CR capsule     pravastatin (PRAVACHOL) 40 MG tablet     Bilberry, Vaccinium myrtillus, 150 MG CAPS     FLUZONE HIGH-DOSE 0.5 ML injection     mupirocin (BACTROBAN NASAL) 2 % nasal ointment     No current facility-administered medications for this visit.        Social History     Tobacco Use     Smoking status: Never Smoker     Smokeless tobacco: Never Used   Substance Use Topics     Alcohol use: Yes     Drug use: No       Health Maintenance Due   Topic Date Due     FOOT EXAM Q1 YEAR  08/01/1946     ADVANCE DIRECTIVE PLANNING Q5 YRS  08/01/2002     ZOSTER IMMUNIZATION (2 of 3) 03/24/2014     MICROALBUMIN Q1 YEAR  07/14/2018     A1C Q6 MO  09/15/2018     EYE EXAM Q1 YEAR  10/17/2018     CREATININE Q1 YEAR  03/15/2019     LIPID MONITORING Q1 YEAR  03/15/2019     MEDICARE ANNUAL WELLNESS VISIT  " 03/15/2019       No results found for: PAP      March 19, 2019 1:04 PM

## 2019-05-03 ENCOUNTER — MYC REFILL (OUTPATIENT)
Dept: FAMILY MEDICINE | Facility: CLINIC | Age: 74
End: 2019-05-03

## 2019-05-03 DIAGNOSIS — E78.5 HYPERLIPIDEMIA LDL GOAL <100: ICD-10-CM

## 2019-05-03 DIAGNOSIS — K21.9 GASTROESOPHAGEAL REFLUX DISEASE, ESOPHAGITIS PRESENCE NOT SPECIFIED: ICD-10-CM

## 2019-05-03 RX ORDER — PRAVASTATIN SODIUM 40 MG
40 TABLET ORAL DAILY
Qty: 90 TABLET | Refills: 3 | Status: SHIPPED | OUTPATIENT
Start: 2019-05-03 | End: 2020-04-29

## 2019-05-03 NOTE — TELEPHONE ENCOUNTER
Last seen 3/19/19    Prescription approved per [facility] Refill Protocol.    Sent pt Mychart regarding MD wanting to see him in appt for elevated A1C .    Aisha Crouch RN  May 3, 2019 6:53 PM

## 2019-05-16 ENCOUNTER — OFFICE VISIT (OUTPATIENT)
Dept: UROLOGY | Facility: CLINIC | Age: 74
End: 2019-05-16
Payer: MEDICARE

## 2019-05-16 VITALS
HEIGHT: 70 IN | HEART RATE: 73 BPM | OXYGEN SATURATION: 95 % | BODY MASS INDEX: 26.05 KG/M2 | DIASTOLIC BLOOD PRESSURE: 70 MMHG | SYSTOLIC BLOOD PRESSURE: 130 MMHG | WEIGHT: 182 LBS

## 2019-05-16 DIAGNOSIS — C61 PROSTATE CANCER (H): ICD-10-CM

## 2019-05-16 DIAGNOSIS — C61 PROSTATE CANCER (H): Primary | ICD-10-CM

## 2019-05-16 LAB — PSA SERPL-MCNC: 4.9 NG/ML (ref 0–4)

## 2019-05-16 PROCEDURE — 36415 COLL VENOUS BLD VENIPUNCTURE: CPT | Performed by: UROLOGY

## 2019-05-16 PROCEDURE — 99213 OFFICE O/P EST LOW 20 MIN: CPT | Performed by: UROLOGY

## 2019-05-16 PROCEDURE — 84153 ASSAY OF PSA TOTAL: CPT | Performed by: UROLOGY

## 2019-05-16 ASSESSMENT — MIFFLIN-ST. JEOR: SCORE: 1578.86

## 2019-05-16 ASSESSMENT — PAIN SCALES - GENERAL: PAINLEVEL: NO PAIN (0)

## 2019-05-16 NOTE — PROGRESS NOTES
Office Visit Note  [facility]  [phone]    UROLOGIC DIAGNOSES:   Low risk prostate cancer    CURRENT INTERVENTIONS:   Active surveillance    HISTORY:   Agustin returns to clinic today for continued active surveillance of his  Low-grade prostate cancer. PSA today is back down to 4.9. He has no urinary symptoms or complaints.      PAST MEDICAL HISTORY:   Past Medical History:   Diagnosis Date     Esophageal reflux      Prediabetes        PAST SURGICAL HISTORY:   Past Surgical History:   Procedure Laterality Date     APPENDECTOMY       cystoscopy with pyeloscopy with removal of calculus.       ROTATOR CUFF REPAIR RT/LT       surgery testis exploration of undescended.  1985     TESTICLE SURGERY       VASECTOMY         FAMILY HISTORY: No family history on file.    SOCIAL HISTORY:   Social History     Tobacco Use     Smoking status: Never Smoker     Smokeless tobacco: Never Used   Substance Use Topics     Alcohol use: Yes       Current Outpatient Medications   Medication     aspirin 81 MG tablet     Bilberry, Vaccinium myrtillus, 150 MG CAPS     blood glucose monitoring (MADAI CONTOUR NEXT) test strip     blood glucose monitoring (MADAI MICROLET) lancets     Cetirizine HCl (ZYRTEC ALLERGY PO)     Cholecalciferol (VITAMIN D) 2000 UNITS tablet     diclofenac (VOLTAREN) 1 % topical gel     FLUZONE HIGH-DOSE 0.5 ML injection     GLUCOSAMINE CHONDROITIN COMPLX PO     mupirocin (BACTROBAN NASAL) 2 % nasal ointment     omeprazole (PRILOSEC) 20 MG DR capsule     pravastatin (PRAVACHOL) 40 MG tablet     No current facility-administered medications for this visit.          PHYSICAL EXAM:    There were no vitals taken for this visit.    Constitutional: Well developed. Conversant and in no acute distress  Eyes: Anicteric sclera, conjunctiva clear, normal extraocular movements  ENT: Normocephalic and atraumatic,   Skin: Warm and dry. No rashes or lesions  Cardiac: No peripheral edema  Back/Flank: Not done  CNS/PNS:  Normal musculature and movements, moves all extremities normally  Respiratory: Normal non-labored breathing  Abdomen: Soft nontender and nondistended  Peripheral Vascular: No peripheral edema  Mental Status/Psych: Alert and Oriented x 3. Normal mood and affect    Penis: Not done  Scrotal Skin: Not done  Testicles: Not done  Epididymis: Not done  Digital Rectal Exam: the prostate is only slightly enlarged, benign and symmetric to palpation.    Cystoscopy: Not done    Imaging: None    Urinalysis: UA RESULTS:  Recent Labs   Lab Test 04/26/18  1007   COLOR Yellow   APPEARANCE Clear   URINEGLC Negative   URINEBILI Negative   URINEKETONE Negative   SG 1.020   UBLD Negative   URINEPH 6.0   PROTEIN Negative   UROBILINOGEN 0.2   NITRITE Negative   LEUKEST Negative       PSA: 4.9    Post Void Residual:     Other labs: None today      IMPRESSION:  Doing well    PLAN:  We is doing well with a stable PSA and exam. We again discussed  The plan for active surveillance as well as the risks and he wishes to continue with surveillance. He will require a repeat biopsy within the next year as well. I will plan on seeing him back in 6 months for his next PSA and exam      Kamlesh Henry M.D.

## 2019-05-16 NOTE — NURSING NOTE
"Chief Complaint   Patient presents with     Hx of Prostate Cancer     Patient here today for SD PSA and Exam       Blood pressure 130/70, pulse 73, height 1.765 m (5' 9.5\"), weight 82.6 kg (182 lb), SpO2 95 %. Body mass index is 26.49 kg/m .    Patient Active Problem List   Diagnosis     Hyperlipidemia LDL goal <130     Prediabetes     Medicare annual wellness visit, subsequent     Idiopathic peripheral neuropathy     Wears hearing aid       No Known Allergies    Current Outpatient Medications   Medication Sig Dispense Refill     aspirin 81 MG tablet Take 1 tablet by mouth daily       Bilberry, Vaccinium myrtillus, 150 MG CAPS Take 160 mg by mouth       blood glucose monitoring (MADAI CONTOUR NEXT) test strip Use to test blood sugar 1 times daily, Accu-check Avaiva Plus Test Stripes 100 strip 3     blood glucose monitoring (MADAI MICROLET) lancets Use to test blood sugar 1 times daily 100 each 3     Cetirizine HCl (ZYRTEC ALLERGY PO)        Cholecalciferol (VITAMIN D) 2000 UNITS tablet Take 1 tablet by mouth daily       diclofenac (VOLTAREN) 1 % topical gel Place onto the skin 4 times daily 1 Tube 1     FLUZONE HIGH-DOSE 0.5 ML injection TO BE ADMINISTERED BY THE PHARMACIST.  0     GLUCOSAMINE CHONDROITIN COMPLX PO Take by mouth 2 times daily       mupirocin (BACTROBAN NASAL) 2 % nasal ointment Apply small amount in each nostril nightly X 14 days (Patient not taking: Reported on 10/30/2018) 10 g 0     omeprazole (PRILOSEC) 20 MG DR capsule Take 1 capsule (20 mg) by mouth daily 90 capsule 3     pravastatin (PRAVACHOL) 40 MG tablet Take 1 tablet (40 mg) by mouth daily 90 tablet 3       Social History     Tobacco Use     Smoking status: Never Smoker     Smokeless tobacco: Never Used   Substance Use Topics     Alcohol use: Yes     Drug use: No       KEYONNA Verdugo  5/16/2019  12:04 PM       "

## 2019-05-16 NOTE — LETTER
5/16/2019     RE: Agustin Nolan  [address] [facility] [address]     Dear Colleague,    Thank you for referring your patient, Agustin Nolan, to the [facility] [place] [facility]. Please see a copy of my visit note below.    Office Visit Note  [facility]  [phone]    UROLOGIC DIAGNOSES:   Low risk prostate cancer    CURRENT INTERVENTIONS:   Active surveillance    HISTORY:   Agustin returns to clinic today for continued active surveillance of his  Low-grade prostate cancer. PSA today is back down to 4.9. He has no urinary symptoms or complaints.      PAST MEDICAL HISTORY:   Past Medical History:   Diagnosis Date     Esophageal reflux      Prediabetes        PAST SURGICAL HISTORY:   Past Surgical History:   Procedure Laterality Date     APPENDECTOMY       cystoscopy with pyeloscopy with removal of calculus.       ROTATOR CUFF REPAIR RT/LT       surgery testis exploration of undescended.  1985     TESTICLE SURGERY       VASECTOMY         FAMILY HISTORY: No family history on file.    SOCIAL HISTORY:   Social History     Tobacco Use     Smoking status: Never Smoker     Smokeless tobacco: Never Used   Substance Use Topics     Alcohol use: Yes       Current Outpatient Medications   Medication     aspirin 81 MG tablet     Bilberry, Vaccinium myrtillus, 150 MG CAPS     blood glucose monitoring (MADAI CONTOUR NEXT) test strip     blood glucose monitoring (MADAI MICROLET) lancets     Cetirizine HCl (ZYRTEC ALLERGY PO)     Cholecalciferol (VITAMIN D) 2000 UNITS tablet     diclofenac (VOLTAREN) 1 % topical gel     FLUZONE HIGH-DOSE 0.5 ML injection     GLUCOSAMINE CHONDROITIN COMPLX PO     mupirocin (BACTROBAN NASAL) 2 % nasal ointment     omeprazole (PRILOSEC) 20 MG DR capsule     pravastatin (PRAVACHOL) 40 MG tablet     No current facility-administered medications for this visit.          PHYSICAL EXAM:    There were no vitals  taken for this visit.    Constitutional: Well developed. Conversant and in no acute distress  Eyes: Anicteric sclera, conjunctiva clear, normal extraocular movements  ENT: Normocephalic and atraumatic,   Skin: Warm and dry. No rashes or lesions  Cardiac: No peripheral edema  Back/Flank: Not done  CNS/PNS: Normal musculature and movements, moves all extremities normally  Respiratory: Normal non-labored breathing  Abdomen: Soft nontender and nondistended  Peripheral Vascular: No peripheral edema  Mental Status/Psych: Alert and Oriented x 3. Normal mood and affect    Penis: Not done  Scrotal Skin: Not done  Testicles: Not done  Epididymis: Not done  Digital Rectal Exam: the prostate is only slightly enlarged, benign and symmetric to palpation.    Cystoscopy: Not done    Imaging: None    Urinalysis: UA RESULTS:  Recent Labs   Lab Test 04/26/18  1007   COLOR Yellow   APPEARANCE Clear   URINEGLC Negative   URINEBILI Negative   URINEKETONE Negative   SG 1.020   UBLD Negative   URINEPH 6.0   PROTEIN Negative   UROBILINOGEN 0.2   NITRITE Negative   LEUKEST Negative       PSA: 4.9    Post Void Residual:     Other labs: None today      IMPRESSION:  Doing well    PLAN:  We is doing well with a stable PSA and exam. We again discussed  The plan for active surveillance as well as the risks and he wishes to continue with surveillance. He will require a repeat biopsy within the next year as well. I will plan on seeing him back in 6 months for his next PSA and exam      Kamlesh Henry M.D.

## 2019-05-30 DIAGNOSIS — E11.9 DIABETES MELLITUS, TYPE 2 (H): ICD-10-CM

## 2019-05-30 NOTE — TELEPHONE ENCOUNTER
Last office visit was on 03/19/2019, no future visits scheduled.  Last ordered: 6/14/18 #100 + 3 refills    Prescription approved per [facility] Refill Protocol.  Wendy Byrd RN  05/31/19  8:09 AM

## 2019-11-04 ENCOUNTER — HEALTH MAINTENANCE LETTER (OUTPATIENT)
Age: 74
End: 2019-11-04

## 2019-11-15 DIAGNOSIS — C61 PROSTATE CANCER (H): Primary | ICD-10-CM

## 2019-11-21 ENCOUNTER — OFFICE VISIT (OUTPATIENT)
Dept: UROLOGY | Facility: CLINIC | Age: 74
End: 2019-11-21
Payer: MEDICARE

## 2019-11-21 VITALS
SYSTOLIC BLOOD PRESSURE: 128 MMHG | WEIGHT: 176 LBS | HEART RATE: 63 BPM | HEIGHT: 70 IN | DIASTOLIC BLOOD PRESSURE: 88 MMHG | BODY MASS INDEX: 25.2 KG/M2 | OXYGEN SATURATION: 97 %

## 2019-11-21 DIAGNOSIS — C61 PROSTATE CANCER (H): ICD-10-CM

## 2019-11-21 LAB — PSA SERPL-MCNC: 5.3 NG/ML (ref 0–4)

## 2019-11-21 PROCEDURE — 36415 COLL VENOUS BLD VENIPUNCTURE: CPT | Performed by: UROLOGY

## 2019-11-21 PROCEDURE — 99214 OFFICE O/P EST MOD 30 MIN: CPT | Performed by: UROLOGY

## 2019-11-21 PROCEDURE — 84153 ASSAY OF PSA TOTAL: CPT | Performed by: UROLOGY

## 2019-11-21 RX ORDER — CIPROFLOXACIN 500 MG/1
500 TABLET, FILM COATED ORAL 2 TIMES DAILY
Qty: 6 TABLET | Refills: 0 | Status: SHIPPED | OUTPATIENT
Start: 2019-11-21 | End: 2020-04-29

## 2019-11-21 ASSESSMENT — MIFFLIN-ST. JEOR: SCORE: 1546.64

## 2019-11-21 ASSESSMENT — PAIN SCALES - GENERAL: PAINLEVEL: NO PAIN (0)

## 2019-11-21 NOTE — NURSING NOTE
Chief Complaint   Patient presents with     psa     patient is here for same day PSA     Candy Obrien LPN

## 2019-11-21 NOTE — PROGRESS NOTES
"Office Visit Note  [facility]  [phone]    UROLOGIC DIAGNOSES:   Low risk prostate cancer    CURRENT INTERVENTIONS:   Active surveillance, biopsy May 2018    HISTORY:   Agustin returns to clinic today for continued active surveillance of his low risk prostate cancer.  His PSA today is relatively stable at 5.3.  He continues to have no urinary symptoms or complaints.      PAST MEDICAL HISTORY:   Past Medical History:   Diagnosis Date     Esophageal reflux      Prediabetes        PAST SURGICAL HISTORY:   Past Surgical History:   Procedure Laterality Date     APPENDECTOMY       cystoscopy with pyeloscopy with removal of calculus.       ROTATOR CUFF REPAIR RT/LT       surgery testis exploration of undescended.  1985     TESTICLE SURGERY       VASECTOMY         FAMILY HISTORY: No family history on file.    SOCIAL HISTORY:   Social History     Tobacco Use     Smoking status: Never Smoker     Smokeless tobacco: Never Used   Substance Use Topics     Alcohol use: Yes       Current Outpatient Medications   Medication     Cetirizine HCl (ZYRTEC ALLERGY PO)     Cholecalciferol (VITAMIN D) 2000 UNITS tablet     GLUCOSAMINE CHONDROITIN COMPLX PO     omeprazole (PRILOSEC) 20 MG DR capsule     pravastatin (PRAVACHOL) 40 MG tablet     aspirin 81 MG tablet     Bilberry, Vaccinium myrtillus, 150 MG CAPS     blood glucose (MADAI CONTOUR NEXT) test strip     blood glucose monitoring (MADAI MICROLET) lancets     diclofenac (VOLTAREN) 1 % topical gel     FLUZONE HIGH-DOSE 0.5 ML injection     mupirocin (BACTROBAN NASAL) 2 % nasal ointment     No current facility-administered medications for this visit.          PHYSICAL EXAM:    /88   Pulse 63   Ht 1.765 m (5' 9.5\")   Wt 79.8 kg (176 lb)   SpO2 97%   BMI 25.62 kg/m      Constitutional: Well developed. Conversant and in no acute distress  Eyes: Anicteric sclera, conjunctiva clear, normal extraocular movements  ENT: Normocephalic and atraumatic,   Skin: Warm and " dry. No rashes or lesions  Cardiac: No peripheral edema  Back/Flank: Not done  CNS/PNS: Normal musculature and movements, moves all extremities normally  Respiratory: Normal non-labored breathing  Abdomen: Soft nontender and nondistended  Peripheral Vascular: No peripheral edema  Mental Status/Psych: Alert and Oriented x 3. Normal mood and affect    Penis: Not done  Scrotal Skin: Not done  Testicles: Not done  Epididymis: Not done  Digital Rectal Exam: The prostate is slightly enlarged, benign and symmetric to palpation    Cystoscopy: Not done    Imaging: None    Urinalysis: UA RESULTS:  Recent Labs   Lab Test 04/26/18  1007   COLOR Yellow   APPEARANCE Clear   URINEGLC Negative   URINEBILI Negative   URINEKETONE Negative   SG 1.020   UBLD Negative   URINEPH 6.0   PROTEIN Negative   UROBILINOGEN 0.2   NITRITE Negative   LEUKEST Negative       PSA: 5.3    Post Void Residual:     Other labs: None today      IMPRESSION:  Low risk prostate cancer    PLAN:  He is doing well on active surveillance for a low risk prostate cancer.  His PSA and exam are relatively unchanged.  We discussed the plan for active surveillance.  I do recommend he have a repeat prostate biopsy at the two-year alise, which would be 6 months from now.  We discussed prostate biopsy again today along with its risks, including bleeding and infection.  We discussed the increased risk of infection with a repeat biopsy.  He agrees to the procedure.  He will receive oral ciprofloxacin as well as gentamicin injection just prior to the procedure.  In 6 months we will check his PSA again and proceed with repeat biopsy.      Kamlesh Henry M.D.

## 2019-11-21 NOTE — LETTER
11/21/2019     RE: Agustin Nolan  [address] [facility] [address]     Dear Colleague,    Thank you for referring your patient, Agustin Nolan, to the [facility] [place] at [facility]. Please see a copy of my visit note below.    Office Visit Note  [facility]  [phone]    UROLOGIC DIAGNOSES:   Low risk prostate cancer    CURRENT INTERVENTIONS:   Active surveillance, biopsy May 2018    HISTORY:   Agustin returns to clinic today for continued active surveillance of his low risk prostate cancer.  His PSA today is relatively stable at 5.3.  He continues to have no urinary symptoms or complaints.      PAST MEDICAL HISTORY:   Past Medical History:   Diagnosis Date     Esophageal reflux      Prediabetes        PAST SURGICAL HISTORY:   Past Surgical History:   Procedure Laterality Date     APPENDECTOMY       cystoscopy with pyeloscopy with removal of calculus.       ROTATOR CUFF REPAIR RT/LT       surgery testis exploration of undescended.  1985     TESTICLE SURGERY       VASECTOMY         FAMILY HISTORY: No family history on file.    SOCIAL HISTORY:   Social History     Tobacco Use     Smoking status: Never Smoker     Smokeless tobacco: Never Used   Substance Use Topics     Alcohol use: Yes       Current Outpatient Medications   Medication     Cetirizine HCl (ZYRTEC ALLERGY PO)     Cholecalciferol (VITAMIN D) 2000 UNITS tablet     GLUCOSAMINE CHONDROITIN COMPLX PO     omeprazole (PRILOSEC) 20 MG DR capsule     pravastatin (PRAVACHOL) 40 MG tablet     aspirin 81 MG tablet     Bilberry, Vaccinium myrtillus, 150 MG CAPS     blood glucose (MADAI CONTOUR NEXT) test strip     blood glucose monitoring (MADAI MICROLET) lancets     diclofenac (VOLTAREN) 1 % topical gel     FLUZONE HIGH-DOSE 0.5 ML injection     mupirocin (BACTROBAN NASAL) 2 % nasal ointment     No current facility-administered medications for this visit.          PHYSICAL  "EXAM:    /88   Pulse 63   Ht 1.765 m (5' 9.5\")   Wt 79.8 kg (176 lb)   SpO2 97%   BMI 25.62 kg/m       Constitutional: Well developed. Conversant and in no acute distress  Eyes: Anicteric sclera, conjunctiva clear, normal extraocular movements  ENT: Normocephalic and atraumatic,   Skin: Warm and dry. No rashes or lesions  Cardiac: No peripheral edema  Back/Flank: Not done  CNS/PNS: Normal musculature and movements, moves all extremities normally  Respiratory: Normal non-labored breathing  Abdomen: Soft nontender and nondistended  Peripheral Vascular: No peripheral edema  Mental Status/Psych: Alert and Oriented x 3. Normal mood and affect    Penis: Not done  Scrotal Skin: Not done  Testicles: Not done  Epididymis: Not done  Digital Rectal Exam: The prostate is slightly enlarged, benign and symmetric to palpation    Cystoscopy: Not done    Imaging: None    Urinalysis: UA RESULTS:  Recent Labs   Lab Test 04/26/18  1007   COLOR Yellow   APPEARANCE Clear   URINEGLC Negative   URINEBILI Negative   URINEKETONE Negative   SG 1.020   UBLD Negative   URINEPH 6.0   PROTEIN Negative   UROBILINOGEN 0.2   NITRITE Negative   LEUKEST Negative       PSA: 5.3    Post Void Residual:     Other labs: None today      IMPRESSION:  Low risk prostate cancer    PLAN:  He is doing well on active surveillance for a low risk prostate cancer.  His PSA and exam are relatively unchanged.  We discussed the plan for active surveillance.  I do recommend he have a repeat prostate biopsy at the two-year alise, which would be 6 months from now.  We discussed prostate biopsy again today along with its risks, including bleeding and infection.  We discussed the increased risk of infection with a repeat biopsy.  He agrees to the procedure.  He will receive oral ciprofloxacin as well as gentamicin injection just prior to the procedure.  In 6 months we will check his PSA again and proceed with repeat biopsy.      Kamlesh Henry M.D.          "

## 2020-01-22 ENCOUNTER — TRANSFERRED RECORDS (OUTPATIENT)
Dept: HEALTH INFORMATION MANAGEMENT | Facility: CLINIC | Age: 75
End: 2020-01-22

## 2020-04-29 DIAGNOSIS — E78.5 HYPERLIPIDEMIA LDL GOAL <100: ICD-10-CM

## 2020-04-29 DIAGNOSIS — K21.9 GASTROESOPHAGEAL REFLUX DISEASE, ESOPHAGITIS PRESENCE NOT SPECIFIED: ICD-10-CM

## 2020-04-29 RX ORDER — PRAVASTATIN SODIUM 40 MG
40 TABLET ORAL DAILY
Qty: 90 TABLET | Refills: 0 | Status: SHIPPED | OUTPATIENT
Start: 2020-04-29 | End: 2020-07-22

## 2020-04-29 NOTE — TELEPHONE ENCOUNTER
pravastatin: Last time prescribed: 5/3/19 , 90 tabs x 3 refills  omeprazole: Last time prescribed: 5/3/19 , 90 caps x 3 refills    Last office visit: 3/19/19  Next appointment: 7/14/2020    COVID-19, 90-day refill authorization per Medical Director override.    Wendy Byrd RN  04/29/20  11:13 AM

## 2020-05-20 ENCOUNTER — PRE VISIT (OUTPATIENT)
Dept: UROLOGY | Facility: CLINIC | Age: 75
End: 2020-05-20

## 2020-05-20 NOTE — TELEPHONE ENCOUNTER
Spoke to patient about tomorrow appointment  Told he can come at 8am to have his Gentamicin    KEYONNA Verdugo

## 2020-05-21 ENCOUNTER — ALLIED HEALTH/NURSE VISIT (OUTPATIENT)
Dept: UROLOGY | Facility: CLINIC | Age: 75
End: 2020-05-21
Payer: MEDICARE

## 2020-05-21 ENCOUNTER — OFFICE VISIT (OUTPATIENT)
Dept: UROLOGY | Facility: CLINIC | Age: 75
End: 2020-05-21
Payer: MEDICARE

## 2020-05-21 VITALS
OXYGEN SATURATION: 95 % | HEART RATE: 83 BPM | WEIGHT: 185 LBS | SYSTOLIC BLOOD PRESSURE: 130 MMHG | HEIGHT: 69 IN | DIASTOLIC BLOOD PRESSURE: 78 MMHG | BODY MASS INDEX: 27.4 KG/M2

## 2020-05-21 DIAGNOSIS — R97.20 ELEVATED PROSTATE SPECIFIC ANTIGEN (PSA): Primary | ICD-10-CM

## 2020-05-21 DIAGNOSIS — C61 PROSTATE CANCER (H): ICD-10-CM

## 2020-05-21 LAB — PSA SERPL-MCNC: 6.3 NG/ML (ref 0–4)

## 2020-05-21 PROCEDURE — 76872 US TRANSRECTAL: CPT | Performed by: UROLOGY

## 2020-05-21 PROCEDURE — 36415 COLL VENOUS BLD VENIPUNCTURE: CPT | Performed by: UROLOGY

## 2020-05-21 PROCEDURE — 88305 TISSUE EXAM BY PATHOLOGIST: CPT | Mod: 26 | Performed by: UROLOGY

## 2020-05-21 PROCEDURE — 55700 ZZHC BIOPSY PROSTATE NEEDLE/PUNCH: CPT | Performed by: UROLOGY

## 2020-05-21 PROCEDURE — 88305 TISSUE EXAM BY PATHOLOGIST: CPT | Performed by: UROLOGY

## 2020-05-21 PROCEDURE — 96372 THER/PROPH/DIAG INJ SC/IM: CPT | Mod: 59 | Performed by: UROLOGY

## 2020-05-21 PROCEDURE — 84153 ASSAY OF PSA TOTAL: CPT | Performed by: UROLOGY

## 2020-05-21 ASSESSMENT — PAIN SCALES - GENERAL: PAINLEVEL: NO PAIN (0)

## 2020-05-21 ASSESSMENT — MIFFLIN-ST. JEOR: SCORE: 1579.53

## 2020-05-21 NOTE — PROGRESS NOTES
Agustin oNlan is here for a transrectal altrasound guided needle biopsy of the prostate for a significant risk of potentially lethal prostate cancer.    The risks, benefits, of the procudure were discussed.  All questions were answered.  A written informed consent was obtained.      PSA   Date Value Ref Range Status   03/15/2018 5.10 (H) 0 - 4 ug/L Final     Comment:     Assay Method:  Chemiluminescence using Siemens Vista analyzer   01/24/2017 4.45 (H) 0 - 4 ug/L Final     Comment:     Assay Method:  Chemiluminescence using Siemens Vista analyzer   01/21/2016 3.31 0 - 4 ug/L Final   09/03/2014 3.14 0 - 4 ug/L Final       An enema was completed and 500 mg of Cipro twice daily was started prior to the biopsy.  After obtaining informed consent patient was placed in lateral decubitus position.  The ultrasound probe was placed in the rectum.  The prostate was numbed using ultrasound guidance with 1% lidocaine 5 mls along each nerve bundle.      The volume was measured and estimated to be 38 cubic centimeters.      US images were used to guide the biopsies of the prostate.  12 cores were taken with 6 on each side, 2 at the base,  2 at the midgland and  2 at the apex.  The patient tolerated the procedure well.      We will follow up with the results in 7-10 days and contact patient with these results.

## 2020-05-21 NOTE — NURSING NOTE
Chief Complaint   Patient presents with     Elevated PSA     The patient is here today for Transrectal Ultrasound Prostate Biopsy         Procedure was explained to patient prior to performing said procedure. The patient signed the consent form and all questions were answered prior to the procedure. Any pre-procedural antibiotics were given according to the performing physicians recommendation. Pt's information was confirmed on samples and samples were sent for analysis. Paient reviewed information on labels sent with patient and confirmed the accuracy of all the labels.    Consent read and signed: Yes   No Known Allergies  Performing Physician: Dr. Henry  Antibiotic taken? Yes  Aspirin or other blood thinning medications discontinued 7-10 days:  YES  Time of Fleet's enema: 5;00AM  Patient given Gentamicin intramuscular injection 30 minutes prior to procedure Yes    12 samples were taken from the right and left, medial and lateral base, mid, and apex of the prostate respectively.  additional samples were taken from . Vitals were repeated prior to patient leaving and instructions for post TRUS care were explained to the patient.      The following medication was given:     MEDICATION:  GENTAMICIN  ROUTE: IM  SITE: LUQ - Gluteus  DOSE: 80MG  LOT #: [id number]  : [employer]  EXPIRATION DATE: 09/20  NDC#: 46817-036-16  Was there drug waste? No  Multi-dose vial: No    The following medication was given:     MEDICATION:  Lidocaine 2% Soln  ROUTE: RECTAL  SITE: PROSTATE  DOSE:20ML  LOT #: [id number]  : [employer]  EXPIRATION DATE: 09/21  NDC#: 75497-600-02  Was there drug waste? Yes  Amount of drug waste (mL): 10ML.  Reason for waste:  Single use vial  Multi-dose vial: No      KEYONNA Verdugo  May 21, 2020

## 2020-05-21 NOTE — LETTER
5/21/2020     RE: Agustin Nolan  [address] [facility] [address]     Dear Colleague,    Thank you for referring your patient, Agustin Nolan, to the [facility] [place] at [facility]. Please see a copy of my visit note below.    Agustin Nolan is here for a transrectal altrasound guided needle biopsy of the prostate for a significant risk of potentially lethal prostate cancer.    The risks, benefits, of the procudure were discussed.  All questions were answered.  A written informed consent was obtained.      PSA   Date Value Ref Range Status   03/15/2018 5.10 (H) 0 - 4 ug/L Final     Comment:     Assay Method:  Chemiluminescence using Siemens Vista analyzer   01/24/2017 4.45 (H) 0 - 4 ug/L Final     Comment:     Assay Method:  Chemiluminescence using Siemens Vista analyzer   01/21/2016 3.31 0 - 4 ug/L Final   09/03/2014 3.14 0 - 4 ug/L Final       An enema was completed and 500 mg of Cipro twice daily was started prior to the biopsy.  After obtaining informed consent patient was placed in lateral decubitus position.  The ultrasound probe was placed in the rectum.  The prostate was numbed using ultrasound guidance with 1% lidocaine 5 mls along each nerve bundle.      The volume was measured and estimated to be 38 cubic centimeters.      US images were used to guide the biopsies of the prostate.  12 cores were taken with 6 on each side, 2 at the base,  2 at the midgland and  2 at the apex.  The patient tolerated the procedure well.      We will follow up with the results in 7-10 days and contact patient with these results.      Again, thank you for allowing me to participate in the care of your patient.      Sincerely,    Kamlesh Henry MD

## 2020-05-21 NOTE — PATIENT INSTRUCTIONS
[facility]  Transrectal Ultrasound  Post Operative Information    The physician who performed your Transrectal Ultrasound is Dr. Henry  (telephone number [phone]).  Please contact this doctor if you have any problems or questions.  If unable to reach your doctor, please return to the Emergency Department.      Take one antibiotic the evening of the procedure and then as directed on your prescription.    Drink at least 6-8 glasses of fluids for the first 48 hours.    Avoid heavy lifting and strenuous activity for 48 hours.    Avoid sexual intercourse for the first 24 hours.    No aspirin or ibuprofen products (Motrin, Advil, Nuprin, ect.) for one week.  You may take acetaminophen (Tylenol) for pain.    You may notice a small amount of blood on the tissue after a bowel movement.    You may pass blood with clots in your urine following the procedure.  The amount will decrease with time but may be visible for up to two weeks.     You make have blood in your semen for 4 weeks after the procedure.    You may experience mild perineal (groin area) discomfort after the procedure.    Please call you doctor if you have any of the follow symptoms:  Fever  Increase in the amount of blood passed  Severe discomfort or pain

## 2020-05-22 LAB — COPATH REPORT: NORMAL

## 2020-06-04 ENCOUNTER — VIRTUAL VISIT (OUTPATIENT)
Dept: UROLOGY | Facility: CLINIC | Age: 75
End: 2020-06-04
Payer: MEDICARE

## 2020-06-04 VITALS — BODY MASS INDEX: 26.05 KG/M2 | WEIGHT: 182 LBS | HEIGHT: 70 IN

## 2020-06-04 DIAGNOSIS — C61 PROSTATE CANCER (H): Primary | ICD-10-CM

## 2020-06-04 PROBLEM — E11.9 DIABETES MELLITUS, TYPE 2 (H): Status: ACTIVE | Noted: 2020-06-04

## 2020-06-04 PROCEDURE — 99213 OFFICE O/P EST LOW 20 MIN: CPT | Mod: 95 | Performed by: UROLOGY

## 2020-06-04 ASSESSMENT — MIFFLIN-ST. JEOR: SCORE: 1573.86

## 2020-06-04 ASSESSMENT — PAIN SCALES - GENERAL: PAINLEVEL: MILD PAIN (2)

## 2020-06-04 NOTE — PROGRESS NOTES
"Agustin Nolan is a 74 year old male who is being evaluated via a billable video visit.      The patient has been notified of following:     \"This video visit will be conducted via a call between you and your physician/provider. We have found that certain health care needs can be provided without the need for an in-person physical exam.  This service lets us provide the care you need with a video conversation.  If a prescription is necessary we can send it directly to your pharmacy.  If lab work is needed we can place an order for that and you can then stop by our lab to have the test done at a later time.    Video visits are billed at different rates depending on your insurance coverage.  Please reach out to your insurance provider with any questions.    If during the course of the call the physician/provider feels a video visit is not appropriate, you will not be charged for this service.\"    Patient has given verbal consent for Video visit? Yes    How would you like to obtain your AVS? [facility]    Patient would like the video invitation sent by: Text to cell phone: [phone]    Will anyone else be joining your video visit? No      Office Visit Note  [facility]  [phone]    UROLOGIC DIAGNOSES:   Low risk prostate cancer    CURRENT INTERVENTIONS:   Active surveillance, biopsies x 2    HISTORY:   Agustin is set up for a virtual visit today to go over his recent prostate biopsy results and to discuss the state of his active surveillance for prostate cancer.  His recent prostate biopsy showed Amber 3+ 3 = 6 prostate cancer present in 3 out of 12 cores.  The positive cores were left mid, left apex, and right lateral.  All showed small amounts of cancer.  This nodule demonstrates a low volume cancer as compared to what was found in his 2018 biopsy.  He continues to have no urinary symptoms or complaints at this time.  He has felt well since his biopsy.      PAST MEDICAL HISTORY:   Past Medical " History:   Diagnosis Date     Esophageal reflux      Prediabetes        PAST SURGICAL HISTORY:   Past Surgical History:   Procedure Laterality Date     APPENDECTOMY       cystoscopy with pyeloscopy with removal of calculus.       PROSTATE SURGERY       ROTATOR CUFF REPAIR RT/LT       surgery testis exploration of undescended.  1985     TESTICLE SURGERY       VASECTOMY         FAMILY HISTORY: History reviewed. No pertinent family history.    SOCIAL HISTORY:   Social History     Tobacco Use     Smoking status: Never Smoker     Smokeless tobacco: Never Used   Substance Use Topics     Alcohol use: Yes       REVIEW OF SYSTEMS:  Skin: No rash, pruritis, or skin pigmentation  Eyes: No changes in vision  Ears/Nose/Throat: No changes in hearing, no nosebleeds  Respiratory: No shortness of breath, dyspnea on exertion, cough, or hemoptysis  Cardiovascular: No chest pain or palpitations  Gastrointestinal: No diarrhea or constipation. No abdominal pain. No hematochezia  Genitourinary: see HPI  Musculoskeletal: No pain or swelling of joints, normal range of motion  Neurologic: No weakness or tremors  Psychiatric: No recent changes in memory or mood  Hematologic/Lymphatic/Immunologic: No easy bruising or enlarged lymph nodes  Endocrine: No weight gain or loss      PHYSICAL EXAM:    General: Alert and oriented to time, place, and self. In NAD   HEENT: Head AT/NC, EOMI, CN Grossly intact   Lungs: no respiratory distress, or pursed lip breathing   Heart: No obvious jugular venous distension present   Musculoskeltal: Normal movements. Normal appearing musculature  Skin: no suspicious lesions or rashes   Neuro: Alert, oriented, speech and mentation normal; moving all 4 extremities equally.   Psych: affect and mood normal    Imaging: None    Urinalysis: UA RESULTS:  Recent Labs   Lab Test 04/26/18  1007   COLOR Yellow   APPEARANCE Clear   URINEGLC Negative   URINEBILI Negative   URINEKETONE Negative   SG 1.020   UBLD Negative   URINEPH  6.0   PROTEIN Negative   UROBILINOGEN 0.2   NITRITE Negative   LEUKEST Negative       PSA: 6.3    Post Void Residual:     Other labs: None today      IMPRESSION:  Low risk prostate cancer    PLAN:  He is low risk prostate cancer appears to be stable based on both his MRI and repeat biopsy findings.  We discussed the treatment options.  He wishes to continue with active surveillance.  I will plan to have him check another PSA in 6 months and then go over the results with me via virtual visit.      Kamlesh Henry M.D.              Video-Visit Details    Type of service:  Video Visit    Video Start Time: 10:02 AM  Video End Time: 10:15 AM    Originating Location (pt. Location): Home    Distant Location (provider location):  [facility] [place]     Platform used for Video Visit: CBRITE    Kamlesh Henry MD

## 2020-06-04 NOTE — NURSING NOTE
"Chief Complaint   Patient presents with     Elevated PSA     Patient has a elevated psa       Agustin Nolan is a 74 year old male who is being evaluated via a billable video visit.      The patient has been notified of following:     \"This video visit will be conducted via a call between you and Dr Henry.  If a prescription is necessary we can send it directly to your pharmacy.  If lab work is needed we can place an order for that and you can then stop by our lab to have the test done at a later time.    Patient has given verbal consent for Video visit? Yes      Current Outpatient Medications:      Cetirizine HCl (ZYRTEC ALLERGY PO), , Disp: , Rfl:      Cholecalciferol (VITAMIN D) 2000 UNITS tablet, Take 1 tablet by mouth daily, Disp: , Rfl:      GLUCOSAMINE CHONDROITIN COMPLX PO, Take by mouth 2 times daily, Disp: , Rfl:      omeprazole (PRILOSEC) 20 MG DR capsule, Take 1 capsule (20 mg) by mouth daily, Disp: 90 capsule, Rfl: 0     pravastatin (PRAVACHOL) 40 MG tablet, Take 1 tablet (40 mg) by mouth daily, Disp: 90 tablet, Rfl: 0     aspirin 81 MG tablet, Take 1 tablet by mouth daily, Disp: , Rfl:      FLUZONE HIGH-DOSE 0.5 ML injection, TO BE ADMINISTERED BY THE PHARMACIST., Disp: , Rfl: 0      Patient will discuss with Dr Henry his next follow wabout his elevated psa.      KEYONNA Verdugo    "

## 2020-06-04 NOTE — LETTER
"6/4/2020       RE: Agustin Nolan  [address] [facility] [address]     Dear Colleague,    Thank you for referring your patient, Agustin Nolan, to the [facility] [place] [facility] at [facility]. Please see a copy of my visit note below.    Agustin Nolan is a 74 year old male who is being evaluated via a billable video visit.      The patient has been notified of following:     \"This video visit will be conducted via a call between you and your physician/provider. We have found that certain health care needs can be provided without the need for an in-person physical exam.  This service lets us provide the care you need with a video conversation.  If a prescription is necessary we can send it directly to your pharmacy.  If lab work is needed we can place an order for that and you can then stop by our lab to have the test done at a later time.    Video visits are billed at different rates depending on your insurance coverage.  Please reach out to your insurance provider with any questions.    If during the course of the call the physician/provider feels a video visit is not appropriate, you will not be charged for this service.\"    Patient has given verbal consent for Video visit? Yes    How would you like to obtain your AVS? MyChart    Patient would like the video invitation sent by: Text to cell phone: [phone]    Will anyone else be joining your video visit? No      Office Visit Note  [facility]  [phone]    UROLOGIC DIAGNOSES:   Low risk prostate cancer    CURRENT INTERVENTIONS:   Active surveillance, biopsies x 2    HISTORY:   Agustin is set up for a virtual visit today to go over his recent prostate biopsy results and to discuss the state of his active surveillance for prostate cancer.  His recent prostate biopsy showed Amber 3+ 3 = 6 prostate cancer present in 3 out of 12 cores.  The positive cores were left mid, left apex, " and right lateral.  All showed small amounts of cancer.  This nodule demonstrates a low volume cancer as compared to what was found in his 2018 biopsy.  He continues to have no urinary symptoms or complaints at this time.  He has felt well since his biopsy.      PAST MEDICAL HISTORY:   Past Medical History:   Diagnosis Date     Esophageal reflux      Prediabetes        PAST SURGICAL HISTORY:   Past Surgical History:   Procedure Laterality Date     APPENDECTOMY       cystoscopy with pyeloscopy with removal of calculus.       PROSTATE SURGERY       ROTATOR CUFF REPAIR RT/LT       surgery testis exploration of undescended.  1985     TESTICLE SURGERY       VASECTOMY         FAMILY HISTORY: History reviewed. No pertinent family history.    SOCIAL HISTORY:   Social History     Tobacco Use     Smoking status: Never Smoker     Smokeless tobacco: Never Used   Substance Use Topics     Alcohol use: Yes       REVIEW OF SYSTEMS:  Skin: No rash, pruritis, or skin pigmentation  Eyes: No changes in vision  Ears/Nose/Throat: No changes in hearing, no nosebleeds  Respiratory: No shortness of breath, dyspnea on exertion, cough, or hemoptysis  Cardiovascular: No chest pain or palpitations  Gastrointestinal: No diarrhea or constipation. No abdominal pain. No hematochezia  Genitourinary: see HPI  Musculoskeletal: No pain or swelling of joints, normal range of motion  Neurologic: No weakness or tremors  Psychiatric: No recent changes in memory or mood  Hematologic/Lymphatic/Immunologic: No easy bruising or enlarged lymph nodes  Endocrine: No weight gain or loss      PHYSICAL EXAM:    General: Alert and oriented to time, place, and self. In NAD   HEENT: Head AT/NC, EOMI, CN Grossly intact   Lungs: no respiratory distress, or pursed lip breathing   Heart: No obvious jugular venous distension present   Musculoskeltal: Normal movements. Normal appearing musculature  Skin: no suspicious lesions or rashes   Neuro: Alert, oriented, speech and  mentation normal; moving all 4 extremities equally.   Psych: affect and mood normal    Imaging: None    Urinalysis: UA RESULTS:  Recent Labs   Lab Test 04/26/18  1007   COLOR Yellow   APPEARANCE Clear   URINEGLC Negative   URINEBILI Negative   URINEKETONE Negative   SG 1.020   UBLD Negative   URINEPH 6.0   PROTEIN Negative   UROBILINOGEN 0.2   NITRITE Negative   LEUKEST Negative       PSA: 6.3    Post Void Residual:     Other labs: None today      IMPRESSION:  Low risk prostate cancer    PLAN:  He is low risk prostate cancer appears to be stable based on both his MRI and repeat biopsy findings.  We discussed the treatment options.  He wishes to continue with active surveillance.  I will plan to have him check another PSA in 6 months and then go over the results with me via virtual visit.      Kamlesh Henry M.D.              Video-Visit Details    Type of service:  Video Visit    Video Start Time: 10:02 AM  Video End Time: 10:15 AM    Originating Location (pt. Location): Home    Distant Location (provider location):  [facility] [place]     Platform used for Video Visit: AprilGidsy    Kamlesh Henry MD          Again, thank you for allowing me to participate in the care of your patient.

## 2020-06-23 DIAGNOSIS — E11.9 TYPE 2 DIABETES MELLITUS WITHOUT COMPLICATION, WITHOUT LONG-TERM CURRENT USE OF INSULIN (H): Primary | ICD-10-CM

## 2020-06-29 ENCOUNTER — MYC REFILL (OUTPATIENT)
Dept: FAMILY MEDICINE | Facility: CLINIC | Age: 75
End: 2020-06-29

## 2020-06-29 DIAGNOSIS — E11.9 TYPE 2 DIABETES MELLITUS WITHOUT COMPLICATION, WITHOUT LONG-TERM CURRENT USE OF INSULIN (H): ICD-10-CM

## 2020-07-14 ENCOUNTER — OFFICE VISIT (OUTPATIENT)
Dept: FAMILY MEDICINE | Facility: CLINIC | Age: 75
End: 2020-07-14
Payer: MEDICARE

## 2020-07-14 DIAGNOSIS — R53.83 DECREASED ENERGY: ICD-10-CM

## 2020-07-14 DIAGNOSIS — Z12.12 SCREENING FOR COLORECTAL CANCER: ICD-10-CM

## 2020-07-14 DIAGNOSIS — E11.9 TYPE 2 DIABETES MELLITUS WITHOUT COMPLICATION, WITHOUT LONG-TERM CURRENT USE OF INSULIN (H): ICD-10-CM

## 2020-07-14 DIAGNOSIS — Z00.00 MEDICARE ANNUAL WELLNESS VISIT, SUBSEQUENT: Primary | ICD-10-CM

## 2020-07-14 DIAGNOSIS — K21.9 GASTROESOPHAGEAL REFLUX DISEASE, ESOPHAGITIS PRESENCE NOT SPECIFIED: ICD-10-CM

## 2020-07-14 DIAGNOSIS — E78.5 HYPERLIPIDEMIA LDL GOAL <100: ICD-10-CM

## 2020-07-14 DIAGNOSIS — Z12.11 SCREENING FOR COLORECTAL CANCER: ICD-10-CM

## 2020-07-14 LAB
BUN SERPL-MCNC: 13 MG/DL (ref 7–30)
CALCIUM SERPL-MCNC: 9.6 MG/DL (ref 8.5–10.4)
CHLORIDE SERPLBLD-SCNC: 103 MMOL/L (ref 94–109)
CHOLEST SERPL-MCNC: 176 MG/DL (ref 0–200)
CHOLEST/HDLC SERPL: 3.6 {RATIO} (ref 0–5)
CO2 SERPL-SCNC: 26 MMOL/L (ref 20–32)
CREAT SERPL-MCNC: 1.1 MG/DL (ref 0.8–1.5)
CREAT UR-MCNC: 84 MG/DL
EGFR CALCULATED (BLACK REFERENCE): 84.6
EGFR CALCULATED (NON BLACK REFERENCE): 69.9
FASTING SPECIMEN: YES
GLUCOSE SERPL-MCNC: 116 MG/DL (ref 60–99)
HBA1C MFR BLD: 6.9 % (ref 4.1–5.7)
HDLC SERPL-MCNC: 48 MG/DL
LDLC SERPL CALC-MCNC: 99 MG/DL (ref 0–129)
MICROALBUMIN UR-MCNC: 27 MG/L
MICROALBUMIN/CREAT UR: 32.5 MG/G CR (ref 0–17)
POTASSIUM SERPL-SCNC: 3.9 MMOL/L (ref 3.4–5.3)
SODIUM SERPL-SCNC: 142 MMOL/L (ref 137.3–146.3)
T4 FREE SERPL-MCNC: 0.93 NG/DL (ref 0.76–1.46)
TRIGL SERPL-MCNC: 143 MG/DL (ref 0–150)
TSH SERPL DL<=0.005 MIU/L-ACNC: 5.74 MU/L (ref 0.4–4)
VLDL-CHOLESTEROL: 29 (ref 7–32)

## 2020-07-14 ASSESSMENT — MIFFLIN-ST. JEOR: SCORE: 1622.16

## 2020-07-14 NOTE — NURSING NOTE
"74 year old  Chief Complaint   Patient presents with     Physical     no concerns       Blood pressure (!) 141/90, pulse 85, temperature 97.9  F (36.6  C), temperature source Temporal, resp. rate 15, height 1.77 m (5' 9.69\"), weight 87.1 kg (192 lb), SpO2 96 %. Body mass index is 27.8 kg/m .  Patient Active Problem List   Diagnosis     Hyperlipidemia LDL goal <130     Prediabetes     Medicare annual wellness visit, subsequent     Idiopathic peripheral neuropathy     Wears hearing aid     Diabetes mellitus, type 2 (H)       Wt Readings from Last 2 Encounters:   07/14/20 87.1 kg (192 lb)   06/04/20 82.6 kg (182 lb)     BP Readings from Last 3 Encounters:   07/14/20 (!) 141/90   05/21/20 130/78   11/21/19 128/88         Current Outpatient Medications   Medication     blood glucose (ACCU-CHEK JHON) test strip     Cetirizine HCl (ZYRTEC ALLERGY PO)     Cholecalciferol (VITAMIN D) 2000 UNITS tablet     GLUCOSAMINE CHONDROITIN COMPLX PO     omeprazole (PRILOSEC) 20 MG DR capsule     pravastatin (PRAVACHOL) 40 MG tablet     aspirin 81 MG tablet     FLUZONE HIGH-DOSE 0.5 ML injection     No current facility-administered medications for this visit.        Social History     Tobacco Use     Smoking status: Never Smoker     Smokeless tobacco: Never Used   Substance Use Topics     Alcohol use: Yes     Drug use: No       Health Maintenance Due   Topic Date Due     DIABETIC FOOT EXAM  1945     ADVANCE CARE PLANNING  1945     ZOSTER IMMUNIZATION (2 of 3) 03/24/2014     MICROALBUMIN  07/14/2018     EYE EXAM  10/17/2018     A1C  09/19/2019     MEDICARE ANNUAL WELLNESS VISIT  03/19/2020     BMP  03/19/2020     LIPID  03/19/2020     FALL RISK ASSESSMENT  03/19/2020       No results found for: PAP      July 14, 2020 2:21 PM    "

## 2020-07-22 DIAGNOSIS — K21.9 GASTROESOPHAGEAL REFLUX DISEASE, ESOPHAGITIS PRESENCE NOT SPECIFIED: ICD-10-CM

## 2020-07-22 DIAGNOSIS — E78.5 HYPERLIPIDEMIA LDL GOAL <100: ICD-10-CM

## 2020-07-22 RX ORDER — PRAVASTATIN SODIUM 40 MG
40 TABLET ORAL DAILY
Qty: 90 TABLET | Refills: 3 | Status: SHIPPED | OUTPATIENT
Start: 2020-07-22 | End: 2021-08-09

## 2020-07-22 NOTE — TELEPHONE ENCOUNTER
Omeprazole : Last time prescribed: 04/29/2020 , 90 caps x 0 refills  Pravastatin : Last time prescribed: 04/29/2020 , 90 tabs x 0 refills    Last office visit: 07/14/2020  Next appointment: No future appointments    Prescription approved per [facility] Refill Protocol.  Jenna Scanlon RN  [facility]

## 2020-07-31 VITALS
OXYGEN SATURATION: 96 % | HEIGHT: 70 IN | HEART RATE: 81 BPM | RESPIRATION RATE: 15 BRPM | BODY MASS INDEX: 27.49 KG/M2 | TEMPERATURE: 97.9 F | DIASTOLIC BLOOD PRESSURE: 81 MMHG | SYSTOLIC BLOOD PRESSURE: 119 MMHG | WEIGHT: 192 LBS

## 2020-07-31 NOTE — PROGRESS NOTES
"CHIEF COMPLAINT:  Medicare annual wellness visit, subsequent.      HISTORY OF PRESENT ILLNESS:  Agustin is a 74-year-old gentleman who came in today with his mom (who is 97 years old) for her wellness visit as well.      Overall, Agustin is doing well.  He is very frustrated with the pandemic and does not think it is \"real.\"  He reluctantly was wearing a mask, but it was around his chin most of the time.      Overall, he is doing well.  He finds his general stamina is a little bit decreased.  He has been diagnosed with prediabetes, had originally been diagnosed with prediabetes but frankly actually has type 2 diabetes.  We talked about that.  His A1c has typically been less than 8%.  Blood pressure at home (he brought in a number of readings) averages 119/81.  Last LDL was 112.  He is a nonsmoker.  It has been recommended that he take aspirin 81 mg daily, but it is not clear that he is doing that.  I stressed the importance of doing so.      MEDICARE QUESTIONS:  No problems with activities of daily living.  He is very active.  He rides a motorcycle.  No problems caring for his home or for himself.  He has had no falls at home in the last year.  No symptoms of depression, although he has had a great deal of frustration and stress with the current political situation.  He has no memory loss or memory concerns.      HEALTHCARE MAINTENANCE:  He wears glasses.  Eye care is up-to-date.  He purchased hearing aids and is not happy about them.  Dental care is up-to-date.  Immunizations up-to-date.  Tetanus shot due in 2022.  He has had both pneumonia shots.  He is questioning about getting a flu shot, and I would recommend that he do so early this season.  Blood pressure averaging 119/81.  Body mass index is 27.8, and his weight is up about 10 pounds since June.  From a lab standpoint, we will be checking a lipid panel, metabolic panel, A1c, TSH with cascade and a urine microalbumin.  For colon cancer screening purposes, we are " going to do a FIT card.  PSA is up-to-date.      OBJECTIVE:  Agustin is in no distress.  He is alert and oriented x3.  Partially wearing a mask.  Blood pressure averaging 119/81.  Pulse 85.  Temperature 97.1.  He is 5 feet 9.7 inches in height and weighs 192 pounds with an O2 sat of 96%.  BMI 27.8.   HEENT:  Head is normocephalic, atraumatic.  Ears are free of any significant cerumen.  The TMs look fine.  There is no pain with palpation of the frontal or maxillary sinuses.  Eyes are grossly normal.   NECK:  Supple without any anterior or posterior chain adenopathy.  No visible or palpable thyromegaly.  No carotid bruits.   BACK:  Smooth and straight.   LUNGS:  Clear to auscultation bilaterally.   HEART:  Regular rate and rhythm without murmur.   EXTREMITIES:  Ankles are free of any edema.      ASSESSMENT AND PLAN:   1.  Medicare annual wellness visit, up-to-date with most healthcare maintenance strategies.   2.  Flu shot recommended early fall.   3.  Type 2 diabetes without complication, without the long-term current use of insulin.  Labs are pending today.  This will help us determine where he is in terms of care.   4.  Hyperlipidemia with a goal of an LDL less than 100.  Currently on pravastatin 40 mg daily.  If his LDL is not better, we can switch over to simvastatin instead.   5.  Decreased energy.  We will see what his TSH shows.   6.  Screening for colon cancer with a FIT card.   7.  History of GE reflux, on omeprazole.  Refill sent in.   8.  Call with any problems or questions.  I look forward to seeing him back in approximately 6 months because of his diabetes.

## 2020-09-25 ENCOUNTER — MYC REFILL (OUTPATIENT)
Dept: FAMILY MEDICINE | Facility: CLINIC | Age: 75
End: 2020-09-25

## 2020-09-25 DIAGNOSIS — E11.9 TYPE 2 DIABETES MELLITUS WITHOUT COMPLICATION, WITHOUT LONG-TERM CURRENT USE OF INSULIN (H): ICD-10-CM

## 2020-09-25 NOTE — TELEPHONE ENCOUNTER
Last Office Visit: 7/14/20  Future [place] Appointments: none  Medication last refilled: 6/30/20 #100    Prescription approved per [facility] Refill Protocol.  Wendy Byrd RN  09/25/20  4:52 PM

## 2020-10-23 DIAGNOSIS — E11.9 TYPE 2 DIABETES MELLITUS WITHOUT COMPLICATION, WITHOUT LONG-TERM CURRENT USE OF INSULIN (H): ICD-10-CM

## 2020-10-23 NOTE — PROGRESS NOTES
A user error has taken place: encounter opened in error, closed for administrative reasons.  Wendy Byrd RN  10/23/20  4:29 PM

## 2020-11-20 ENCOUNTER — MYC MEDICAL ADVICE (OUTPATIENT)
Dept: FAMILY MEDICINE | Facility: CLINIC | Age: 75
End: 2020-11-20

## 2020-11-22 ENCOUNTER — HEALTH MAINTENANCE LETTER (OUTPATIENT)
Age: 75
End: 2020-11-22

## 2021-02-08 DIAGNOSIS — C61 PROSTATE CANCER (H): Primary | ICD-10-CM

## 2021-02-09 ENCOUNTER — OFFICE VISIT (OUTPATIENT)
Dept: UROLOGY | Facility: CLINIC | Age: 76
End: 2021-02-09
Payer: MEDICARE

## 2021-02-09 VITALS
HEIGHT: 70 IN | DIASTOLIC BLOOD PRESSURE: 82 MMHG | SYSTOLIC BLOOD PRESSURE: 122 MMHG | WEIGHT: 178 LBS | BODY MASS INDEX: 25.48 KG/M2

## 2021-02-09 DIAGNOSIS — C61 PROSTATE CANCER (H): ICD-10-CM

## 2021-02-09 LAB — PSA SERPL-MCNC: 5.8 NG/ML (ref 0–4)

## 2021-02-09 PROCEDURE — 99213 OFFICE O/P EST LOW 20 MIN: CPT | Performed by: UROLOGY

## 2021-02-09 PROCEDURE — 84153 ASSAY OF PSA TOTAL: CPT | Performed by: UROLOGY

## 2021-02-09 ASSESSMENT — PAIN SCALES - GENERAL: PAINLEVEL: NO PAIN (0)

## 2021-02-09 ASSESSMENT — MIFFLIN-ST. JEOR: SCORE: 1558.65

## 2021-02-09 NOTE — PROGRESS NOTES
Office Visit Note  [facility]  [phone]    UROLOGIC DIAGNOSES:   Low risk prostate cancer    CURRENT INTERVENTIONS:   Active surveillance.  Prostate biopsies x2.    HISTORY:   Agustin returns to clinic today for continued active surveillance of his low-grade prostate cancer.  His PSA today is down slightly at 5.8.  He continues to have no urinary symptoms or complaints.      PAST MEDICAL HISTORY:   Past Medical History:   Diagnosis Date     Esophageal reflux      Prediabetes        PAST SURGICAL HISTORY:   Past Surgical History:   Procedure Laterality Date     APPENDECTOMY       cystoscopy with pyeloscopy with removal of calculus.       PROSTATE SURGERY       ROTATOR CUFF REPAIR RT/LT       surgery testis exploration of undescended.  1985     TESTICLE SURGERY       VASECTOMY         FAMILY HISTORY: No family history on file.    SOCIAL HISTORY:   Social History     Socioeconomic History     Marital status:      Spouse name: None     Number of children: None     Years of education: None     Highest education level: None   Occupational History     None   Social Needs     Financial resource strain: None     Food insecurity     Worry: None     Inability: None     Transportation needs     Medical: None     Non-medical: None   Tobacco Use     Smoking status: Never Smoker     Smokeless tobacco: Never Used   Substance and Sexual Activity     Alcohol use: Yes     Drug use: No     Sexual activity: Never   Lifestyle     Physical activity     Days per week: None     Minutes per session: None     Stress: None   Relationships     Social connections     Talks on phone: None     Gets together: None     Attends Christianity service: None     Active member of club or organization: None     Attends meetings of clubs or organizations: None     Relationship status: None     Intimate partner violence     Fear of current or ex partner: None     Emotionally abused: None     Physically abused: None     Forced sexual  "activity: None   Other Topics Concern     Parent/sibling w/ CABG, MI or angioplasty before 65F 55M? Not Asked   Social History Narrative     None       Review Of Systems:  Skin: No rash, pruritis, or skin pigmentation  Eyes: No changes in vision  Ears/Nose/Throat: No changes in hearing, no nosebleeds  Respiratory: No shortness of breath, dyspnea on exertion, cough, or hemoptysis  Cardiovascular: No chest pain or palpitations  Gastrointestinal: No diarrhea or constipation. No abdominal pain. No hematochezia  Genitourinary: see HPI  Musculoskeletal: No pain or swelling of joints, normal range of motion  Neurologic: No weakness or tremors  Psychiatric: No recent changes in memory or mood  Hematologic/Lymphatic/Immunologic: No easy bruising or enlarged lymph nodes  Endocrine: No weight gain or loss      PHYSICAL EXAM:    /82 (BP Location: Left arm)   Ht 1.778 m (5' 10\")   Wt 80.7 kg (178 lb)   BMI 25.54 kg/m      Constitutional: Well developed. Conversant and in no acute distress  Eyes: Anicteric sclera, conjunctiva clear, normal extraocular movements  ENT: Normocephalic and atraumatic,   Skin: Warm and dry. No rashes or lesions  Cardiac: No peripheral edema  Back/Flank: Not done  CNS/PNS: Normal musculature and movements, moves all extremities normally  Respiratory: Normal non-labored breathing  Abdomen: Soft nontender and nondistended  Peripheral Vascular: No peripheral edema  Mental Status/Psych: Alert and Oriented x 3. Normal mood and affect    Penis: Not done  Scrotal Skin: Not done  Testicles: Not done  Epididymis: Not done  Digital Rectal Exam: The prostate is slightly enlarged, benign and symmetric to palpation    Cystoscopy: Not done    Imaging: None    Urinalysis: UA RESULTS:  Recent Labs   Lab Test 04/26/18  1007   COLOR Yellow   APPEARANCE Clear   URINEGLC Negative   URINEBILI Negative   URINEKETONE Negative   SG 1.020   UBLD Negative   URINEPH 6.0   PROTEIN Negative   UROBILINOGEN 0.2   NITRITE " Negative   LEUKEST Negative       PSA: 5.8    Post Void Residual:     Other labs: None today      IMPRESSION:  Low risk prostate cancer    PLAN:  He continues to do well on active surveillance for prostate cancer.  He wishes to continue active surveillance.  I will see him back in 6 months for another PSA and exam.      Kamlesh Henry M.D.

## 2021-02-09 NOTE — LETTER
2/9/2021       RE: Agustin Nolan  [address] [facility] [address]     Dear Colleague,    Thank you for referring your patient, Agustin Nolan, to the [facility] [place] at [facility]. Please see a copy of my visit note below.    Office Visit Note  [facility]  [phone]    UROLOGIC DIAGNOSES:   Low risk prostate cancer    CURRENT INTERVENTIONS:   Active surveillance.  Prostate biopsies x2.    HISTORY:   Agustin returns to clinic today for continued active surveillance of his low-grade prostate cancer.  His PSA today is down slightly at 5.8.  He continues to have no urinary symptoms or complaints.      PAST MEDICAL HISTORY:   Past Medical History:   Diagnosis Date     Esophageal reflux      Prediabetes        PAST SURGICAL HISTORY:   Past Surgical History:   Procedure Laterality Date     APPENDECTOMY       cystoscopy with pyeloscopy with removal of calculus.       PROSTATE SURGERY       ROTATOR CUFF REPAIR RT/LT       surgery testis exploration of undescended.  1985     TESTICLE SURGERY       VASECTOMY         FAMILY HISTORY: No family history on file.    SOCIAL HISTORY:   Social History     Socioeconomic History     Marital status:      Spouse name: None     Number of children: None     Years of education: None     Highest education level: None   Occupational History     None   Social Needs     Financial resource strain: None     Food insecurity     Worry: None     Inability: None     Transportation needs     Medical: None     Non-medical: None   Tobacco Use     Smoking status: Never Smoker     Smokeless tobacco: Never Used   Substance and Sexual Activity     Alcohol use: Yes     Drug use: No     Sexual activity: Never   Lifestyle     Physical activity     Days per week: None     Minutes per session: None     Stress: None   Relationships     Social connections     Talks on phone: None     Gets together: None     Attends  "Orthodox service: None     Active member of club or organization: None     Attends meetings of clubs or organizations: None     Relationship status: None     Intimate partner violence     Fear of current or ex partner: None     Emotionally abused: None     Physically abused: None     Forced sexual activity: None   Other Topics Concern     Parent/sibling w/ CABG, MI or angioplasty before 65F 55M? Not Asked   Social History Narrative     None       Review Of Systems:  Skin: No rash, pruritis, or skin pigmentation  Eyes: No changes in vision  Ears/Nose/Throat: No changes in hearing, no nosebleeds  Respiratory: No shortness of breath, dyspnea on exertion, cough, or hemoptysis  Cardiovascular: No chest pain or palpitations  Gastrointestinal: No diarrhea or constipation. No abdominal pain. No hematochezia  Genitourinary: see HPI  Musculoskeletal: No pain or swelling of joints, normal range of motion  Neurologic: No weakness or tremors  Psychiatric: No recent changes in memory or mood  Hematologic/Lymphatic/Immunologic: No easy bruising or enlarged lymph nodes  Endocrine: No weight gain or loss      PHYSICAL EXAM:    /82 (BP Location: Left arm)   Ht 1.778 m (5' 10\")   Wt 80.7 kg (178 lb)   BMI 25.54 kg/m      Constitutional: Well developed. Conversant and in no acute distress  Eyes: Anicteric sclera, conjunctiva clear, normal extraocular movements  ENT: Normocephalic and atraumatic,   Skin: Warm and dry. No rashes or lesions  Cardiac: No peripheral edema  Back/Flank: Not done  CNS/PNS: Normal musculature and movements, moves all extremities normally  Respiratory: Normal non-labored breathing  Abdomen: Soft nontender and nondistended  Peripheral Vascular: No peripheral edema  Mental Status/Psych: Alert and Oriented x 3. Normal mood and affect    Penis: Not done  Scrotal Skin: Not done  Testicles: Not done  Epididymis: Not done  Digital Rectal Exam: The prostate is slightly enlarged, benign and symmetric to " palpation    Cystoscopy: Not done    Imaging: None    Urinalysis: UA RESULTS:  Recent Labs   Lab Test 04/26/18  1007   COLOR Yellow   APPEARANCE Clear   URINEGLC Negative   URINEBILI Negative   URINEKETONE Negative   SG 1.020   UBLD Negative   URINEPH 6.0   PROTEIN Negative   UROBILINOGEN 0.2   NITRITE Negative   LEUKEST Negative       PSA: 5.8    Post Void Residual:     Other labs: None today      IMPRESSION:  Low risk prostate cancer    PLAN:  He continues to do well on active surveillance for prostate cancer.  He wishes to continue active surveillance.  I will see him back in 6 months for another PSA and exam.      Kamlesh Henry M.D.

## 2021-02-13 ENCOUNTER — HEALTH MAINTENANCE LETTER (OUTPATIENT)
Age: 76
End: 2021-02-13

## 2021-04-02 ENCOUNTER — MYC MEDICAL ADVICE (OUTPATIENT)
Dept: FAMILY MEDICINE | Facility: CLINIC | Age: 76
End: 2021-04-02

## 2021-04-02 DIAGNOSIS — E11.9 DIABETES MELLITUS, TYPE 2 (H): Primary | ICD-10-CM

## 2021-04-02 RX ORDER — BLOOD-GLUCOSE METER
1 EACH MISCELLANEOUS ONCE
Qty: 1 KIT | Refills: 0 | Status: SHIPPED | OUTPATIENT
Start: 2021-04-02 | End: 2021-04-02

## 2021-04-02 RX ORDER — BLOOD SUGAR DIAGNOSTIC
1 STRIP MISCELLANEOUS DAILY
Qty: 100 STRIP | Refills: 3 | Status: SHIPPED | OUTPATIENT
Start: 2021-04-02 | End: 2022-04-13

## 2021-04-16 ENCOUNTER — OFFICE VISIT (OUTPATIENT)
Dept: FAMILY MEDICINE | Facility: CLINIC | Age: 76
End: 2021-04-16
Payer: MEDICARE

## 2021-04-16 VITALS
SYSTOLIC BLOOD PRESSURE: 125 MMHG | HEART RATE: 62 BPM | DIASTOLIC BLOOD PRESSURE: 82 MMHG | HEIGHT: 70 IN | WEIGHT: 194.5 LBS | BODY MASS INDEX: 27.84 KG/M2 | TEMPERATURE: 97.1 F | RESPIRATION RATE: 15 BRPM | OXYGEN SATURATION: 95 %

## 2021-04-16 DIAGNOSIS — E11.9 TYPE 2 DIABETES MELLITUS WITHOUT COMPLICATION, WITHOUT LONG-TERM CURRENT USE OF INSULIN (H): ICD-10-CM

## 2021-04-16 DIAGNOSIS — Z00.00 MEDICARE ANNUAL WELLNESS VISIT, SUBSEQUENT: Primary | ICD-10-CM

## 2021-04-16 DIAGNOSIS — G60.9 IDIOPATHIC PERIPHERAL NEUROPATHY: ICD-10-CM

## 2021-04-16 DIAGNOSIS — Z97.4 WEARS HEARING AID: ICD-10-CM

## 2021-04-16 DIAGNOSIS — Z13.220 SCREENING FOR LIPID DISORDERS: ICD-10-CM

## 2021-04-16 DIAGNOSIS — Z12.5 SCREENING FOR PROSTATE CANCER: ICD-10-CM

## 2021-04-16 DIAGNOSIS — Z12.11 SCREENING FOR COLON CANCER: ICD-10-CM

## 2021-04-16 LAB
ANION GAP SERPL CALCULATED.3IONS-SCNC: 5 MMOL/L (ref 3–14)
BUN SERPL-MCNC: 17 MG/DL (ref 7–30)
CALCIUM SERPL-MCNC: 9.4 MG/DL (ref 8.5–10.1)
CHLORIDE SERPL-SCNC: 103 MMOL/L (ref 94–109)
CHOLEST SERPL-MCNC: 182 MG/DL
CO2 SERPL-SCNC: 30 MMOL/L (ref 20–32)
CREAT SERPL-MCNC: 1.13 MG/DL (ref 0.66–1.25)
GFR SERPL CREATININE-BSD FRML MDRD: 64 ML/MIN/{1.73_M2}
GLUCOSE SERPL-MCNC: 166 MG/DL (ref 70–99)
HBA1C MFR BLD: 7.8 % (ref 4.1–5.7)
HDLC SERPL-MCNC: 42 MG/DL
LDLC SERPL CALC-MCNC: 116 MG/DL
NONHDLC SERPL-MCNC: 140 MG/DL
POTASSIUM SERPL-SCNC: 4.6 MMOL/L (ref 3.4–5.3)
SODIUM SERPL-SCNC: 137 MMOL/L (ref 133–144)
T4 FREE SERPL-MCNC: 0.92 NG/DL (ref 0.76–1.46)
TRIGL SERPL-MCNC: 119 MG/DL
TSH SERPL DL<=0.005 MIU/L-ACNC: 4.59 MU/L (ref 0.4–4)

## 2021-04-16 RX ORDER — METFORMIN HCL 500 MG
TABLET, EXTENDED RELEASE 24 HR ORAL
Qty: 90 TABLET | Refills: 4 | Status: SHIPPED | OUTPATIENT
Start: 2021-04-16 | End: 2021-08-10

## 2021-04-16 SDOH — HEALTH STABILITY: MENTAL HEALTH: HOW OFTEN DO YOU HAVE 6 OR MORE DRINKS ON ONE OCCASION?: NOT ASKED

## 2021-04-16 SDOH — HEALTH STABILITY: MENTAL HEALTH: HOW OFTEN DO YOU HAVE A DRINK CONTAINING ALCOHOL?: 2-3 TIMES A WEEK

## 2021-04-16 SDOH — HEALTH STABILITY: MENTAL HEALTH: HOW MANY STANDARD DRINKS CONTAINING ALCOHOL DO YOU HAVE ON A TYPICAL DAY?: 1 OR 2

## 2021-04-16 ASSESSMENT — MIFFLIN-ST. JEOR: SCORE: 1629.53

## 2021-04-16 NOTE — NURSING NOTE
"75 year old  Chief Complaint   Patient presents with     Physical     no other concerns        Blood pressure 134/83, pulse 62, temperature 97.1  F (36.2  C), temperature source Skin, resp. rate 15, height 1.772 m (5' 9.75\"), weight 88.2 kg (194 lb 8 oz), SpO2 95 %. Body mass index is 28.11 kg/m .  Patient Active Problem List   Diagnosis     Hyperlipidemia LDL goal <130     Prediabetes     Medicare annual wellness visit, subsequent     Idiopathic peripheral neuropathy     Wears hearing aid     Diabetes mellitus, type 2 (H)       Wt Readings from Last 2 Encounters:   04/16/21 88.2 kg (194 lb 8 oz)   02/09/21 80.7 kg (178 lb)     BP Readings from Last 3 Encounters:   04/16/21 134/83   02/09/21 122/82   07/14/20 119/81         Current Outpatient Medications   Medication     ACCU-CHEK GUIDE test strip     blood glucose (ACCU-CHEK JHON) test strip     Cetirizine HCl (ZYRTEC ALLERGY PO)     Cholecalciferol (VITAMIN D) 2000 UNITS tablet     GLUCOSAMINE CHONDROITIN COMPLX PO     omeprazole (PRILOSEC) 20 MG DR capsule     pravastatin (PRAVACHOL) 40 MG tablet     FLUZONE HIGH-DOSE 0.5 ML injection     No current facility-administered medications for this visit.        Social History     Tobacco Use     Smoking status: Never Smoker     Smokeless tobacco: Never Used   Substance Use Topics     Alcohol use: Yes     Frequency: 2-3 times a week     Drinks per session: 1 or 2     Drug use: No       Health Maintenance Due   Topic Date Due     DIABETIC FOOT EXAM  Never done     ADVANCE CARE PLANNING  Never done     COVID-19 Vaccine (1) Never done     ZOSTER IMMUNIZATION (2 of 3) 03/24/2014     EYE EXAM  10/17/2018     COLORECTAL CANCER SCREENING  10/28/2020     A1C  01/14/2021       No results found for: PAP      April 16, 2021 8:12 AM    "

## 2021-04-18 PROBLEM — R73.03 PREDIABETES: Status: RESOLVED | Noted: 2017-08-06 | Resolved: 2021-04-18

## 2021-04-22 LAB — COLOGUARD-ABSTRACT: NEGATIVE

## 2021-05-25 ENCOUNTER — RECORDS - HEALTHEAST (OUTPATIENT)
Dept: ADMINISTRATIVE | Facility: CLINIC | Age: 76
End: 2021-05-25

## 2021-05-31 ENCOUNTER — RECORDS - HEALTHEAST (OUTPATIENT)
Dept: ADMINISTRATIVE | Facility: CLINIC | Age: 76
End: 2021-05-31

## 2021-06-07 DIAGNOSIS — E11.9 TYPE 2 DIABETES MELLITUS WITHOUT COMPLICATION, WITHOUT LONG-TERM CURRENT USE OF INSULIN (H): Primary | ICD-10-CM

## 2021-06-07 RX ORDER — BLOOD-GLUCOSE METER
EACH MISCELLANEOUS
Qty: 1 KIT | Refills: 0 | Status: SHIPPED | OUTPATIENT
Start: 2021-06-07

## 2021-06-07 NOTE — TELEPHONE ENCOUNTER
Last Office Visit: 4/16/21  [facility] [place] none  Medication last refilled: unknown - glucose meter    Wendy Byrd RN  06/07/21  10:08 AM

## 2021-07-23 ENCOUNTER — LAB (OUTPATIENT)
Dept: LAB | Facility: CLINIC | Age: 76
End: 2021-07-23
Payer: MEDICARE

## 2021-07-23 DIAGNOSIS — E11.9 DIABETES MELLITUS, TYPE 2 (H): Primary | ICD-10-CM

## 2021-07-23 LAB — HBA1C MFR BLD: 7.4 % (ref 0–5.6)

## 2021-08-09 DIAGNOSIS — E78.5 HYPERLIPIDEMIA LDL GOAL <100: ICD-10-CM

## 2021-08-09 RX ORDER — PRAVASTATIN SODIUM 40 MG
40 TABLET ORAL DAILY
Qty: 90 TABLET | Refills: 2 | Status: SHIPPED | OUTPATIENT
Start: 2021-08-09 | End: 2022-04-13

## 2021-08-09 NOTE — TELEPHONE ENCOUNTER
Medication requested: pravastatin (PRAVACHOL) 40 MG tablet  Last office visit: 4/16/21 - Medicare annual exam  [facility] appointments: none  Medication last refilled: 7/22/20 #90 + 3 refills  Last qualifying labs: 4/16/21

## 2021-08-09 NOTE — TELEPHONE ENCOUNTER
Last Office Visit: 4/16/21  Future [facility] Appointments: None  Medication last refilled: 7/22/20 #90 with 3 refill(s)     Ref Range & Units  4/16/21   LDL Cholesterol Calculated <100 mg/dL 116High        Ref Range & Units  4/16/21   Creatinine 0.66 - 1.25 mg/dL 1.13      Vital Signs 7/14/2020 2/9/2021 4/16/2021   Systolic 119 122 125   Diastolic 81 82 82     Prescription approved per [facility] Refill Protocol.    CuPcAkE & other things you baket message sent to patient requesting he schedule an annual exam at his convenience.    Nereida Ohara, RN, BSN

## 2021-08-10 ENCOUNTER — OFFICE VISIT (OUTPATIENT)
Dept: UROLOGY | Facility: CLINIC | Age: 76
End: 2021-08-10
Payer: MEDICARE

## 2021-08-10 VITALS
HEIGHT: 69 IN | HEART RATE: 69 BPM | DIASTOLIC BLOOD PRESSURE: 80 MMHG | WEIGHT: 180 LBS | SYSTOLIC BLOOD PRESSURE: 130 MMHG | BODY MASS INDEX: 26.66 KG/M2 | OXYGEN SATURATION: 96 %

## 2021-08-10 DIAGNOSIS — C61 PROSTATE CANCER (H): Primary | ICD-10-CM

## 2021-08-10 DIAGNOSIS — E11.9 TYPE 2 DIABETES MELLITUS WITHOUT COMPLICATION, WITHOUT LONG-TERM CURRENT USE OF INSULIN (H): ICD-10-CM

## 2021-08-10 LAB — PSA SERPL-MCNC: 6.2 UG/L (ref 0–4)

## 2021-08-10 PROCEDURE — 36415 COLL VENOUS BLD VENIPUNCTURE: CPT | Performed by: UROLOGY

## 2021-08-10 PROCEDURE — 84153 ASSAY OF PSA TOTAL: CPT | Performed by: UROLOGY

## 2021-08-10 PROCEDURE — 99213 OFFICE O/P EST LOW 20 MIN: CPT | Performed by: UROLOGY

## 2021-08-10 RX ORDER — METFORMIN HCL 500 MG
TABLET, EXTENDED RELEASE 24 HR ORAL
Qty: 180 TABLET | Refills: 2 | Status: SHIPPED | OUTPATIENT
Start: 2021-08-10 | End: 2022-04-13

## 2021-08-10 ASSESSMENT — PAIN SCALES - GENERAL: PAINLEVEL: NO PAIN (0)

## 2021-08-10 ASSESSMENT — MIFFLIN-ST. JEOR: SCORE: 1546.85

## 2021-08-10 NOTE — TELEPHONE ENCOUNTER
Changing metformin XR dosing to 500 mg bid per Dr Sada Schroeder message.   Jenna Scanlon RN  [facility]

## 2021-08-10 NOTE — LETTER
8/10/2021       RE: Agustin Nolan  [address] [place] [address]     Dear Colleague,    Thank you for referring your patient, Agustin Nolan, to the [facility] at [facility]. Please see a copy of my visit note below.    Office Visit Note  [facility]  [phone]    UROLOGIC DIAGNOSES:   Low risk prostate cancer    CURRENT INTERVENTIONS:   Active surveillance, prostate biopsies x2    HISTORY:   Agustin returns to clinic today for prostate cancer follow-up.  His PSA has risen today, now at 6.2.  He continues to have no urinary symptoms or complaints.      PAST MEDICAL HISTORY:   Past Medical History:   Diagnosis Date     Esophageal reflux        PAST SURGICAL HISTORY:   Past Surgical History:   Procedure Laterality Date     APPENDECTOMY       cystoscopy with pyeloscopy with removal of calculus.       PROSTATE SURGERY       ROTATOR CUFF REPAIR RT/LT       surgery testis exploration of undescended.  1985     TESTICLE SURGERY       VASECTOMY         FAMILY HISTORY: History reviewed. No pertinent family history.    SOCIAL HISTORY:   Social History     Socioeconomic History     Marital status:      Spouse name: None     Number of children: None     Years of education: None     Highest education level: None   Occupational History     None   Tobacco Use     Smoking status: Never Smoker     Smokeless tobacco: Never Used   Substance and Sexual Activity     Alcohol use: Yes     Drug use: No     Sexual activity: Never   Other Topics Concern     Parent/sibling w/ CABG, MI or angioplasty before 65F 55M? Not Asked   Social History Narrative     None     Social Determinants of Health     Financial Resource Strain:      Difficulty of Paying Living Expenses:    Food Insecurity:      Worried About Running Out of Food in the Last Year:      Ran Out of Food in the Last Year:    Transportation Needs:      Lack of Transportation  "(Medical):      Lack of Transportation (Non-Medical):    Physical Activity:      Days of Exercise per Week:      Minutes of Exercise per Session:    Stress:      Feeling of Stress :    Social Connections:      Frequency of Communication with Friends and Family:      Frequency of Social Gatherings with Friends and Family:      Attends Protestant Services:      Active Member of Clubs or Organizations:      Attends Club or Organization Meetings:      Marital Status:    Intimate Partner Violence:      Fear of Current or Ex-Partner:      Emotionally Abused:      Physically Abused:      Sexually Abused:        Review Of Systems:  Skin: No rash, pruritis, or skin pigmentation  Eyes: No changes in vision  Ears/Nose/Throat: No changes in hearing, no nosebleeds  Respiratory: No shortness of breath, dyspnea on exertion, cough, or hemoptysis  Cardiovascular: No chest pain or palpitations  Gastrointestinal: No diarrhea or constipation. No abdominal pain. No hematochezia  Genitourinary: see HPI  Musculoskeletal: No pain or swelling of joints, normal range of motion  Neurologic: No weakness or tremors  Psychiatric: No recent changes in memory or mood  Hematologic/Lymphatic/Immunologic: No easy bruising or enlarged lymph nodes  Endocrine: No weight gain or loss      PHYSICAL EXAM:    /80   Pulse 69   Ht 1.753 m (5' 9\")   Wt 81.6 kg (180 lb)   SpO2 96%   BMI 26.58 kg/m      Constitutional: Well developed. Conversant and in no acute distress  Eyes: Anicteric sclera, conjunctiva clear, normal extraocular movements  ENT: Normocephalic and atraumatic,   Skin: Warm and dry. No rashes or lesions  Cardiac: No peripheral edema  Back/Flank: Not done  CNS/PNS: Normal musculature and movements, moves all extremities normally  Respiratory: Normal non-labored breathing  Abdomen: Soft nontender and nondistended  Peripheral Vascular: No peripheral edema  Mental Status/Psych: Alert and Oriented x 3. Normal mood and affect    Penis: Not " done  Scrotal Skin: Not done  Testicles: Not done  Epididymis: Not done  Digital Rectal Exam: The prostate is slightly enlarged, benign and symmetric to palpation    Cystoscopy: Not done    Imaging: None    Urinalysis: UA RESULTS:  Recent Labs   Lab Test 04/26/18  1007   COLOR Yellow   APPEARANCE Clear   URINEGLC Negative   URINEBILI Negative   URINEKETONE Negative   SG 1.020   UBLD Negative   URINEPH 6.0   PROTEIN Negative   UROBILINOGEN 0.2   NITRITE Negative   LEUKEST Negative       PSA: 6.2    Post Void Residual:     Other labs: None today      IMPRESSION:  Low risk prostate cancer    PLAN:  His PSA has risen at this visit but it has been at the same level twice before, once in 2018 and another time in 2020.  At this time I feel it is reasonable to continue active surveillance of his prostate cancer.  He agreed to the plan.  I will see him back in 6 months for another PSA exam      Kamlesh Henry M.D.

## 2021-08-10 NOTE — PROGRESS NOTES
Office Visit Note  [facility]rology Clinic  [phone]    UROLOGIC DIAGNOSES:   Low risk prostate cancer    CURRENT INTERVENTIONS:   Active surveillance, prostate biopsies x2    HISTORY:   Agustin returns to clinic today for prostate cancer follow-up.  His PSA has risen today, now at 6.2.  He continues to have no urinary symptoms or complaints.      PAST MEDICAL HISTORY:   Past Medical History:   Diagnosis Date     Esophageal reflux        PAST SURGICAL HISTORY:   Past Surgical History:   Procedure Laterality Date     APPENDECTOMY       cystoscopy with pyeloscopy with removal of calculus.       PROSTATE SURGERY       ROTATOR CUFF REPAIR RT/LT       surgery testis exploration of undescended.  1985     TESTICLE SURGERY       VASECTOMY         FAMILY HISTORY: History reviewed. No pertinent family history.    SOCIAL HISTORY:   Social History     Socioeconomic History     Marital status:      Spouse name: None     Number of children: None     Years of education: None     Highest education level: None   Occupational History     None   Tobacco Use     Smoking status: Never Smoker     Smokeless tobacco: Never Used   Substance and Sexual Activity     Alcohol use: Yes     Drug use: No     Sexual activity: Never   Other Topics Concern     Parent/sibling w/ CABG, MI or angioplasty before 65F 55M? Not Asked   Social History Narrative     None     Social Determinants of Health     Financial Resource Strain:      Difficulty of Paying Living Expenses:    Food Insecurity:      Worried About Running Out of Food in the Last Year:      Ran Out of Food in the Last Year:    Transportation Needs:      Lack of Transportation (Medical):      Lack of Transportation (Non-Medical):    Physical Activity:      Days of Exercise per Week:      Minutes of Exercise per Session:    Stress:      Feeling of Stress :    Social Connections:      Frequency of Communication with Friends and Family:      Frequency of Social Gatherings with Friends  "and Family:      Attends Spiritism Services:      Active Member of Clubs or Organizations:      Attends Club or Organization Meetings:      Marital Status:    Intimate Partner Violence:      Fear of Current or Ex-Partner:      Emotionally Abused:      Physically Abused:      Sexually Abused:        Review Of Systems:  Skin: No rash, pruritis, or skin pigmentation  Eyes: No changes in vision  Ears/Nose/Throat: No changes in hearing, no nosebleeds  Respiratory: No shortness of breath, dyspnea on exertion, cough, or hemoptysis  Cardiovascular: No chest pain or palpitations  Gastrointestinal: No diarrhea or constipation. No abdominal pain. No hematochezia  Genitourinary: see HPI  Musculoskeletal: No pain or swelling of joints, normal range of motion  Neurologic: No weakness or tremors  Psychiatric: No recent changes in memory or mood  Hematologic/Lymphatic/Immunologic: No easy bruising or enlarged lymph nodes  Endocrine: No weight gain or loss      PHYSICAL EXAM:    /80   Pulse 69   Ht 1.753 m (5' 9\")   Wt 81.6 kg (180 lb)   SpO2 96%   BMI 26.58 kg/m      Constitutional: Well developed. Conversant and in no acute distress  Eyes: Anicteric sclera, conjunctiva clear, normal extraocular movements  ENT: Normocephalic and atraumatic,   Skin: Warm and dry. No rashes or lesions  Cardiac: No peripheral edema  Back/Flank: Not done  CNS/PNS: Normal musculature and movements, moves all extremities normally  Respiratory: Normal non-labored breathing  Abdomen: Soft nontender and nondistended  Peripheral Vascular: No peripheral edema  Mental Status/Psych: Alert and Oriented x 3. Normal mood and affect    Penis: Not done  Scrotal Skin: Not done  Testicles: Not done  Epididymis: Not done  Digital Rectal Exam: The prostate is slightly enlarged, benign and symmetric to palpation    Cystoscopy: Not done    Imaging: None    Urinalysis: UA RESULTS:  Recent Labs   Lab Test 04/26/18  1007   COLOR Yellow   APPEARANCE Clear "   URINEGLC Negative   URINEBILI Negative   URINEKETONE Negative   SG 1.020   UBLD Negative   URINEPH 6.0   PROTEIN Negative   UROBILINOGEN 0.2   NITRITE Negative   LEUKEST Negative       PSA: 6.2    Post Void Residual:     Other labs: None today      IMPRESSION:  Low risk prostate cancer    PLAN:  His PSA has risen at this visit but it has been at the same level twice before, once in 2018 and another time in 2020.  At this time I feel it is reasonable to continue active surveillance of his prostate cancer.  He agreed to the plan.  I will see him back in 6 months for another PSA exam      Kamlesh Henry M.D.

## 2021-08-11 DIAGNOSIS — K21.00 GASTROESOPHAGEAL REFLUX DISEASE WITH ESOPHAGITIS: Primary | ICD-10-CM

## 2021-08-11 NOTE — TELEPHONE ENCOUNTER
Last Office Visit: 4/16/21  [place] [facility] [place]  Medication last refilled: 7/22/20 #90 with 3 refill(s)    Prescription approved per [facility] Refill Protocol.    Nereida Ohara RN, BSN

## 2021-09-19 ENCOUNTER — HEALTH MAINTENANCE LETTER (OUTPATIENT)
Age: 76
End: 2021-09-19

## 2022-02-10 ENCOUNTER — OFFICE VISIT (OUTPATIENT)
Dept: UROLOGY | Facility: CLINIC | Age: 77
End: 2022-02-10
Payer: MEDICARE

## 2022-02-10 VITALS
DIASTOLIC BLOOD PRESSURE: 78 MMHG | BODY MASS INDEX: 25.34 KG/M2 | SYSTOLIC BLOOD PRESSURE: 132 MMHG | WEIGHT: 177 LBS | HEIGHT: 70 IN

## 2022-02-10 DIAGNOSIS — C61 PROSTATE CANCER (H): Primary | ICD-10-CM

## 2022-02-10 LAB — PSA SERPL-MCNC: 6.7 UG/L (ref 0–4)

## 2022-02-10 PROCEDURE — 84153 ASSAY OF PSA TOTAL: CPT | Performed by: UROLOGY

## 2022-02-10 PROCEDURE — 99214 OFFICE O/P EST MOD 30 MIN: CPT | Performed by: UROLOGY

## 2022-02-10 PROCEDURE — 36415 COLL VENOUS BLD VENIPUNCTURE: CPT | Performed by: UROLOGY

## 2022-02-10 ASSESSMENT — MIFFLIN-ST. JEOR: SCORE: 1541.18

## 2022-02-10 ASSESSMENT — PAIN SCALES - GENERAL: PAINLEVEL: NO PAIN (0)

## 2022-02-10 NOTE — LETTER
2/10/2022       RE: Agustin Nolan  [address] [place] [place] [address]     Dear Colleague,    Thank you for referring your patient, Agustin Nolan, to the [facility] UROLOGY [facility]ERT[facility] at [facility]. Please see a copy of my visit note below.    Office Visit Note  [facility]rology Clinic  [phone]    UROLOGIC DIAGNOSES:   Low risk prostate cancer    CURRENT INTERVENTIONS:   Active surveillance  Prostate biopsies x2    HISTORY:   Agustin returns to clinic today for prostate cancer follow-up.  His PSA is up further, now at 6.7.      PAST MEDICAL HISTORY:   Past Medical History:   Diagnosis Date     Esophageal reflux        PAST SURGICAL HISTORY:   Past Surgical History:   Procedure Laterality Date     APPENDECTOMY       cystoscopy with pyeloscopy with removal of calculus.       PROSTATE SURGERY       ROTATOR CUFF REPAIR RT/LT       surgery testis exploration of undescended.  1985     TESTICLE SURGERY       VASECTOMY         FAMILY HISTORY: History reviewed. No pertinent family history.    SOCIAL HISTORY:   Social History     Socioeconomic History     Marital status:      Spouse name: None     Number of children: None     Years of education: None     Highest education level: None   Occupational History     None   Tobacco Use     Smoking status: Never Smoker     Smokeless tobacco: Never Used   Substance and Sexual Activity     Alcohol use: Yes     Drug use: No     Sexual activity: Never   Other Topics Concern     Parent/sibling w/ CABG, MI or angioplasty before 65F 55M? Not Asked   Social History Narrative     None     Social Determinants of Health     Financial Resource Strain: Not on file   Food Insecurity: Not on file   Transportation Needs: Not on file   Physical Activity: Not on file   Stress: Not on file   Social Connections: Not on file   Intimate Partner Violence: Not on file   Housing Stability: Not on file       Review Of Systems:  Skin: No  "rash, pruritis, or skin pigmentation  Eyes: No changes in vision  Ears/Nose/Throat: No changes in hearing, no nosebleeds  Respiratory: No shortness of breath, dyspnea on exertion, cough, or hemoptysis  Cardiovascular: No chest pain or palpitations  Gastrointestinal: No diarrhea or constipation. No abdominal pain. No hematochezia  Genitourinary: see HPI  Musculoskeletal: No pain or swelling of joints, normal range of motion  Neurologic: No weakness or tremors  Psychiatric: No recent changes in memory or mood  Hematologic/Lymphatic/Immunologic: No easy bruising or enlarged lymph nodes  Endocrine: No weight gain or loss      PHYSICAL EXAM:    /78   Ht 1.765 m (5' 9.5\")   Wt 80.3 kg (177 lb)   BMI 25.76 kg/m      Constitutional: Well developed. Conversant and in no acute distress  Eyes: Anicteric sclera, conjunctiva clear, normal extraocular movements  ENT: Normocephalic and atraumatic,   Skin: Warm and dry. No rashes or lesions  Cardiac: No peripheral edema  Back/Flank: Not done  CNS/PNS: Normal musculature and movements, moves all extremities normally  Respiratory: Normal non-labored breathing  Abdomen: Soft nontender and nondistended  Peripheral Vascular: No peripheral edema  Mental Status/Psych: Alert and Oriented x 3. Normal mood and affect    Penis: Not done  Scrotal Skin: Not done  Testicles: Not done  Epididymis: Not done  Digital Rectal Exam: The prostate feels slightly enlarged, benign and symmetric to palpation    Cystoscopy: Not done    Imaging: None    Urinalysis: UA RESULTS:  Recent Labs   Lab Test 04/26/18  1007   COLOR Yellow   APPEARANCE Clear   URINEGLC Negative   URINEBILI Negative   URINEKETONE Negative   SG 1.020   UBLD Negative   URINEPH 6.0   PROTEIN Negative   UROBILINOGEN 0.2   NITRITE Negative   LEUKEST Negative       PSA: 6.7    Post Void Residual:     Other labs: None today      IMPRESSION:  Elevated PSA    PLAN:  His PSA continues to rise.  It is risen half a point over the past 6 " months.  We discussed options.  We could simply continue with PSA checks every 6 months.      We also discussed possibly sending his most recent biopsy specimen for Oncotype DX testing.  His 2018 specimen was sent for Oncotype DX testing and it was consistent with a low-grade prostate cancer.  The 2020 specimen was not sent since his biopsy and PSA were stable at the time.  We discussed possibly sending Oncotype DX test from the 2020 specimen but after discussion we decided to decline.  We could consider this in the future but that specimen is 2 years old now and the prostate cancer may have become more aggressive in the interim.    We also discussed the possibility of repeating an MRI of the prostate and possibly even another prostate biopsy.  He wishes to proceed with this option.  It has been over 3 years since his most recent MRI of the prostate.  MRI of the prostate was ordered for him and I will go over the results with him via virtual visit.      Kamlesh Henry M.D.

## 2022-02-10 NOTE — NURSING NOTE
Chief Complaint   Patient presents with     Elevated PSA     Pt here for 6 month follow up with PSA check     Nirmala Edward CMA

## 2022-02-10 NOTE — PROGRESS NOTES
Office Visit Note  [facility] Urology Clinic  [phone]    UROLOGIC DIAGNOSES:   Low risk prostate cancer    CURRENT INTERVENTIONS:   Active surveillance  Prostate biopsies x2    HISTORY:   Agustin returns to clinic today for prostate cancer follow-up.  His PSA is up further, now at 6.7.      PAST MEDICAL HISTORY:   Past Medical History:   Diagnosis Date     Esophageal reflux        PAST SURGICAL HISTORY:   Past Surgical History:   Procedure Laterality Date     APPENDECTOMY       cystoscopy with pyeloscopy with removal of calculus.       PROSTATE SURGERY       ROTATOR CUFF REPAIR RT/LT       surgery testis exploration of undescended.  1985     TESTICLE SURGERY       VASECTOMY         FAMILY HISTORY: History reviewed. No pertinent family history.    SOCIAL HISTORY:   Social History     Socioeconomic History     Marital status:      Spouse name: None     Number of children: None     Years of education: None     Highest education level: None   Occupational History     None   Tobacco Use     Smoking status: Never Smoker     Smokeless tobacco: Never Used   Substance and Sexual Activity     Alcohol use: Yes     Drug use: No     Sexual activity: Never   Other Topics Concern     Parent/sibling w/ CABG, MI or angioplasty before 65F 55M? Not Asked   Social History Narrative     None     Social Determinants of Health     Financial Resource Strain: Not on file   Food Insecurity: Not on file   Transportation Needs: Not on file   Physical Activity: Not on file   Stress: Not on file   Social Connections: Not on file   Intimate Partner Violence: Not on file   Housing Stability: Not on file       Review Of Systems:  Skin: No rash, pruritis, or skin pigmentation  Eyes: No changes in vision  Ears/Nose/Throat: No changes in hearing, no nosebleeds  Respiratory: No shortness of breath, dyspnea on exertion, cough, or hemoptysis  Cardiovascular: No chest pain or palpitations  Gastrointestinal: No diarrhea or constipation. No  "abdominal pain. No hematochezia  Genitourinary: see HPI  Musculoskeletal: No pain or swelling of joints, normal range of motion  Neurologic: No weakness or tremors  Psychiatric: No recent changes in memory or mood  Hematologic/Lymphatic/Immunologic: No easy bruising or enlarged lymph nodes  Endocrine: No weight gain or loss      PHYSICAL EXAM:    /78   Ht 1.765 m (5' 9.5\")   Wt 80.3 kg (177 lb)   BMI 25.76 kg/m      Constitutional: Well developed. Conversant and in no acute distress  Eyes: Anicteric sclera, conjunctiva clear, normal extraocular movements  ENT: Normocephalic and atraumatic,   Skin: Warm and dry. No rashes or lesions  Cardiac: No peripheral edema  Back/Flank: Not done  CNS/PNS: Normal musculature and movements, moves all extremities normally  Respiratory: Normal non-labored breathing  Abdomen: Soft nontender and nondistended  Peripheral Vascular: No peripheral edema  Mental Status/Psych: Alert and Oriented x 3. Normal mood and affect    Penis: Not done  Scrotal Skin: Not done  Testicles: Not done  Epididymis: Not done  Digital Rectal Exam: The prostate feels slightly enlarged, benign and symmetric to palpation    Cystoscopy: Not done    Imaging: None    Urinalysis: UA RESULTS:  Recent Labs   Lab Test 04/26/18  1007   COLOR Yellow   APPEARANCE Clear   URINEGLC Negative   URINEBILI Negative   URINEKETONE Negative   SG 1.020   UBLD Negative   URINEPH 6.0   PROTEIN Negative   UROBILINOGEN 0.2   NITRITE Negative   LEUKEST Negative       PSA: 6.7    Post Void Residual:     Other labs: None today      IMPRESSION:  Elevated PSA    PLAN:  His PSA continues to rise.  It is risen half a point over the past 6 months.  We discussed options.  We could simply continue with PSA checks every 6 months.      We also discussed possibly sending his most recent biopsy specimen for Oncotype DX testing.  His 2018 specimen was sent for Oncotype DX testing and it was consistent with a low-grade prostate cancer.  The " 2020 specimen was not sent since his biopsy and PSA were stable at the time.  We discussed possibly sending Oncotype DX test from the 2020 specimen but after discussion we decided to decline.  We could consider this in the future but that specimen is 2 years old now and the prostate cancer may have become more aggressive in the interim.    We also discussed the possibility of repeating an MRI of the prostate and possibly even another prostate biopsy.  He wishes to proceed with this option.  It has been over 3 years since his most recent MRI of the prostate.  MRI of the prostate was ordered for him and I will go over the results with him via virtual visit.      Kamlesh Henry M.D.

## 2022-02-22 ENCOUNTER — TRANSFERRED RECORDS (OUTPATIENT)
Dept: HEALTH INFORMATION MANAGEMENT | Facility: CLINIC | Age: 77
End: 2022-02-22
Payer: MEDICARE

## 2022-02-22 LAB — RETINOPATHY: NEGATIVE

## 2022-03-03 ENCOUNTER — ANCILLARY PROCEDURE (OUTPATIENT)
Dept: MRI IMAGING | Facility: CLINIC | Age: 77
End: 2022-03-03
Attending: UROLOGY
Payer: MEDICARE

## 2022-03-03 DIAGNOSIS — C61 PROSTATE CANCER (H): ICD-10-CM

## 2022-03-03 PROCEDURE — A9585 GADOBUTROL INJECTION: HCPCS | Performed by: RADIOLOGY

## 2022-03-03 PROCEDURE — 72197 MRI PELVIS W/O & W/DYE: CPT | Performed by: RADIOLOGY

## 2022-03-03 RX ORDER — GADOBUTROL 604.72 MG/ML
10 INJECTION INTRAVENOUS ONCE
Status: COMPLETED | OUTPATIENT
Start: 2022-03-03 | End: 2022-03-03

## 2022-03-03 RX ADMIN — GADOBUTROL 8 ML: 604.72 INJECTION INTRAVENOUS at 13:28

## 2022-03-05 ENCOUNTER — HEALTH MAINTENANCE LETTER (OUTPATIENT)
Age: 77
End: 2022-03-05

## 2022-03-08 ENCOUNTER — VIRTUAL VISIT (OUTPATIENT)
Dept: UROLOGY | Facility: CLINIC | Age: 77
End: 2022-03-08
Payer: MEDICARE

## 2022-03-08 VITALS — HEIGHT: 70 IN | BODY MASS INDEX: 25.05 KG/M2 | WEIGHT: 175 LBS

## 2022-03-08 DIAGNOSIS — C61 PROSTATE CANCER (H): Primary | ICD-10-CM

## 2022-03-08 PROCEDURE — 99213 OFFICE O/P EST LOW 20 MIN: CPT | Mod: 95 | Performed by: UROLOGY

## 2022-03-08 ASSESSMENT — PAIN SCALES - GENERAL: PAINLEVEL: NO PAIN (0)

## 2022-03-08 NOTE — LETTER
3/8/2022       RE: Agustin Nolan  [address] [facility] [address]     Dear Colleague,    Thank you for referring your patient, Agustin Nolan, to the [facility] UROLOGY [facility]IC [facility] at [facility]. Please see a copy of my visit note below.    *PT WILL MEET YOU IN MYCHART*    AGUSTIN is a 76 year old who is being evaluated via a billable video visit.      How would you like to obtain your AVS? MyChart  If the video visit is dropped, the invitation should be resent by: Text to cell phone: [phone]  Will anyone else be joining your video visit? No         Office Visit Note  [facility]rology Clinic  [phone]    UROLOGIC DIAGNOSES:   Low risk prostate cancer    CURRENT INTERVENTIONS:   Active surveillance  Prostate biopsies x2  MRI prostate x2    HISTORY:   Agustin underwent repeat MRI of the prostate.  The MRI showed a 44 g prostate.  This was an increase in size since 2018.  The current MRI does show a single PI-RADS 3 lesion at the left apex.  The lesion measured 14 x 12 mm and there is no evidence of extraprostatic extension.  The MRI appears similar to the MRI from 2018.  He continues to have no major urinary symptoms or complaints.      PAST MEDICAL HISTORY:   Past Medical History:   Diagnosis Date     Esophageal reflux        PAST SURGICAL HISTORY:   Past Surgical History:   Procedure Laterality Date     APPENDECTOMY       cystoscopy with pyeloscopy with removal of calculus.       PROSTATE SURGERY       ROTATOR CUFF REPAIR RT/LT       surgery testis exploration of undescended.  1985     TESTICLE SURGERY       VASECTOMY         FAMILY HISTORY: No family history on file.    SOCIAL HISTORY:   Social History     Tobacco Use     Smoking status: Never Smoker     Smokeless tobacco: Never Used   Substance Use Topics     Alcohol use: Yes       REVIEW OF SYSTEMS:  Skin: No rash, pruritis, or skin pigmentation  Eyes: No changes in vision  Ears/Nose/Throat: No  changes in hearing, no nosebleeds  Respiratory: No shortness of breath, dyspnea on exertion, cough, or hemoptysis  Cardiovascular: No chest pain or palpitations  Gastrointestinal: No diarrhea or constipation. No abdominal pain. No hematochezia  Genitourinary: see HPI  Musculoskeletal: No pain or swelling of joints, normal range of motion  Neurologic: No weakness or tremors  Psychiatric: No recent changes in memory or mood  Hematologic/Lymphatic/Immunologic: No easy bruising or enlarged lymph nodes  Endocrine: No weight gain or loss      PHYSICAL EXAM:    General: Alert and oriented to time, place, and self. In NAD   HEENT: Head AT/NC, EOMI, CN Grossly intact   Lungs: no respiratory distress, or pursed lip breathing   Heart: No obvious jugular venous distension present   Musculoskeltal: Normal movements. Normal appearing musculature  Skin: no suspicious lesions or rashes   Neuro: Alert, oriented, speech and mentation normal; moving all 4 extremities equally.   Psych: affect and mood normal    Imaging: None    Urinalysis: UA RESULTS:  Recent Labs   Lab Test 04/26/18  1007   COLOR Yellow   APPEARANCE Clear   URINEGLC Negative   URINEBILI Negative   URINEKETONE Negative   SG 1.020   UBLD Negative   URINEPH 6.0   PROTEIN Negative   UROBILINOGEN 0.2   NITRITE Negative   LEUKEST Negative       PSA: 6.7    Post Void Residual:     Other labs: None today      IMPRESSION:  Low risk prostate cancer  Enlarged prostate    PLAN:  We discussed his MRI results.  The MRI shows some growth in the size of the prostate but otherwise appears fairly similar to the MRI from 2018.  We discussed options and we will resume with active surveillance.  I will see him back in 6 months for another PSA.      Kamlesh Henry M.D.              Video Start Time: 9:02 AM  Video-Visit Details    Type of service:  Video Visit    Video End Time:9:09 AM    Originating Location (pt. Location): Home    Distant Location (provider location):   [employer]  [place] [facility] [place]     Platform used for Video Visit: Gabino

## 2022-03-08 NOTE — PROGRESS NOTES
*PT WILL MEET YOU IN MYCHART*    AGUSTIN is a 76 year old who is being evaluated via a billable video visit.      How would you like to obtain your AVS? [employer]  If the video visit is dropped, the invitation should be resent by: Text to cell phone: [phone]  Will anyone else be joining your video visit? No         Office Visit Note  [facility]  [phone]    UROLOGIC DIAGNOSES:   Low risk prostate cancer    CURRENT INTERVENTIONS:   Active surveillance  Prostate biopsies x2  MRI prostate x2    HISTORY:   Agustin underwent repeat MRI of the prostate.  The MRI showed a 44 g prostate.  This was an increase in size since 2018.  The current MRI does show a single PI-RADS 3 lesion at the left apex.  The lesion measured 14 x 12 mm and there is no evidence of extraprostatic extension.  The MRI appears similar to the MRI from 2018.  He continues to have no major urinary symptoms or complaints.      PAST MEDICAL HISTORY:   Past Medical History:   Diagnosis Date     Esophageal reflux        PAST SURGICAL HISTORY:   Past Surgical History:   Procedure Laterality Date     APPENDECTOMY       cystoscopy with pyeloscopy with removal of calculus.       PROSTATE SURGERY       ROTATOR CUFF REPAIR RT/LT       surgery testis exploration of undescended.  1985     TESTICLE SURGERY       VASECTOMY         FAMILY HISTORY: No family history on file.    SOCIAL HISTORY:   Social History     Tobacco Use     Smoking status: Never Smoker     Smokeless tobacco: Never Used   Substance Use Topics     Alcohol use: Yes       REVIEW OF SYSTEMS:  Skin: No rash, pruritis, or skin pigmentation  Eyes: No changes in vision  Ears/Nose/Throat: No changes in hearing, no nosebleeds  Respiratory: No shortness of breath, dyspnea on exertion, cough, or hemoptysis  Cardiovascular: No chest pain or palpitations  Gastrointestinal: No diarrhea or constipation. No abdominal pain. No hematochezia  Genitourinary: see HPI  Musculoskeletal: No pain or  swelling of joints, normal range of motion  Neurologic: No weakness or tremors  Psychiatric: No recent changes in memory or mood  Hematologic/Lymphatic/Immunologic: No easy bruising or enlarged lymph nodes  Endocrine: No weight gain or loss      PHYSICAL EXAM:    General: Alert and oriented to time, place, and self. In NAD   HEENT: Head AT/NC, EOMI, CN Grossly intact   Lungs: no respiratory distress, or pursed lip breathing   Heart: No obvious jugular venous distension present   Musculoskeltal: Normal movements. Normal appearing musculature  Skin: no suspicious lesions or rashes   Neuro: Alert, oriented, speech and mentation normal; moving all 4 extremities equally.   Psych: affect and mood normal    Imaging: None    Urinalysis: UA RESULTS:  Recent Labs   Lab Test 04/26/18  1007   COLOR Yellow   APPEARANCE Clear   URINEGLC Negative   URINEBILI Negative   URINEKETONE Negative   SG 1.020   UBLD Negative   URINEPH 6.0   PROTEIN Negative   UROBILINOGEN 0.2   NITRITE Negative   LEUKEST Negative       PSA: 6.7    Post Void Residual:     Other labs: None today      IMPRESSION:  Low risk prostate cancer  Enlarged prostate    PLAN:  We discussed his MRI results.  The MRI shows some growth in the size of the prostate but otherwise appears fairly similar to the MRI from 2018.  We discussed options and we will resume with active surveillance.  I will see him back in 6 months for another PSA.      Kamlesh Henry M.D.              Video Start Time: 9:02 AM  Video-Visit Details    Type of service:  Video Visit    Video End Time:9:09 AM    Originating Location (pt. Location): Home    Distant Location (provider location):  [facility] [place]     Platform used for Video Visit: [email or web address]

## 2022-04-12 DIAGNOSIS — E78.5 HYPERLIPIDEMIA LDL GOAL <100: ICD-10-CM

## 2022-04-12 DIAGNOSIS — E11.9 TYPE 2 DIABETES MELLITUS WITHOUT COMPLICATION, WITHOUT LONG-TERM CURRENT USE OF INSULIN (H): ICD-10-CM

## 2022-04-12 DIAGNOSIS — E11.9 DIABETES MELLITUS, TYPE 2 (H): ICD-10-CM

## 2022-04-12 DIAGNOSIS — K21.00 GASTROESOPHAGEAL REFLUX DISEASE WITH ESOPHAGITIS: ICD-10-CM

## 2022-04-12 NOTE — TELEPHONE ENCOUNTER
Last time prescribed: 4/2/21 , 100 strips  x 3 refills  Last office visit: 4/16/21  Next appointment: 4/27/22

## 2022-04-13 RX ORDER — METFORMIN HCL 500 MG
TABLET, EXTENDED RELEASE 24 HR ORAL
Qty: 180 TABLET | Refills: 0 | Status: SHIPPED | OUTPATIENT
Start: 2022-04-13 | End: 2022-07-21

## 2022-04-13 RX ORDER — PRAVASTATIN SODIUM 40 MG
40 TABLET ORAL DAILY
Qty: 90 TABLET | Refills: 0 | Status: SHIPPED | OUTPATIENT
Start: 2022-04-13 | End: 2022-07-13

## 2022-04-13 RX ORDER — BLOOD SUGAR DIAGNOSTIC
1 STRIP MISCELLANEOUS DAILY
Qty: 100 STRIP | Refills: 1 | Status: SHIPPED | OUTPATIENT
Start: 2022-04-13 | End: 2022-10-12

## 2022-04-13 NOTE — TELEPHONE ENCOUNTER
Metformin (Glucophage XR) 500 mg, Pravastatin (Pravachol) 40 mg, Omeprazole (Prilosec) 20 mg, Accu-Chek Test Strips  Last Office Visit: 4/16/21  [facility] Appointments: 4/27/22  Medication last refilled:     8/10/21 #180 with 2 refill(s) - Metformin  8/9/21 #90 with 2 refill(s) - Pravachol  8/11/21 #90 with 2 refill(s) - Omeprazole  4/2/21 #100 with 3 refill(s) - Accu-Chek Test Strips    Required labs per protocol:    LAB REF RANGE 7/14/20 4/16/21   Creatinine 0.8-1.25 mg/dL 1.10 1.13   LDL < 100 mg/dL 99 116 High    Hgb A1C 4.1-5.7 % 6.9High  7.8 High      Routing refill request to provider for review/approval because:  Labs out of range:  Elevated LDL and Hgb A1C    LATOYA DasN, RN, CCM

## 2022-04-27 ENCOUNTER — OFFICE VISIT (OUTPATIENT)
Dept: FAMILY MEDICINE | Facility: CLINIC | Age: 77
End: 2022-04-27
Payer: MEDICARE

## 2022-04-27 VITALS
BODY MASS INDEX: 27.77 KG/M2 | WEIGHT: 194 LBS | RESPIRATION RATE: 15 BRPM | HEART RATE: 93 BPM | OXYGEN SATURATION: 97 % | HEIGHT: 70 IN | SYSTOLIC BLOOD PRESSURE: 145 MMHG | DIASTOLIC BLOOD PRESSURE: 91 MMHG | TEMPERATURE: 97.6 F

## 2022-04-27 DIAGNOSIS — E11.9 TYPE 2 DIABETES MELLITUS WITHOUT COMPLICATION, WITHOUT LONG-TERM CURRENT USE OF INSULIN (H): ICD-10-CM

## 2022-04-27 DIAGNOSIS — Z13.220 SCREENING FOR LIPID DISORDERS: ICD-10-CM

## 2022-04-27 DIAGNOSIS — Z00.00 MEDICARE ANNUAL WELLNESS VISIT, SUBSEQUENT: Primary | ICD-10-CM

## 2022-04-27 DIAGNOSIS — Z12.5 SCREENING FOR PROSTATE CANCER: ICD-10-CM

## 2022-04-27 LAB
ANION GAP SERPL CALCULATED.3IONS-SCNC: 6 MMOL/L (ref 3–14)
BUN SERPL-MCNC: 15 MG/DL (ref 7–30)
CALCIUM SERPL-MCNC: 9.5 MG/DL (ref 8.5–10.1)
CHLORIDE BLD-SCNC: 104 MMOL/L (ref 94–109)
CHOLEST SERPL-MCNC: 163 MG/DL
CO2 SERPL-SCNC: 28 MMOL/L (ref 20–32)
CREAT SERPL-MCNC: 1 MG/DL (ref 0.66–1.25)
CREAT UR-MCNC: 213 MG/DL
FASTING STATUS PATIENT QL REPORTED: NO
GFR SERPL CREATININE-BSD FRML MDRD: 79 ML/MIN/1.73M2
GLUCOSE BLD-MCNC: 139 MG/DL (ref 70–99)
HBA1C MFR BLD: 7.6 % (ref 0–5.6)
HDLC SERPL-MCNC: 37 MG/DL
LDLC SERPL CALC-MCNC: 87 MG/DL
MICROALBUMIN UR-MCNC: 104 MG/L
MICROALBUMIN/CREAT UR: 48.83 MG/G CR (ref 0–17)
NONHDLC SERPL-MCNC: 126 MG/DL
POTASSIUM BLD-SCNC: 4.3 MMOL/L (ref 3.4–5.3)
PSA SERPL-MCNC: 6.97 UG/L (ref 0–4)
SODIUM SERPL-SCNC: 138 MMOL/L (ref 133–144)
TRIGL SERPL-MCNC: 197 MG/DL

## 2022-04-27 PROCEDURE — 80061 LIPID PANEL: CPT | Performed by: FAMILY MEDICINE

## 2022-04-27 PROCEDURE — 82043 UR ALBUMIN QUANTITATIVE: CPT | Performed by: FAMILY MEDICINE

## 2022-04-27 PROCEDURE — 80048 BASIC METABOLIC PNL TOTAL CA: CPT | Performed by: FAMILY MEDICINE

## 2022-04-27 PROCEDURE — G0103 PSA SCREENING: HCPCS | Performed by: FAMILY MEDICINE

## 2022-04-27 NOTE — NURSING NOTE
"76 year old  Chief Complaint   Patient presents with     Physical     Derm Problem     A couple spots to check        Blood pressure (!) 145/91, pulse 93, temperature 97.6  F (36.4  C), temperature source Skin, resp. rate 15, height 1.765 m (5' 9.5\"), weight 88 kg (194 lb), SpO2 97 %. Body mass index is 28.24 kg/m .  Patient Active Problem List   Diagnosis     Hyperlipidemia LDL goal <130     Medicare annual wellness visit, subsequent     Idiopathic peripheral neuropathy     Wears hearing aid     Diabetes mellitus, type 2 (H)       Wt Readings from Last 2 Encounters:   04/27/22 88 kg (194 lb)   03/08/22 79.4 kg (175 lb)     BP Readings from Last 3 Encounters:   04/27/22 (!) 145/91   02/10/22 132/78   08/10/21 130/80         Current Outpatient Medications   Medication     ACCU-CHEK GUIDE test strip     aspirin (ASA) 81 MG EC tablet     blood glucose monitoring (ACCU-CHEK JHON PLUS) meter device kit     Cholecalciferol (VITAMIN D) 2000 UNITS tablet     metFORMIN (GLUCOPHAGE-XR) 500 MG 24 hr tablet     omeprazole (PRILOSEC) 20 MG DR capsule     pravastatin (PRAVACHOL) 40 MG tablet     No current facility-administered medications for this visit.       Social History     Tobacco Use     Smoking status: Never Smoker     Smokeless tobacco: Never Used   Substance Use Topics     Alcohol use: Yes     Comment: 2 x per week     Drug use: No       Health Maintenance Due   Topic Date Due     ADVANCE CARE PLANNING  Never done     COVID-19 Vaccine (1) Never done     ZOSTER IMMUNIZATION (2 of 3) 03/24/2014     MICROALBUMIN  07/14/2021     A1C  01/23/2022     DTAP/TDAP/TD IMMUNIZATION (3 - Td or Tdap) 03/08/2022     BMP  04/16/2022     LIPID  04/16/2022     DIABETIC FOOT EXAM  04/16/2022     FALL RISK ASSESSMENT  04/16/2022     MEDICARE ANNUAL WELLNESS VISIT  04/16/2022       No results found for: PAP      April 27, 2022 1:26 PM    "

## 2022-05-22 NOTE — PROGRESS NOTES
CHIEF COMPLAINT:  Medicare annual wellness visit, subsequent.    HISTORY OF PRESENT ILLNESS:  Agustin is a 76-year-old gentleman here today for the above.  Overall, he is doing fairly well.  He has type 2 diabetes.  From an optimal diabetic care standpoint, the most recent A1c was 7.4%.  His blood pressure at home looks better, but today it is a bit elevated at 145/91.  He is not on blood pressure medication.  LDL cholesterol was 116.  He is on pravastatin 40 mg daily, and we discussed the fact that we really should really shoot to have his LDL cholesterol less than 100.  He would be willing to potentially change the medication based on results today.  My recommendation would be to switch over to simvastatin or atorvastatin.  He does not smoke.  He is on aspirin 81 mg daily.  We discussed the controversy about who should be on aspirin, but since he is diabetic, we will continue with that for now.    ACTIVE MEDICAL PROBLEMS:  Reviewed.    CURRENT MEDICATIONS:  Reviewed.    SOCIAL, FAMILY AND PAST SURGICAL HISTORY:  Reviewed.    HEALTHCARE MAINTENANCE:  He just had his eyes checked.  He wears hearing aids and hearing is quite good.  Dental care is up to date.  Blood pressure, as mentioned, is higher than it should be today.  Weight is higher than he would like it to be.  He is weighing about 177 at home (it looks like our number entered today might be erroneous).  He is due for labs today.  From a colon cancer screening standpoint, he had a Cologuard test last April and it was negative.  We will repeat that again in 2 years.  Immunizations:  Agustin has not received the COVID vaccines and has no intention of doing so.  He had a flu shot in November.  He has had both his pneumococcal vaccines.  He had a tetanus booster in 03/2012, so he is due for that.  He should get that at his pharmacy.  He had the initial shingles vaccine in 2014, and is holding off on the subsequent ones at this point.    MEDICARE QUESTIONS:  No  problems with activities of daily living.  No falls at home in the last year.  No symptoms of depression.  No memory loss.    OBJECTIVE:  Agustin is in no distress.  Blood pressure 145/91, as mentioned.  He will send in some readings from home.  Pulse is 93 and regular.  He is afebrile with a temperature of 97.6.  He is 5 feet 9.5 inches in height, and weighs 194 pounds.  BMI 28.24, but again this is based on a weight that could be close to 20 pounds more than it should be.  O2 sats are 97% on room air.  He is alert and oriented x3.  HEENT:  Head is normocephalic, atraumatic.  Ears are free of any cerumen.  Hearing aids fit well.  There is no irritation of the ear canals.  There is no pain with palpation of frontal or maxillary sinuses.  Eyes are grossly normal.  Nose and mouth were masked at our request.  Neck:  Supple without any anterior or posterior chain adenopathy.  No visible or palpable thyromegaly.  No carotid bruits.  LUNGS:  Clear to auscultation bilaterally.  CARDIAC:  Heart reveals a regular rate and rhythm without murmur.    ABDOMEN:  Benign.    EXTREMITIES:  Ankles are free of any edema.  Foot exam reveals normal monofilament testing throughout.    ASSESSMENT/PLAN:    1.  Agustin is a 76-year-old gentleman here today for a Medicare annual wellness visit, subsequent.  Up to date with most healthcare maintenance strategies.  2.  Colon cancer screening is up to date with a Cologuard test about a year ago.  Repeat in 2 years.  3.  PSA 50 testing today.  4.  Type 2 diabetes.  Perhaps at near optimal control.  A1c should be under 8%.  Blood pressure readings at home are likely less than 140/90, and he will send those results to me.  If his LDL is not less than 100, we will consider switching from pravastatin to simvastatin and check again in approximately 6-8 weeks.  5.  He is not up to date with immunizations and has no interest in receiving the COVID vaccines.  He understands there is a medication like  Paxlovid available that could be prescribed if he is symptomatic.  He could also get monoclonal antibodies.  He is aware of that.    6.  I would recommend that he get a tetanus booster at his pharmacy as well as the 2-shot shingles vaccine.  He will certainly consider that.  7.  I look forward to seeing him back in approximately 6 months, given that he is a diabetic.

## 2022-07-12 DIAGNOSIS — K21.00 GASTROESOPHAGEAL REFLUX DISEASE WITH ESOPHAGITIS: ICD-10-CM

## 2022-07-12 DIAGNOSIS — E78.5 HYPERLIPIDEMIA LDL GOAL <100: ICD-10-CM

## 2022-07-13 RX ORDER — PRAVASTATIN SODIUM 40 MG
40 TABLET ORAL DAILY
Qty: 90 TABLET | Refills: 0 | Status: SHIPPED | OUTPATIENT
Start: 2022-07-13 | End: 2022-10-10

## 2022-07-13 NOTE — TELEPHONE ENCOUNTER
Omeprazole (Prilosec) 20 mg + Pravastatin (Pravachol) 40 mg    Last Office Visit: 4/27/22  [facility] Appointments: None  Medication last refilled:     4/13/22 #90 with 0 refill(s)-Omeprazole  4/13/22 #90 with 0 refill(s)-Pravastatin    Required labs per protocol:    LAB REF RANGE 4/16/21 4/27/22   LDL < 100 mg/dL 116 High 87     Prescription approved per [facility] Refill Protocol.    Nereida Ohara, LATOYAN, RN, CCM

## 2022-07-20 DIAGNOSIS — E11.9 TYPE 2 DIABETES MELLITUS WITHOUT COMPLICATION, WITHOUT LONG-TERM CURRENT USE OF INSULIN (H): ICD-10-CM

## 2022-07-20 NOTE — TELEPHONE ENCOUNTER
Last time prescribed: 4/13/22 , 180 tabs/caps x 0 refills  Last office visit: 4/27/22  Next appointment: No Future Appointment Scheduled

## 2022-07-21 RX ORDER — METFORMIN HCL 500 MG
TABLET, EXTENDED RELEASE 24 HR ORAL
Qty: 180 TABLET | Refills: 1 | Status: SHIPPED | OUTPATIENT
Start: 2022-07-21 | End: 2023-01-09

## 2022-07-21 NOTE — TELEPHONE ENCOUNTER
Metformin (Glucophage XR) 500 mg    Last Office Visit: 4/27/22  Future [facility] Appointments: None  Medication last refilled: 4/13/22 #180 with 0 refill(s)    Required labs per protocol:    LAB REF RANGE 4/16/21 4/27/22   Creatinine 0.8-1.25 mg/dL 1.13 1.00   Hgb A1C 4.1-5.7 % 7.8 High 7.6 High     Routing refill request to provider for review/approval because:  Labs out of range:  Elevated Hgb A1C    LATOYA DasN, RN, CCM

## 2022-09-08 ENCOUNTER — OFFICE VISIT (OUTPATIENT)
Dept: UROLOGY | Facility: CLINIC | Age: 77
End: 2022-09-08
Payer: MEDICARE

## 2022-09-08 VITALS
SYSTOLIC BLOOD PRESSURE: 132 MMHG | HEIGHT: 70 IN | WEIGHT: 180 LBS | HEART RATE: 81 BPM | DIASTOLIC BLOOD PRESSURE: 74 MMHG | BODY MASS INDEX: 25.77 KG/M2 | OXYGEN SATURATION: 98 %

## 2022-09-08 DIAGNOSIS — C61 PROSTATE CANCER (H): Primary | ICD-10-CM

## 2022-09-08 LAB — PSA SERPL-MCNC: 6.6 UG/L (ref 0–4)

## 2022-09-08 PROCEDURE — 36415 COLL VENOUS BLD VENIPUNCTURE: CPT | Performed by: UROLOGY

## 2022-09-08 PROCEDURE — 99213 OFFICE O/P EST LOW 20 MIN: CPT | Performed by: UROLOGY

## 2022-09-08 PROCEDURE — 84153 ASSAY OF PSA TOTAL: CPT | Performed by: UROLOGY

## 2022-09-08 ASSESSMENT — PAIN SCALES - GENERAL: PAINLEVEL: NO PAIN (0)

## 2022-09-08 NOTE — PROGRESS NOTES
Office Visit Note  [facility]  [phone]    UROLOGIC DIAGNOSES:   Low risk prostate cancer    CURRENT INTERVENTIONS:   Active surveillance  Prostate biopsies x2  MRI prostate x2    HISTORY:   Agustin returns to clinic today for continued active surveillance of his prostate cancer.  His PSA today is slightly improved at 6.6.  He continues to have no urinary symptoms or complaints.      PAST MEDICAL HISTORY:   Past Medical History:   Diagnosis Date     Esophageal reflux        PAST SURGICAL HISTORY:   Past Surgical History:   Procedure Laterality Date     APPENDECTOMY       cystoscopy with pyeloscopy with removal of calculus.       PROSTATE SURGERY       ROTATOR CUFF REPAIR RT/LT       surgery testis exploration of undescended.  1985     TESTICLE SURGERY       VASECTOMY         FAMILY HISTORY: No family history on file.    SOCIAL HISTORY:   Social History     Socioeconomic History     Marital status:    Tobacco Use     Smoking status: Never Smoker     Smokeless tobacco: Never Used   Substance and Sexual Activity     Alcohol use: Yes     Comment: 2 x per week     Drug use: No     Sexual activity: Never       Review Of Systems:  Skin: No rash, pruritis, or skin pigmentation  Eyes: No changes in vision  Ears/Nose/Throat: No changes in hearing, no nosebleeds  Respiratory: No shortness of breath, dyspnea on exertion, cough, or hemoptysis  Cardiovascular: No chest pain or palpitations  Gastrointestinal: No diarrhea or constipation. No abdominal pain. No hematochezia  Genitourinary: see HPI  Musculoskeletal: No pain or swelling of joints, normal range of motion  Neurologic: No weakness or tremors  Psychiatric: No recent changes in memory or mood  Hematologic/Lymphatic/Immunologic: No easy bruising or enlarged lymph nodes  Endocrine: No weight gain or loss      PHYSICAL EXAM:    There were no vitals taken for this visit.    Constitutional: Well developed. Conversant and in no acute distress  Eyes:  Anicteric sclera, conjunctiva clear, normal extraocular movements  ENT: Normocephalic and atraumatic,   Skin: Warm and dry. No rashes or lesions  Cardiac: No peripheral edema  Back/Flank: Not done  CNS/PNS: Normal musculature and movements, moves all extremities normally  Respiratory: Normal non-labored breathing  Abdomen: Soft nontender and nondistended  Peripheral Vascular: No peripheral edema  Mental Status/Psych: Alert and Oriented x 3. Normal mood and affect    Penis: Not done  Scrotal Skin: Not done  Testicles: Not done  Epididymis: Not done  Digital Rectal Exam:     Cystoscopy: Not done    Imaging: None    Urinalysis: UA RESULTS:  Recent Labs   Lab Test 04/26/18  1007   COLOR Yellow   APPEARANCE Clear   URINEGLC Negative   URINEBILI Negative   URINEKETONE Negative   SG 1.020   UBLD Negative   URINEPH 6.0   PROTEIN Negative   UROBILINOGEN 0.2   NITRITE Negative   LEUKEST Negative       PSA: 6.6    Post Void Residual:     Other labs: None today      IMPRESSION:  Low risk prostate cancer    PLAN:  He is stable and doing well from a prostate cancer standpoint.  I recommended we continue to follow PSA every 6 months.      Kamlesh Henry M.D.

## 2022-09-08 NOTE — LETTER
9/8/2022       RE: Agustin Nolan  [address] [facility] [address]     Dear Colleague,    Thank you for referring your patient, Agustin Nolan, to the [facility]. Please see a copy of my visit note below.    Office Visit Note  [facility]  [phone]    UROLOGIC DIAGNOSES:   Low risk prostate cancer    CURRENT INTERVENTIONS:   Active surveillance  Prostate biopsies x2  MRI prostate x2    HISTORY:   Agustin returns to clinic today for continued active surveillance of his prostate cancer.  His PSA today is slightly improved at 6.6.  He continues to have no urinary symptoms or complaints.      PAST MEDICAL HISTORY:   Past Medical History:   Diagnosis Date     Esophageal reflux        PAST SURGICAL HISTORY:   Past Surgical History:   Procedure Laterality Date     APPENDECTOMY       cystoscopy with pyeloscopy with removal of calculus.       PROSTATE SURGERY       ROTATOR CUFF REPAIR RT/LT       surgery testis exploration of undescended.  1985     TESTICLE SURGERY       VASECTOMY         FAMILY HISTORY: No family history on file.    SOCIAL HISTORY:   Social History     Socioeconomic History     Marital status:    Tobacco Use     Smoking status: Never Smoker     Smokeless tobacco: Never Used   Substance and Sexual Activity     Alcohol use: Yes     Comment: 2 x per week     Drug use: No     Sexual activity: Never       Review Of Systems:  Skin: No rash, pruritis, or skin pigmentation  Eyes: No changes in vision  Ears/Nose/Throat: No changes in hearing, no nosebleeds  Respiratory: No shortness of breath, dyspnea on exertion, cough, or hemoptysis  Cardiovascular: No chest pain or palpitations  Gastrointestinal: No diarrhea or constipation. No abdominal pain. No hematochezia  Genitourinary: see HPI  Musculoskeletal: No pain or swelling of joints, normal range of motion  Neurologic: No weakness or  tremors  Psychiatric: No recent changes in memory or mood  Hematologic/Lymphatic/Immunologic: No easy bruising or enlarged lymph nodes  Endocrine: No weight gain or loss      PHYSICAL EXAM:    There were no vitals taken for this visit.    Constitutional: Well developed. Conversant and in no acute distress  Eyes: Anicteric sclera, conjunctiva clear, normal extraocular movements  ENT: Normocephalic and atraumatic,   Skin: Warm and dry. No rashes or lesions  Cardiac: No peripheral edema  Back/Flank: Not done  CNS/PNS: Normal musculature and movements, moves all extremities normally  Respiratory: Normal non-labored breathing  Abdomen: Soft nontender and nondistended  Peripheral Vascular: No peripheral edema  Mental Status/Psych: Alert and Oriented x 3. Normal mood and affect    Penis: Not done  Scrotal Skin: Not done  Testicles: Not done  Epididymis: Not done  Digital Rectal Exam:     Cystoscopy: Not done    Imaging: None    Urinalysis: UA RESULTS:  Recent Labs   Lab Test 04/26/18  1007   COLOR Yellow   APPEARANCE Clear   URINEGLC Negative   URINEBILI Negative   URINEKETONE Negative   SG 1.020   UBLD Negative   URINEPH 6.0   PROTEIN Negative   UROBILINOGEN 0.2   NITRITE Negative   LEUKEST Negative       PSA: 6.6    Post Void Residual:     Other labs: None today      IMPRESSION:  Low risk prostate cancer    PLAN:  He is stable and doing well from a prostate cancer standpoint.  I recommended we continue to follow PSA every 6 months.      Kamlesh Henry M.D.

## 2022-09-08 NOTE — NURSING NOTE
"Chief Complaint   Patient presents with     Prostate Cancer     Patient here today for Same day PSA and Exam       Blood pressure 132/74, pulse 81, height 1.765 m (5' 9.5\"), weight 81.6 kg (180 lb), SpO2 98 %. Body mass index is 26.2 kg/m .    Patient Active Problem List   Diagnosis     Hyperlipidemia LDL goal <130     Medicare annual wellness visit, subsequent     Idiopathic peripheral neuropathy     Wears hearing aid     Diabetes mellitus, type 2 (H)       Allergies   Allergen Reactions     Cats      Hard time breathing, running nose       Current Outpatient Medications   Medication Sig Dispense Refill     ACCU-CHEK GUIDE test strip 1 strip by In Vitro route daily To test blood sugar 100 strip 1     aspirin (ASA) 81 MG EC tablet Take 1 tablet (81 mg) by mouth daily 300 tablet 1     blood glucose monitoring (ACCU-CHEK JHON PLUS) meter device kit Use to test blood sugar 1 times daily or as directed. 1 kit 0     Cholecalciferol (VITAMIN D) 2000 UNITS tablet Take 1 tablet by mouth daily       metFORMIN (GLUCOPHAGE XR) 500 MG 24 hr tablet Take one tab with breakfast and one tablet with dinner. 180 tablet 1     omeprazole (PRILOSEC) 20 MG DR capsule Take 1 capsule (20 mg) by mouth daily 90 capsule 0     pravastatin (PRAVACHOL) 40 MG tablet Take 1 tablet (40 mg) by mouth daily 90 tablet 0       Social History     Tobacco Use     Smoking status: Never Smoker     Smokeless tobacco: Never Used   Substance Use Topics     Alcohol use: Yes     Comment: 2 x per week     Drug use: No         KEYONNA Verdugo  9/8/2022  11:19 AM       "

## 2022-10-10 DIAGNOSIS — E78.5 HYPERLIPIDEMIA LDL GOAL <100: ICD-10-CM

## 2022-10-10 DIAGNOSIS — E11.9 DIABETES MELLITUS, TYPE 2 (H): ICD-10-CM

## 2022-10-10 RX ORDER — PRAVASTATIN SODIUM 40 MG
40 TABLET ORAL DAILY
Qty: 90 TABLET | Refills: 1 | Status: SHIPPED | OUTPATIENT
Start: 2022-10-10 | End: 2023-04-05

## 2022-10-10 NOTE — TELEPHONE ENCOUNTER
Medication requested: pravastatin (PRAVACHOL) 40 MG tablet  Last office visit: 4/27/22  [facility] appointments: none  Medication last refilled: 7/13/22; 90 + 0 refills  Last qualifying labs:   Component      Latest Ref Rng & Units 4/27/2022   Cholesterol      <200 mg/dL 163   Triglycerides      <150 mg/dL 197 (H)   HDL Cholesterol      >=40 mg/dL 37 (L)   LDL Cholesterol Calculated      <=100 mg/dL 87   Non HDL Cholesterol      <130 mg/dL 126   Patient Fasting > 8hrs?       No     Prescription approved per [facility] Refill Protocol.    Sagar LINN, RN  10/10/22 4:37 PM

## 2022-10-12 RX ORDER — BLOOD SUGAR DIAGNOSTIC
1 STRIP MISCELLANEOUS DAILY
Qty: 100 STRIP | Refills: 1 | Status: SHIPPED | OUTPATIENT
Start: 2022-10-12 | End: 2023-04-07

## 2022-10-12 NOTE — TELEPHONE ENCOUNTER
Medication requested: ACCU-CHEK GUIDE test strip  Last office visit:  4/27/2022  [facility] appointments: none  Medication last refilled: 4/13/2022; 100 + 1 refill  Last qualifying labs: n/a    Prescription approved per [facility] Refill Protocol.    TEMITOPE Corcoran, RN  10/12/22, 2:50 PM

## 2022-10-13 ENCOUNTER — MYC REFILL (OUTPATIENT)
Dept: FAMILY MEDICINE | Facility: CLINIC | Age: 77
End: 2022-10-13

## 2022-10-13 DIAGNOSIS — K21.00 GASTROESOPHAGEAL REFLUX DISEASE WITH ESOPHAGITIS: ICD-10-CM

## 2022-10-14 NOTE — TELEPHONE ENCOUNTER
Medication requested: omeprazole (PRILOSEC) 20 MG DR capsule  Last office visit: 4/27/22  [facility] appointments: none  Medication last refilled: 7/13/22; 90 + 0 refills  Last qualifying labs: N/A    Prescription approved per [facility] Refill Protocol.    Sagar LINN, RN  10/14/22 5:06 PM

## 2022-11-20 ENCOUNTER — HEALTH MAINTENANCE LETTER (OUTPATIENT)
Age: 77
End: 2022-11-20

## 2023-01-09 ENCOUNTER — MYC MEDICAL ADVICE (OUTPATIENT)
Dept: FAMILY MEDICINE | Facility: CLINIC | Age: 78
End: 2023-01-09

## 2023-01-09 DIAGNOSIS — E11.9 TYPE 2 DIABETES MELLITUS WITHOUT COMPLICATION, WITHOUT LONG-TERM CURRENT USE OF INSULIN (H): ICD-10-CM

## 2023-01-11 RX ORDER — METFORMIN HCL 500 MG
TABLET, EXTENDED RELEASE 24 HR ORAL
Qty: 180 TABLET | Refills: 1 | Status: SHIPPED | OUTPATIENT
Start: 2023-01-11 | End: 2023-05-04

## 2023-01-11 NOTE — TELEPHONE ENCOUNTER
Last visit 4/27/22, future visit 5/2/23  Prescription approved per [facility] Refill Protocol.  Jenna Scanlon RN  [facility]

## 2023-02-28 ENCOUNTER — TRANSFERRED RECORDS (OUTPATIENT)
Dept: HEALTH INFORMATION MANAGEMENT | Facility: CLINIC | Age: 78
End: 2023-02-28
Payer: MEDICARE

## 2023-02-28 LAB — RETINOPATHY: NEGATIVE

## 2023-03-09 ENCOUNTER — OFFICE VISIT (OUTPATIENT)
Dept: UROLOGY | Facility: CLINIC | Age: 78
End: 2023-03-09
Payer: MEDICARE

## 2023-03-09 VITALS
HEIGHT: 69 IN | WEIGHT: 179 LBS | SYSTOLIC BLOOD PRESSURE: 136 MMHG | HEART RATE: 96 BPM | DIASTOLIC BLOOD PRESSURE: 74 MMHG | OXYGEN SATURATION: 98 % | BODY MASS INDEX: 26.51 KG/M2

## 2023-03-09 DIAGNOSIS — C61 PROSTATE CANCER (H): Primary | ICD-10-CM

## 2023-03-09 LAB — PSA SERPL-MCNC: 9.2 UG/L (ref 0–4)

## 2023-03-09 PROCEDURE — 84153 ASSAY OF PSA TOTAL: CPT | Performed by: UROLOGY

## 2023-03-09 PROCEDURE — 99213 OFFICE O/P EST LOW 20 MIN: CPT | Performed by: UROLOGY

## 2023-03-09 PROCEDURE — 36415 COLL VENOUS BLD VENIPUNCTURE: CPT | Performed by: UROLOGY

## 2023-03-09 RX ORDER — AMPICILLIN TRIHYDRATE 250 MG
CAPSULE ORAL
COMMUNITY
Start: 2023-01-15 | End: 2023-05-03

## 2023-03-09 ASSESSMENT — PAIN SCALES - GENERAL: PAINLEVEL: NO PAIN (0)

## 2023-03-09 NOTE — PROGRESS NOTES
Office Visit Note  [facility]  [phone]    UROLOGIC DIAGNOSES:   Low risk prostate cancer    CURRENT INTERVENTIONS:   Active surveillance  Prostate biopsy x2  MRI prostate x2    HISTORY:   Agustin returns to clinic today for continued monitoring of his prostate cancer.  The PSA today is up at 9.2.  He has no new urinary symptoms or complaints.      PAST MEDICAL HISTORY:   Past Medical History:   Diagnosis Date     Esophageal reflux        PAST SURGICAL HISTORY:   Past Surgical History:   Procedure Laterality Date     APPENDECTOMY       cystoscopy with pyeloscopy with removal of calculus.       PROSTATE SURGERY       ROTATOR CUFF REPAIR RT/LT       surgery testis exploration of undescended.  1985     TESTICLE SURGERY       VASECTOMY         FAMILY HISTORY: History reviewed. No pertinent family history.    SOCIAL HISTORY:   Social History     Socioeconomic History     Marital status:      Spouse name: None     Number of children: None     Years of education: None     Highest education level: None   Tobacco Use     Smoking status: Never     Smokeless tobacco: Never   Substance and Sexual Activity     Alcohol use: Yes     Comment: 2 x per week     Drug use: No     Sexual activity: Never       Review Of Systems:  Skin: No rash, pruritis, or skin pigmentation  Eyes: No changes in vision  Ears/Nose/Throat: No changes in hearing, no nosebleeds  Respiratory: No shortness of breath, dyspnea on exertion, cough, or hemoptysis  Cardiovascular: No chest pain or palpitations  Gastrointestinal: No diarrhea or constipation. No abdominal pain. No hematochezia  Genitourinary: see HPI  Musculoskeletal: No pain or swelling of joints, normal range of motion  Neurologic: No weakness or tremors  Psychiatric: No recent changes in memory or mood  Hematologic/Lymphatic/Immunologic: No easy bruising or enlarged lymph nodes  Endocrine: No weight gain or loss      PHYSICAL EXAM:    /74   Pulse 96   Ht 1.753 m (5'  "9\")   Wt 81.2 kg (179 lb)   SpO2 98%   BMI 26.43 kg/m      Constitutional: Well developed. Conversant and in no acute distress  Eyes: Anicteric sclera, conjunctiva clear, normal extraocular movements  ENT: Normocephalic and atraumatic,   Skin: Warm and dry. No rashes or lesions  Cardiac: No peripheral edema  Back/Flank: Not done  CNS/PNS: Normal musculature and movements, moves all extremities normally  Respiratory: Normal non-labored breathing  Abdomen: Soft nontender and nondistended  Peripheral Vascular: No peripheral edema  Mental Status/Psych: Alert and Oriented x 3. Normal mood and affect    Penis: Not done  Scrotal Skin: Not done  Testicles: Not done  Epididymis: Not done  Digital Rectal Exam: The prostate is moderately enlarged, benign and symmetric to palpation    Cystoscopy: Not done    Imaging: None    Urinalysis: UA RESULTS:  Recent Labs   Lab Test 04/26/18  1007   COLOR Yellow   APPEARANCE Clear   URINEGLC Negative   URINEBILI Negative   URINEKETONE Negative   SG 1.020   UBLD Negative   URINEPH 6.0   PROTEIN Negative   UROBILINOGEN 0.2   NITRITE Negative   LEUKEST Negative       PSA: 9.2    Post Void Residual:     Other labs: None today      IMPRESSION:  Low risk prostate cancer  Rising PSA    PLAN:  Unfortunately, his PSA has risen today.  This could be a false elevation or could be due to the cancer.  I recommended that we follow the PSA very closely.  I will see him back again in 1 month for PSA recheck.      Kamlesh Henry M.D.            "

## 2023-03-09 NOTE — LETTER
3/9/2023       RE: Agustin Nolan  [address] [facility] [address]     Dear Colleague,    Thank you for referring your patient, Agustin Nolan, to the [facility] at [facility]. Please see a copy of my visit note below.    Office Visit Note  [facility]  [phone]    UROLOGIC DIAGNOSES:   Low risk prostate cancer    CURRENT INTERVENTIONS:   Active surveillance  Prostate biopsy x2  MRI prostate x2    HISTORY:   Agustin returns to clinic today for continued monitoring of his prostate cancer.  The PSA today is up at 9.2.  He has no new urinary symptoms or complaints.      PAST MEDICAL HISTORY:   Past Medical History:   Diagnosis Date     Esophageal reflux        PAST SURGICAL HISTORY:   Past Surgical History:   Procedure Laterality Date     APPENDECTOMY       cystoscopy with pyeloscopy with removal of calculus.       PROSTATE SURGERY       ROTATOR CUFF REPAIR RT/LT       surgery testis exploration of undescended.  1985     TESTICLE SURGERY       VASECTOMY         FAMILY HISTORY: History reviewed. No pertinent family history.    SOCIAL HISTORY:   Social History     Socioeconomic History     Marital status:      Spouse name: None     Number of children: None     Years of education: None     Highest education level: None   Tobacco Use     Smoking status: Never     Smokeless tobacco: Never   Substance and Sexual Activity     Alcohol use: Yes     Comment: 2 x per week     Drug use: No     Sexual activity: Never       Review Of Systems:  Skin: No rash, pruritis, or skin pigmentation  Eyes: No changes in vision  Ears/Nose/Throat: No changes in hearing, no nosebleeds  Respiratory: No shortness of breath, dyspnea on exertion, cough, or hemoptysis  Cardiovascular: No chest pain or palpitations  Gastrointestinal: No diarrhea or constipation. No abdominal pain. No hematochezia  Genitourinary: see HPI  Musculoskeletal: No pain or  "swelling of joints, normal range of motion  Neurologic: No weakness or tremors  Psychiatric: No recent changes in memory or mood  Hematologic/Lymphatic/Immunologic: No easy bruising or enlarged lymph nodes  Endocrine: No weight gain or loss      PHYSICAL EXAM:    /74   Pulse 96   Ht 1.753 m (5' 9\")   Wt 81.2 kg (179 lb)   SpO2 98%   BMI 26.43 kg/m      Constitutional: Well developed. Conversant and in no acute distress  Eyes: Anicteric sclera, conjunctiva clear, normal extraocular movements  ENT: Normocephalic and atraumatic,   Skin: Warm and dry. No rashes or lesions  Cardiac: No peripheral edema  Back/Flank: Not done  CNS/PNS: Normal musculature and movements, moves all extremities normally  Respiratory: Normal non-labored breathing  Abdomen: Soft nontender and nondistended  Peripheral Vascular: No peripheral edema  Mental Status/Psych: Alert and Oriented x 3. Normal mood and affect    Penis: Not done  Scrotal Skin: Not done  Testicles: Not done  Epididymis: Not done  Digital Rectal Exam: The prostate is moderately enlarged, benign and symmetric to palpation    Cystoscopy: Not done    Imaging: None    Urinalysis: UA RESULTS:  Recent Labs   Lab Test 04/26/18  1007   COLOR Yellow   APPEARANCE Clear   URINEGLC Negative   URINEBILI Negative   URINEKETONE Negative   SG 1.020   UBLD Negative   URINEPH 6.0   PROTEIN Negative   UROBILINOGEN 0.2   NITRITE Negative   LEUKEST Negative     PSA: 9.2    Post Void Residual:     Other labs: None today    IMPRESSION:  Low risk prostate cancer  Rising PSA    PLAN:  Unfortunately, his PSA has risen today.  This could be a false elevation or could be due to the cancer.  I recommended that we follow the PSA very closely.  I will see him back again in 1 month for PSA recheck.      Kamlesh Henry M.D.              "

## 2023-04-05 DIAGNOSIS — E78.5 HYPERLIPIDEMIA LDL GOAL <100: ICD-10-CM

## 2023-04-05 RX ORDER — PRAVASTATIN SODIUM 40 MG
40 TABLET ORAL DAILY
Qty: 90 TABLET | Refills: 0 | Status: SHIPPED | OUTPATIENT
Start: 2023-04-05 | End: 2023-07-05

## 2023-04-05 NOTE — TELEPHONE ENCOUNTER
Medication requested: pravastatin (PRAVACHOL) 40 MG tablet  Last office visit: 4/27/22  [facility] appointments: 5/2/23  Medication last refilled: 10/10/22; 90 + 1 refill  Last qualifying labs:   Component      Latest Ref Rng 4/27/2022   Cholesterol      <200 mg/dL 163    Triglycerides      <150 mg/dL 197 (H)    HDL Cholesterol      >=40 mg/dL 37 (L)    LDL Cholesterol Calculated      <=100 mg/dL 87    Non HDL Cholesterol      <130 mg/dL 126    Patient Fasting > 8hrs? No      Medication is being filled for 1 time refill only due to:  Patient needs labs lipid panel.    Sagar LINN, RN  04/05/23 3:34 PM

## 2023-04-07 DIAGNOSIS — E11.9 DIABETES MELLITUS, TYPE 2 (H): ICD-10-CM

## 2023-04-07 RX ORDER — BLOOD SUGAR DIAGNOSTIC
1 STRIP MISCELLANEOUS DAILY
Qty: 100 STRIP | Refills: 1 | Status: SHIPPED | OUTPATIENT
Start: 2023-04-07 | End: 2023-12-11

## 2023-04-07 NOTE — TELEPHONE ENCOUNTER
Medication requested: ACCU-CHEK GUIDE test strip  Last office visit: 4/27/22  [facility] appointments: 5/2/23  Medication last refilled: 10/12/22; 100 + 1 refill  Last qualifying labs:   Component      Latest Ref Rng 4/27/2022   Hemoglobin A1C      0.0 - 5.6 % 7.6 (H)      Prescription approved per [facility] Refill Protocol.    Sagar LINN, RN  04/07/23 10:25 AM

## 2023-04-20 ENCOUNTER — OFFICE VISIT (OUTPATIENT)
Dept: UROLOGY | Facility: CLINIC | Age: 78
End: 2023-04-20
Payer: MEDICARE

## 2023-04-20 VITALS
BODY MASS INDEX: 26.81 KG/M2 | DIASTOLIC BLOOD PRESSURE: 84 MMHG | SYSTOLIC BLOOD PRESSURE: 114 MMHG | WEIGHT: 181 LBS | HEIGHT: 69 IN

## 2023-04-20 DIAGNOSIS — C61 PROSTATE CANCER (H): Primary | ICD-10-CM

## 2023-04-20 LAB
ALBUMIN UR-MCNC: 30 MG/DL
APPEARANCE UR: CLEAR
BILIRUB UR QL STRIP: NEGATIVE
COLOR UR AUTO: YELLOW
GLUCOSE UR STRIP-MCNC: 500 MG/DL
HGB UR QL STRIP: NEGATIVE
KETONES UR STRIP-MCNC: ABNORMAL MG/DL
LEUKOCYTE ESTERASE UR QL STRIP: NEGATIVE
NITRATE UR QL: NEGATIVE
PH UR STRIP: 5.5 [PH] (ref 5–7)
PSA SERPL-MCNC: 8.4 UG/L (ref 0–4)
RESIDUAL VOLUME (RV) (EXTERNAL): 29
SP GR UR STRIP: >=1.03 (ref 1–1.03)
UROBILINOGEN UR STRIP-ACNC: 0.2 E.U./DL

## 2023-04-20 PROCEDURE — 51798 US URINE CAPACITY MEASURE: CPT | Performed by: UROLOGY

## 2023-04-20 PROCEDURE — 99213 OFFICE O/P EST LOW 20 MIN: CPT | Mod: 25 | Performed by: UROLOGY

## 2023-04-20 PROCEDURE — 81003 URINALYSIS AUTO W/O SCOPE: CPT | Mod: QW | Performed by: UROLOGY

## 2023-04-20 PROCEDURE — 36415 COLL VENOUS BLD VENIPUNCTURE: CPT | Performed by: UROLOGY

## 2023-04-20 PROCEDURE — 84153 ASSAY OF PSA TOTAL: CPT | Performed by: UROLOGY

## 2023-04-20 ASSESSMENT — PAIN SCALES - GENERAL: PAINLEVEL: NO PAIN (0)

## 2023-04-20 NOTE — PROGRESS NOTES
Office Visit Note  [facility]  [phone]    UROLOGIC DIAGNOSES:   Low risk prostate cancer    CURRENT INTERVENTIONS:   Active surveillance  Prostate biopsy x2  MRI prostate x2    HISTORY:   Agustin returns to clinic today for PSA recheck.  His PSA has improved today somewhat at 8.4.      PAST MEDICAL HISTORY:   Past Medical History:   Diagnosis Date     Esophageal reflux        PAST SURGICAL HISTORY:   Past Surgical History:   Procedure Laterality Date     APPENDECTOMY       cystoscopy with pyeloscopy with removal of calculus.       PROSTATE SURGERY       ROTATOR CUFF REPAIR RT/LT       surgery testis exploration of undescended.  1985     TESTICLE SURGERY       VASECTOMY         FAMILY HISTORY: No family history on file.    SOCIAL HISTORY:   Social History     Socioeconomic History     Marital status:    Tobacco Use     Smoking status: Never     Smokeless tobacco: Never   Substance and Sexual Activity     Alcohol use: Yes     Comment: 2 x per week     Drug use: No     Sexual activity: Never       Review Of Systems:  Skin: No rash, pruritis, or skin pigmentation  Eyes: No changes in vision  Ears/Nose/Throat: No changes in hearing, no nosebleeds  Respiratory: No shortness of breath, dyspnea on exertion, cough, or hemoptysis  Cardiovascular: No chest pain or palpitations  Gastrointestinal: No diarrhea or constipation. No abdominal pain. No hematochezia  Genitourinary: see HPI  Musculoskeletal: No pain or swelling of joints, normal range of motion  Neurologic: No weakness or tremors  Psychiatric: No recent changes in memory or mood  Hematologic/Lymphatic/Immunologic: No easy bruising or enlarged lymph nodes  Endocrine: No weight gain or loss      PHYSICAL EXAM:    There were no vitals taken for this visit.    Constitutional: Well developed. Conversant and in no acute distress  Eyes: Anicteric sclera, conjunctiva clear, normal extraocular movements  ENT: Normocephalic and atraumatic,   Skin: Warm  and dry. No rashes or lesions  Cardiac: No peripheral edema  Back/Flank: Not done  CNS/PNS: Normal musculature and movements, moves all extremities normally  Respiratory: Normal non-labored breathing  Abdomen: Soft nontender and nondistended  Peripheral Vascular: No peripheral edema  Mental Status/Psych: Alert and Oriented x 3. Normal mood and affect    Penis: Not done  Scrotal Skin: Not done  Testicles: Not done  Epididymis: Not done  Digital Rectal Exam:     Cystoscopy: Not done    Imaging: None    Urinalysis: UA RESULTS:  Recent Labs   Lab Test 04/26/18  1007   COLOR Yellow   APPEARANCE Clear   URINEGLC Negative   URINEBILI Negative   URINEKETONE Negative   SG 1.020   UBLD Negative   URINEPH 6.0   PROTEIN Negative   UROBILINOGEN 0.2   NITRITE Negative   LEUKEST Negative       PSA: 8.4    Post Void Residual:     Other labs: None today      IMPRESSION:  Low risk prostate cancer    PLAN:  His PSA has improved somewhat.  We discussed options of continue to follow PSA closely versus repeat MRI or prostate biopsy.  He wishes to follow the PSA closely and would like to come back again in 1 month to recheck the PSA.  I will see him back for this.      Kamlesh Henry M.D.

## 2023-04-20 NOTE — NURSING NOTE
Chief Complaint   Patient presents with     Prostate Cancer     1 month follow up with PSA     PVR: 29 mL    Nirmala Edward, CMA

## 2023-04-20 NOTE — LETTER
4/20/2023       RE: Agustin Nolan  [address] [facility] [address]     Dear Colleague,    Thank you for referring your patient, Agustin Nolan, to the [facility] at [facility]. Please see a copy of my visit note below.    Office Visit Note  [facility]  [phone]    UROLOGIC DIAGNOSES:   Low risk prostate cancer    CURRENT INTERVENTIONS:   Active surveillance  Prostate biopsy x2  MRI prostate x2    HISTORY:   Agustin returns to clinic today for PSA recheck.  His PSA has improved today somewhat at 8.4.      PAST MEDICAL HISTORY:   Past Medical History:   Diagnosis Date    Esophageal reflux        PAST SURGICAL HISTORY:   Past Surgical History:   Procedure Laterality Date    APPENDECTOMY      cystoscopy with pyeloscopy with removal of calculus.      PROSTATE SURGERY      ROTATOR CUFF REPAIR RT/LT      surgery testis exploration of undescended.  1985    TESTICLE SURGERY      VASECTOMY         FAMILY HISTORY: No family history on file.    SOCIAL HISTORY:   Social History     Socioeconomic History    Marital status:    Tobacco Use    Smoking status: Never    Smokeless tobacco: Never   Substance and Sexual Activity    Alcohol use: Yes     Comment: 2 x per week    Drug use: No    Sexual activity: Never       Review Of Systems:  Skin: No rash, pruritis, or skin pigmentation  Eyes: No changes in vision  Ears/Nose/Throat: No changes in hearing, no nosebleeds  Respiratory: No shortness of breath, dyspnea on exertion, cough, or hemoptysis  Cardiovascular: No chest pain or palpitations  Gastrointestinal: No diarrhea or constipation. No abdominal pain. No hematochezia  Genitourinary: see HPI  Musculoskeletal: No pain or swelling of joints, normal range of motion  Neurologic: No weakness or tremors  Psychiatric: No recent changes in memory or mood  Hematologic/Lymphatic/Immunologic: No easy bruising or enlarged lymph  nodes  Endocrine: No weight gain or loss      PHYSICAL EXAM:    There were no vitals taken for this visit.    Constitutional: Well developed. Conversant and in no acute distress  Eyes: Anicteric sclera, conjunctiva clear, normal extraocular movements  ENT: Normocephalic and atraumatic,   Skin: Warm and dry. No rashes or lesions  Cardiac: No peripheral edema  Back/Flank: Not done  CNS/PNS: Normal musculature and movements, moves all extremities normally  Respiratory: Normal non-labored breathing  Abdomen: Soft nontender and nondistended  Peripheral Vascular: No peripheral edema  Mental Status/Psych: Alert and Oriented x 3. Normal mood and affect    Penis: Not done  Scrotal Skin: Not done  Testicles: Not done  Epididymis: Not done  Digital Rectal Exam:     Cystoscopy: Not done    Imaging: None    Urinalysis: UA RESULTS:  Recent Labs   Lab Test 04/26/18  1007   COLOR Yellow   APPEARANCE Clear   URINEGLC Negative   URINEBILI Negative   URINEKETONE Negative   SG 1.020   UBLD Negative   URINEPH 6.0   PROTEIN Negative   UROBILINOGEN 0.2   NITRITE Negative   LEUKEST Negative       PSA: 8.4    Post Void Residual:     Other labs: None today      IMPRESSION:  Low risk prostate cancer    PLAN:  His PSA has improved somewhat.  We discussed options of continue to follow PSA closely versus repeat MRI or prostate biopsy.  He wishes to follow the PSA closely and would like to come back again in 1 month to recheck the PSA.  I will see him back for this.      Kamlesh Henry M.D.

## 2023-04-25 ASSESSMENT — ENCOUNTER SYMPTOMS
FEVER: 0
CHILLS: 0
HEADACHES: 0
CONSTIPATION: 0
JOINT SWELLING: 0
HEMATURIA: 0
DYSURIA: 0
PALPITATIONS: 0
EYE PAIN: 0
COUGH: 1
SHORTNESS OF BREATH: 0
SORE THROAT: 0
ABDOMINAL PAIN: 0
FREQUENCY: 0
DIARRHEA: 0
NERVOUS/ANXIOUS: 0
NAUSEA: 0
WEAKNESS: 0
DIZZINESS: 0
ARTHRALGIAS: 1
HEMATOCHEZIA: 0
PARESTHESIAS: 0
HEARTBURN: 0
MYALGIAS: 1

## 2023-04-25 ASSESSMENT — ACTIVITIES OF DAILY LIVING (ADL): CURRENT_FUNCTION: NO ASSISTANCE NEEDED

## 2023-05-02 ENCOUNTER — OFFICE VISIT (OUTPATIENT)
Dept: FAMILY MEDICINE | Facility: CLINIC | Age: 78
End: 2023-05-02
Payer: MEDICARE

## 2023-05-02 VITALS
OXYGEN SATURATION: 94 % | WEIGHT: 190 LBS | TEMPERATURE: 98 F | BODY MASS INDEX: 28.14 KG/M2 | HEART RATE: 72 BPM | RESPIRATION RATE: 15 BRPM | DIASTOLIC BLOOD PRESSURE: 88 MMHG | HEIGHT: 69 IN | SYSTOLIC BLOOD PRESSURE: 135 MMHG

## 2023-05-02 DIAGNOSIS — E78.5 HYPERLIPIDEMIA LDL GOAL <130: ICD-10-CM

## 2023-05-02 DIAGNOSIS — Z97.4 WEARS HEARING AID IN BOTH EARS: ICD-10-CM

## 2023-05-02 DIAGNOSIS — E11.9 TYPE 2 DIABETES MELLITUS WITHOUT COMPLICATION, WITHOUT LONG-TERM CURRENT USE OF INSULIN (H): ICD-10-CM

## 2023-05-02 DIAGNOSIS — Z00.00 MEDICARE ANNUAL WELLNESS VISIT, SUBSEQUENT: Primary | ICD-10-CM

## 2023-05-02 LAB
ALT SERPL W P-5'-P-CCNC: 32 U/L (ref 10–50)
ANION GAP SERPL CALCULATED.3IONS-SCNC: 16 MMOL/L (ref 7–15)
BUN SERPL-MCNC: 13 MG/DL (ref 8–23)
CALCIUM SERPL-MCNC: 9.9 MG/DL (ref 8.8–10.2)
CHLORIDE SERPL-SCNC: 99 MMOL/L (ref 98–107)
CHOLEST SERPL-MCNC: 187 MG/DL
CREAT SERPL-MCNC: 1.02 MG/DL (ref 0.67–1.17)
CREAT UR-MCNC: 87.9 MG/DL
DEPRECATED HCO3 PLAS-SCNC: 25 MMOL/L (ref 22–29)
GFR SERPL CREATININE-BSD FRML MDRD: 77 ML/MIN/1.73M2
GLUCOSE SERPL-MCNC: 134 MG/DL (ref 70–99)
HBA1C MFR BLD: 7.3 % (ref 0–5.6)
HDLC SERPL-MCNC: 42 MG/DL
LDLC SERPL CALC-MCNC: 108 MG/DL
MICROALBUMIN UR-MCNC: 26.1 MG/L
MICROALBUMIN/CREAT UR: 29.69 MG/G CR (ref 0–17)
NONHDLC SERPL-MCNC: 145 MG/DL
POTASSIUM SERPL-SCNC: 3.9 MMOL/L (ref 3.4–5.3)
SODIUM SERPL-SCNC: 140 MMOL/L (ref 136–145)
TRIGL SERPL-MCNC: 186 MG/DL
TSH SERPL DL<=0.005 MIU/L-ACNC: 4.07 UIU/ML (ref 0.3–4.2)

## 2023-05-02 PROCEDURE — 80061 LIPID PANEL: CPT | Mod: ORL | Performed by: FAMILY MEDICINE

## 2023-05-02 PROCEDURE — 82570 ASSAY OF URINE CREATININE: CPT | Mod: ORL | Performed by: FAMILY MEDICINE

## 2023-05-02 PROCEDURE — 84460 ALANINE AMINO (ALT) (SGPT): CPT | Mod: ORL | Performed by: FAMILY MEDICINE

## 2023-05-02 PROCEDURE — 80048 BASIC METABOLIC PNL TOTAL CA: CPT | Mod: ORL | Performed by: FAMILY MEDICINE

## 2023-05-02 PROCEDURE — 84443 ASSAY THYROID STIM HORMONE: CPT | Mod: ORL | Performed by: FAMILY MEDICINE

## 2023-05-02 NOTE — PROGRESS NOTES
"CHIEF COMPLAINT: Medicare Annual Wellness Exam      HISTORY OF PRESENT ILLNESS:  Agustin Nolan is a 77 year old male who presents for an annual wellness visit.  Overall, he is doing well. He does not wear glasses and eye care is up to date. No eye or vision changes. No glaucoma or macular degeneration. He states that his hearing \"always seems to be getting worse.\" He has been following with audiology for this and wears hearing aids in both ears. He feels that they are working well and he is depending on them more and more. His dental care is up to date. His BP is 135/88 today. Body mass index is 27.95 kg/m . From an immunization standpoint, Agustin has not received the COVID vaccines and has no intention of doing so. He has had both his pneumococcal vaccines.  He had a tetanus booster in 03/2012, so he is due for that.  He should get that at his pharmacy.  He had the initial shingles vaccine in 2014, and is holding off on the subsequent ones at this point..     He had colon cancer screening via Cologuard on 4/22/2021, next due 2024. His PSA tumor marker was 8.40 on 4/20/2023. He follows with Dr. Henry in urology for this.     Agustin has a history of type 2 diabetes mellitus and is currently taking metformin 500 mg twice daily. His most recent A1c was 7.6% on 4/27/2022. His BP is 135/88 today and he is not currently taking any antihypertensive medications. His most recent LDL was 87 on 4/27/2022 and he is currently taking pravastatin 40 mg daily. He is on aspirin and does not smoke. He notes that his peripheral neuropathy seems to be progressing to cover more of his feet.      Agustin scraped his right anterior shin yesterday while working on his car. He would like me to look at this today.     Agustin notes a skin lesion on his chest which he is concerned could be a melanoma as it has been getting darker. He has access to liquid nitrogen and feels confident in performing cryotherapy on himself as he has done so in the " "past.     MEDICARE PREVENTATIVE VISIT:  1. No problems with ADLs  2. No falls  3. No depression  4. Not concerned about memory loss, though he does note some dependence on his phone to remember things such as singers and songs. Mini-Cog 5/5.     FAMILY, SOCIAL AND PAST SURGICAL HISTORIES: Updated      MEDICATIONS:   Carefully Reviewed      REVIEW OF SYSTEMS:  10-point review of systems negative unless otherwise stated in the HPI.       OBJECTIVE:    EXAM:    GENERAL: He is in no distress.  Affect is upbeat.   Alert and oriented x3  /88 (BP Location: Left arm, Patient Position: Sitting, Cuff Size: Adult Regular)   Pulse 72   Temp 98  F (36.7  C) (Skin)   Resp 15   Ht 1.756 m (5' 9.13\")   Wt 86.2 kg (190 lb)   SpO2 94%   BMI 27.95 kg/m    HEENT:  Head is normocephalic, atraumatic. Ears clear bilaterally. No pain with palpation of the frontal or maxillary sinuses.  Eyes are grossly normal.  Nose is free of any congestion or discharge.  Teeth in good repair.  Mucous membranes are moist.  Throat looks benign.  Neck is supple without adenopathy or thyromegaly.  No carotid bruits are heard, no JVD.   BACK:  Smooth and straight.  No pain to percussion.  No CVA tenderness.   LUNGS:  Clear to auscultation bilaterally.   HEART:  Regular rate and rhythm without murmur.   EXTREMITIES:  Ankles free of any edema.   SKIN:  Warm and dry.   FEET: Neuropathy present on the tops and bottoms of all 10 toes, to the metatarsal heads      LABORATORY DATA: Labs, pending.       ASSESSMENT AND PLAN:   1. Medicare Annual Wellness Exam: Up to date with healthcare maintenance strategies.  2. Wears hearing aids in both ears: Added to problem list.  3. Type 2 diabetes, at optimal diabetic care. A1c is 7.3%. Blood pressure is less than 140/90. LDL was 87 and he is on pravastatin 40 mg daily. He is on aspirin and does not smoke.  Albumin Random Urine Quantitative with Creat Ratio, A1c, BMP, lipid profile, TSH ordered. Foot exam " completed today.   4. Hyperlipidemia: Fasting. Lipid profile, ALT ordered.   5. Follow up in 1 year or call if there are any questions or concerns.      I, Wendy Yang, am serving as a scribe to document services personally performed by Dr. Vicente Tomlin, based on data collection and the provider's statements to me.     I, Dr. Vicente Tomlin, have interviewed and examined this patient, and I have thoroughly reviewed and edited this note and agree with its contents.

## 2023-05-02 NOTE — NURSING NOTE
"77 year old  Chief Complaint   Patient presents with     Physical     No other concerns       Blood pressure 135/88, pulse 72, temperature 98  F (36.7  C), temperature source Skin, resp. rate 15, height 1.756 m (5' 9.13\"), weight 86.2 kg (190 lb), SpO2 94 %. Body mass index is 27.95 kg/m .  Patient Active Problem List   Diagnosis     Hyperlipidemia LDL goal <130     Medicare annual wellness visit, subsequent     Idiopathic peripheral neuropathy     Wears hearing aid     Diabetes mellitus, type 2 (H)       Wt Readings from Last 2 Encounters:   05/02/23 86.2 kg (190 lb)   04/20/23 82.1 kg (181 lb)     BP Readings from Last 3 Encounters:   05/02/23 135/88   04/20/23 114/84   03/09/23 136/74         Current Outpatient Medications   Medication     ACCU-CHEK GUIDE test strip     aspirin (ASA) 81 MG EC tablet     blood glucose monitoring (ACCU-CHEK JHON PLUS) meter device kit     Cholecalciferol (VITAMIN D) 2000 UNITS tablet     metFORMIN (GLUCOPHAGE XR) 500 MG 24 hr tablet     omeprazole (PRILOSEC) 20 MG DR capsule     pravastatin (PRAVACHOL) 40 MG tablet     Coenzyme Q10 (COQ10) 200 MG CAPS     No current facility-administered medications for this visit.       Social History     Tobacco Use     Smoking status: Never     Smokeless tobacco: Never   Substance Use Topics     Alcohol use: Yes     Alcohol/week: 1.0 standard drink of alcohol     Types: 1 Standard drinks or equivalent per week     Drug use: No       Health Maintenance Due   Topic Date Due     ADVANCE CARE PLANNING  Never done     COVID-19 Vaccine (1) Never done     ZOSTER IMMUNIZATION (2 of 3) 03/24/2014     DTAP/TDAP/TD IMMUNIZATION (2 - Td or Tdap) 03/08/2022     INFLUENZA VACCINE (1) 09/01/2022     A1C  10/27/2022     BMP  04/27/2023     LIPID  04/27/2023     MICROALBUMIN  04/27/2023     DIABETIC FOOT EXAM  04/27/2023     MEDICARE ANNUAL WELLNESS VISIT  04/27/2023       No results found for: PAP      May 2, 2023 12:52 PM    "

## 2023-05-04 RX ORDER — METFORMIN HCL 500 MG
TABLET, EXTENDED RELEASE 24 HR ORAL
Qty: 180 TABLET | Refills: 1 | Status: SHIPPED | OUTPATIENT
Start: 2023-05-04 | End: 2024-02-15

## 2023-05-04 NOTE — TELEPHONE ENCOUNTER
Last visit 5/3/23, no future visit  Prescription approved per [facility] Refill Protocol.  Jenna Scanlon RN  [facility]

## 2023-05-25 ENCOUNTER — OFFICE VISIT (OUTPATIENT)
Dept: UROLOGY | Facility: CLINIC | Age: 78
End: 2023-05-25
Payer: MEDICARE

## 2023-05-25 VITALS — HEART RATE: 89 BPM | SYSTOLIC BLOOD PRESSURE: 135 MMHG | OXYGEN SATURATION: 96 % | DIASTOLIC BLOOD PRESSURE: 68 MMHG

## 2023-05-25 DIAGNOSIS — C61 PROSTATE CANCER (H): Primary | ICD-10-CM

## 2023-05-25 LAB — PSA SERPL-MCNC: 8.8 UG/L (ref 0–4)

## 2023-05-25 PROCEDURE — 84153 ASSAY OF PSA TOTAL: CPT | Performed by: UROLOGY

## 2023-05-25 PROCEDURE — 99213 OFFICE O/P EST LOW 20 MIN: CPT | Performed by: UROLOGY

## 2023-05-25 PROCEDURE — 36415 COLL VENOUS BLD VENIPUNCTURE: CPT | Performed by: UROLOGY

## 2023-05-25 ASSESSMENT — PAIN SCALES - GENERAL: PAINLEVEL: NO PAIN (0)

## 2023-05-25 NOTE — PROGRESS NOTES
Office Visit Note  [facility]  [phone]    UROLOGIC DIAGNOSES:   Low risk prostate cancer    CURRENT INTERVENTIONS:   Active surveillance  Prostate biopsy x2  MRI prostate x2    HISTORY:   Agustin returns to clinic today for PSA recheck.  His PSA is stable at 8.8.      PAST MEDICAL HISTORY:   Past Medical History:   Diagnosis Date     Esophageal reflux        PAST SURGICAL HISTORY:   Past Surgical History:   Procedure Laterality Date     APPENDECTOMY       cystoscopy with pyeloscopy with removal of calculus.       PROSTATE SURGERY       ROTATOR CUFF REPAIR RT/LT       surgery testis exploration of undescended.  1985     TESTICLE SURGERY       VASECTOMY         FAMILY HISTORY: No family history on file.    SOCIAL HISTORY:   Social History     Socioeconomic History     Marital status:      Spouse name: None     Number of children: None     Years of education: None     Highest education level: None   Tobacco Use     Smoking status: Never     Smokeless tobacco: Never   Substance and Sexual Activity     Alcohol use: Yes     Alcohol/week: 1.0 standard drink of alcohol     Types: 1 Standard drinks or equivalent per week     Drug use: No     Sexual activity: Never       Review Of Systems:  Skin: No rash, pruritis, or skin pigmentation  Eyes: No changes in vision  Ears/Nose/Throat: No changes in hearing, no nosebleeds  Respiratory: No shortness of breath, dyspnea on exertion, cough, or hemoptysis  Cardiovascular: No chest pain or palpitations  Gastrointestinal: No diarrhea or constipation. No abdominal pain. No hematochezia  Genitourinary: see HPI  Musculoskeletal: No pain or swelling of joints, normal range of motion  Neurologic: No weakness or tremors  Psychiatric: No recent changes in memory or mood  Hematologic/Lymphatic/Immunologic: No easy bruising or enlarged lymph nodes  Endocrine: No weight gain or loss      PHYSICAL EXAM:    /68   Pulse 89   SpO2 96%     Constitutional: Well  developed. Conversant and in no acute distress  Eyes: Anicteric sclera, conjunctiva clear, normal extraocular movements  ENT: Normocephalic and atraumatic,   Skin: Warm and dry. No rashes or lesions  Cardiac: No peripheral edema  Back/Flank: Not done  CNS/PNS: Normal musculature and movements, moves all extremities normally  Respiratory: Normal non-labored breathing  Abdomen: Soft nontender and nondistended  Peripheral Vascular: No peripheral edema  Mental Status/Psych: Alert and Oriented x 3. Normal mood and affect    Penis: Not done  Scrotal Skin: Not done  Testicles: Not done  Epididymis: Not done  Digital Rectal Exam:     Cystoscopy: Not done    Imaging: None    Urinalysis: UA RESULTS:  Recent Labs   Lab Test 04/20/23  1054   COLOR Yellow   APPEARANCE Clear   URINEGLC 500*   URINEBILI Negative   URINEKETONE Trace*   SG >=1.030   UBLD Negative   URINEPH 5.5   PROTEIN 30*   UROBILINOGEN 0.2   NITRITE Negative   LEUKEST Negative       PSA: 8.8    Post Void Residual:     Other labs: None today      IMPRESSION:  Low risk prostate cancer    PLAN:  His PSA has fluctuated a bit but overall remains stable.  We discussed options at this time of following the PSA versus repeating MRI of the prostate versus prostate biopsy versus treating his prostate cancer.  We will continue to follow the PSA closely.  I will see him back in 4 months for another PSA check.        Kamlesh Henry M.D.

## 2023-05-25 NOTE — LETTER
5/25/2023       RE: Agustin Nolan  [address] [facility] [address]     Dear Colleague,    Thank you for referring your patient, Agustin Nolan, to the [facility] at [facility]. Please see a copy of my visit note below.    Office Visit Note  [facility]  [phone]    UROLOGIC DIAGNOSES:   Low risk prostate cancer    CURRENT INTERVENTIONS:   Active surveillance  Prostate biopsy x2  MRI prostate x2    HISTORY:   Agustin returns to clinic today for PSA recheck.  His PSA is stable at 8.8.      PAST MEDICAL HISTORY:   Past Medical History:   Diagnosis Date    Esophageal reflux        PAST SURGICAL HISTORY:   Past Surgical History:   Procedure Laterality Date    APPENDECTOMY      cystoscopy with pyeloscopy with removal of calculus.      PROSTATE SURGERY      ROTATOR CUFF REPAIR RT/LT      surgery testis exploration of undescended.  1985    TESTICLE SURGERY      VASECTOMY         FAMILY HISTORY: No family history on file.    SOCIAL HISTORY:   Social History     Socioeconomic History    Marital status:      Spouse name: None    Number of children: None    Years of education: None    Highest education level: None   Tobacco Use    Smoking status: Never    Smokeless tobacco: Never   Substance and Sexual Activity    Alcohol use: Yes     Alcohol/week: 1.0 standard drink of alcohol     Types: 1 Standard drinks or equivalent per week    Drug use: No    Sexual activity: Never       Review Of Systems:  Skin: No rash, pruritis, or skin pigmentation  Eyes: No changes in vision  Ears/Nose/Throat: No changes in hearing, no nosebleeds  Respiratory: No shortness of breath, dyspnea on exertion, cough, or hemoptysis  Cardiovascular: No chest pain or palpitations  Gastrointestinal: No diarrhea or constipation. No abdominal pain. No hematochezia  Genitourinary: see HPI  Musculoskeletal: No pain or swelling of joints, normal range of  motion  Neurologic: No weakness or tremors  Psychiatric: No recent changes in memory or mood  Hematologic/Lymphatic/Immunologic: No easy bruising or enlarged lymph nodes  Endocrine: No weight gain or loss      PHYSICAL EXAM:    /68   Pulse 89   SpO2 96%     Constitutional: Well developed. Conversant and in no acute distress  Eyes: Anicteric sclera, conjunctiva clear, normal extraocular movements  ENT: Normocephalic and atraumatic,   Skin: Warm and dry. No rashes or lesions  Cardiac: No peripheral edema  Back/Flank: Not done  CNS/PNS: Normal musculature and movements, moves all extremities normally  Respiratory: Normal non-labored breathing  Abdomen: Soft nontender and nondistended  Peripheral Vascular: No peripheral edema  Mental Status/Psych: Alert and Oriented x 3. Normal mood and affect    Penis: Not done  Scrotal Skin: Not done  Testicles: Not done  Epididymis: Not done  Digital Rectal Exam:     Cystoscopy: Not done    Imaging: None    Urinalysis: UA RESULTS:  Recent Labs   Lab Test 04/20/23  1054   COLOR Yellow   APPEARANCE Clear   URINEGLC 500*   URINEBILI Negative   URINEKETONE Trace*   SG >=1.030   UBLD Negative   URINEPH 5.5   PROTEIN 30*   UROBILINOGEN 0.2   NITRITE Negative   LEUKEST Negative     PSA: 8.8    Post Void Residual:     Other labs: None today    IMPRESSION:  Low risk prostate cancer    PLAN:  His PSA has fluctuated a bit but overall remains stable.  We discussed options at this time of following the PSA versus repeating MRI of the prostate versus prostate biopsy versus treating his prostate cancer.  We will continue to follow the PSA closely.  I will see him back in 4 months for another PSA check.      Kamlesh Henry M.D.

## 2023-07-05 DIAGNOSIS — E78.5 HYPERLIPIDEMIA LDL GOAL <100: ICD-10-CM

## 2023-07-05 RX ORDER — PRAVASTATIN SODIUM 40 MG
40 TABLET ORAL DAILY
Qty: 90 TABLET | Refills: 3 | Status: SHIPPED | OUTPATIENT
Start: 2023-07-05 | End: 2024-06-24

## 2023-07-05 NOTE — TELEPHONE ENCOUNTER
Medication requested: pravastatin (PRAVACHOL) 40 MG tablet  Last office visit: 5/2/23  [facility] appointments: none  Medication last refilled: 4/5/23; 90 + 0 refills  Last qualifying labs:   Component      Latest Ref Rng 5/2/2023  1:43 PM   Cholesterol      <200 mg/dL 187    Triglycerides      <150 mg/dL 186 (H)    HDL Cholesterol      >=40 mg/dL 42    LDL Cholesterol Calculated      <=100 mg/dL 108 (H)    Non HDL Cholesterol      <130 mg/dL 145 (H)      Prescription approved per [facility] Refill Protocol.    Sagar LINN, RN  07/05/23 3:58 PM

## 2023-09-28 ENCOUNTER — OFFICE VISIT (OUTPATIENT)
Dept: UROLOGY | Facility: CLINIC | Age: 78
End: 2023-09-28
Payer: MEDICARE

## 2023-09-28 VITALS
HEIGHT: 69 IN | DIASTOLIC BLOOD PRESSURE: 92 MMHG | HEART RATE: 90 BPM | OXYGEN SATURATION: 96 % | SYSTOLIC BLOOD PRESSURE: 142 MMHG | WEIGHT: 180 LBS | BODY MASS INDEX: 26.66 KG/M2

## 2023-09-28 DIAGNOSIS — C61 PROSTATE CANCER (H): Primary | ICD-10-CM

## 2023-09-28 LAB — PSA SERPL-MCNC: 10.8 UG/L (ref 0–4)

## 2023-09-28 PROCEDURE — 99214 OFFICE O/P EST MOD 30 MIN: CPT | Performed by: UROLOGY

## 2023-09-28 PROCEDURE — 84153 ASSAY OF PSA TOTAL: CPT | Performed by: UROLOGY

## 2023-09-28 PROCEDURE — 36415 COLL VENOUS BLD VENIPUNCTURE: CPT | Performed by: UROLOGY

## 2023-09-28 ASSESSMENT — PAIN SCALES - GENERAL: PAINLEVEL: NO PAIN (0)

## 2023-09-28 NOTE — LETTER
9/28/2023       RE: Agustin Nolan  [address] [facility] [address]     Dear Colleague,    Thank you for referring your patient, Agustin Nolan, to the [facility] at [facility]. Please see a copy of my visit note below.    Office Visit Note  [facility]  [phone]    UROLOGIC DIAGNOSES:   Low risk prostate cancer    CURRENT INTERVENTIONS:   Active surveillance  Prostate biopsy x2  MRI prostate x2    HISTORY:   Agustin returns to clinic today for continued surveillance of his prostate cancer.  His PSA today has risen, currently at 10.8.  He continues to have no urinary symptoms or complaints.      PAST MEDICAL HISTORY:   Past Medical History:   Diagnosis Date    Esophageal reflux        PAST SURGICAL HISTORY:   Past Surgical History:   Procedure Laterality Date    APPENDECTOMY      cystoscopy with pyeloscopy with removal of calculus.      PROSTATE SURGERY      ROTATOR CUFF REPAIR RT/LT      surgery testis exploration of undescended.  1985    TESTICLE SURGERY      VASECTOMY         FAMILY HISTORY: No family history on file.    SOCIAL HISTORY:   Social History     Socioeconomic History    Marital status:    Tobacco Use    Smoking status: Never    Smokeless tobacco: Never   Substance and Sexual Activity    Alcohol use: Yes     Alcohol/week: 1.0 standard drink of alcohol     Types: 1 Standard drinks or equivalent per week    Drug use: No    Sexual activity: Never       Review Of Systems:  Skin: No rash, pruritis, or skin pigmentation  Eyes: No changes in vision  Ears/Nose/Throat: No changes in hearing, no nosebleeds  Respiratory: No shortness of breath, dyspnea on exertion, cough, or hemoptysis  Cardiovascular: No chest pain or palpitations  Gastrointestinal: No diarrhea or constipation. No abdominal pain. No hematochezia  Genitourinary: see HPI  Musculoskeletal: No pain or swelling of joints, normal range of  motion  Neurologic: No weakness or tremors  Psychiatric: No recent changes in memory or mood  Hematologic/Lymphatic/Immunologic: No easy bruising or enlarged lymph nodes  Endocrine: No weight gain or loss      PHYSICAL EXAM:    There were no vitals taken for this visit.    Constitutional: Well developed. Conversant and in no acute distress  Eyes: Anicteric sclera, conjunctiva clear, normal extraocular movements  ENT: Normocephalic and atraumatic,   Skin: Warm and dry. No rashes or lesions  Cardiac: No peripheral edema  Back/Flank: Not done  CNS/PNS: Normal musculature and movements, moves all extremities normally  Respiratory: Normal non-labored breathing  Abdomen: Soft nontender and nondistended  Peripheral Vascular: No peripheral edema  Mental Status/Psych: Alert and Oriented x 3. Normal mood and affect    Penis: Not done  Scrotal Skin: Not done  Testicles: Not done  Epididymis: Not done  Digital Rectal Exam:     Cystoscopy: Not done    Imaging: None    Urinalysis: UA RESULTS:  Recent Labs   Lab Test 04/20/23  1054   COLOR Yellow   APPEARANCE Clear   URINEGLC 500*   URINEBILI Negative   URINEKETONE Trace*   SG >=1.030   UBLD Negative   URINEPH 5.5   PROTEIN 30*   UROBILINOGEN 0.2   NITRITE Negative   LEUKEST Negative       PSA: 10.8    Post Void Residual:     Other labs: None today      IMPRESSION:  Prostate cancer  Rising PSA    PLAN:  His PSA has risen today.  In light of this we discussed options.    We discussed the option of following the PSA closely and checking it again in 1 to 2 months    We discussed the option of proceeding with treatment for his prostate cancer, either robotic prostatectomy or radiotherapy.    We discussed the option of proceeding with another MRI of the prostate and potentially another prostate biopsy in order to reevaluate the cancer.    He wishes to proceed with MRI of the prostate.  We will go over the MRI results together when it is completed.      Kamlesh Henry M.D.

## 2023-09-28 NOTE — PROGRESS NOTES
Office Visit Note  [facility]  [phone]    UROLOGIC DIAGNOSES:   Low risk prostate cancer    CURRENT INTERVENTIONS:   Active surveillance  Prostate biopsy x2  MRI prostate x2    HISTORY:   Agustin returns to clinic today for continued surveillance of his prostate cancer.  His PSA today has risen, currently at 10.8.  He continues to have no urinary symptoms or complaints.      PAST MEDICAL HISTORY:   Past Medical History:   Diagnosis Date    Esophageal reflux        PAST SURGICAL HISTORY:   Past Surgical History:   Procedure Laterality Date    APPENDECTOMY      cystoscopy with pyeloscopy with removal of calculus.      PROSTATE SURGERY      ROTATOR CUFF REPAIR RT/LT      surgery testis exploration of undescended.  1985    TESTICLE SURGERY      VASECTOMY         FAMILY HISTORY: No family history on file.    SOCIAL HISTORY:   Social History     Socioeconomic History    Marital status:    Tobacco Use    Smoking status: Never    Smokeless tobacco: Never   Substance and Sexual Activity    Alcohol use: Yes     Alcohol/week: 1.0 standard drink of alcohol     Types: 1 Standard drinks or equivalent per week    Drug use: No    Sexual activity: Never       Review Of Systems:  Skin: No rash, pruritis, or skin pigmentation  Eyes: No changes in vision  Ears/Nose/Throat: No changes in hearing, no nosebleeds  Respiratory: No shortness of breath, dyspnea on exertion, cough, or hemoptysis  Cardiovascular: No chest pain or palpitations  Gastrointestinal: No diarrhea or constipation. No abdominal pain. No hematochezia  Genitourinary: see HPI  Musculoskeletal: No pain or swelling of joints, normal range of motion  Neurologic: No weakness or tremors  Psychiatric: No recent changes in memory or mood  Hematologic/Lymphatic/Immunologic: No easy bruising or enlarged lymph nodes  Endocrine: No weight gain or loss      PHYSICAL EXAM:    There were no vitals taken for this visit.    Constitutional: Well developed.  Conversant and in no acute distress  Eyes: Anicteric sclera, conjunctiva clear, normal extraocular movements  ENT: Normocephalic and atraumatic,   Skin: Warm and dry. No rashes or lesions  Cardiac: No peripheral edema  Back/Flank: Not done  CNS/PNS: Normal musculature and movements, moves all extremities normally  Respiratory: Normal non-labored breathing  Abdomen: Soft nontender and nondistended  Peripheral Vascular: No peripheral edema  Mental Status/Psych: Alert and Oriented x 3. Normal mood and affect    Penis: Not done  Scrotal Skin: Not done  Testicles: Not done  Epididymis: Not done  Digital Rectal Exam:     Cystoscopy: Not done    Imaging: None    Urinalysis: UA RESULTS:  Recent Labs   Lab Test 04/20/23  1054   COLOR Yellow   APPEARANCE Clear   URINEGLC 500*   URINEBILI Negative   URINEKETONE Trace*   SG >=1.030   UBLD Negative   URINEPH 5.5   PROTEIN 30*   UROBILINOGEN 0.2   NITRITE Negative   LEUKEST Negative       PSA: 10.8    Post Void Residual:     Other labs: None today      IMPRESSION:  Prostate cancer  Rising PSA    PLAN:  His PSA has risen today.  In light of this we discussed options.    We discussed the option of following the PSA closely and checking it again in 1 to 2 months    We discussed the option of proceeding with treatment for his prostate cancer, either robotic prostatectomy or radiotherapy.    We discussed the option of proceeding with another MRI of the prostate and potentially another prostate biopsy in order to reevaluate the cancer.    He wishes to proceed with MRI of the prostate.  We will go over the MRI results together when it is completed.      Kamlesh Henry M.D.

## 2023-10-02 DIAGNOSIS — K21.00 GASTROESOPHAGEAL REFLUX DISEASE WITH ESOPHAGITIS: ICD-10-CM

## 2023-10-03 NOTE — TELEPHONE ENCOUNTER
Omeprazole (Prilosec) 20 mg    Last Office Visit: 5/2/23  [facility] Appointments: None  Medication last refilled: 10/14/22 #90 with 3 refill(s)    Prescription approved per [facility] Refill Protocol.    LATOYA DasN, RN, CCM

## 2023-10-24 ENCOUNTER — HOSPITAL ENCOUNTER (OUTPATIENT)
Dept: MRI IMAGING | Facility: CLINIC | Age: 78
Discharge: HOME | End: 2023-10-24
Attending: UROLOGY | Admitting: UROLOGY
Payer: MEDICARE

## 2023-10-24 DIAGNOSIS — C61 PROSTATE CANCER (H): ICD-10-CM

## 2023-10-24 PROCEDURE — 255N000002 HC RX 255 OP 636: Mod: JZ | Performed by: UROLOGY

## 2023-10-24 PROCEDURE — A9585 GADOBUTROL INJECTION: HCPCS | Mod: JZ | Performed by: UROLOGY

## 2023-10-24 PROCEDURE — 72197 MRI PELVIS W/O & W/DYE: CPT | Mod: 26 | Performed by: RADIOLOGY

## 2023-10-24 PROCEDURE — G1010 CDSM STANSON: HCPCS

## 2023-10-24 PROCEDURE — G1010 CDSM STANSON: HCPCS | Performed by: RADIOLOGY

## 2023-10-24 RX ORDER — GADOBUTROL 604.72 MG/ML
10 INJECTION INTRAVENOUS ONCE
Status: COMPLETED | OUTPATIENT
Start: 2023-10-24 | End: 2023-10-24

## 2023-10-24 RX ADMIN — GADOBUTROL 8 ML: 604.72 INJECTION INTRAVENOUS at 07:30

## 2023-10-26 ENCOUNTER — VIRTUAL VISIT (OUTPATIENT)
Dept: UROLOGY | Facility: CLINIC | Age: 78
End: 2023-10-26
Payer: MEDICARE

## 2023-10-26 VITALS — BODY MASS INDEX: 26.66 KG/M2 | WEIGHT: 180 LBS | HEIGHT: 69 IN

## 2023-10-26 DIAGNOSIS — C61 PROSTATE CANCER (H): Primary | ICD-10-CM

## 2023-10-26 PROCEDURE — 99214 OFFICE O/P EST MOD 30 MIN: CPT | Mod: VID | Performed by: UROLOGY

## 2023-10-26 ASSESSMENT — PAIN SCALES - GENERAL: PAINLEVEL: NO PAIN (0)

## 2023-10-26 NOTE — PROGRESS NOTES
Office Visit Note  [facility]  [phone]    UROLOGIC DIAGNOSES:   Low risk prostate cancer    CURRENT INTERVENTIONS:   Active surveillance  Prostate biopsy x2  MRI prostate x3    HISTORY:   Agustin is set up for virtual visit today to go over his prostate MRI results.  The most recent MRI of the prostate showed a 68 g prostate with a PI-RADS 4 lesion in the left side of the prostate that measures 17 mm in size.  Previously this lesion measured 14 mm and was rated as PI-RADS 3.  He continues to have no urinary complaints.  He has had a prior appendectomy and removal of undescended testicle but no other prior abdominal surgeries.  He reports being not sexually active and says erections are not terribly important to him.      PAST MEDICAL HISTORY:   Past Medical History:   Diagnosis Date    Esophageal reflux        PAST SURGICAL HISTORY:   Past Surgical History:   Procedure Laterality Date    APPENDECTOMY      cystoscopy with pyeloscopy with removal of calculus.      PROSTATE SURGERY      ROTATOR CUFF REPAIR RT/LT      surgery testis exploration of undescended.  1985    TESTICLE SURGERY      VASECTOMY         FAMILY HISTORY: No family history on file.    SOCIAL HISTORY:   Social History     Tobacco Use    Smoking status: Never    Smokeless tobacco: Never   Substance Use Topics    Alcohol use: Yes     Alcohol/week: 1.0 standard drink of alcohol     Types: 1 Standard drinks or equivalent per week       REVIEW OF SYSTEMS:  Skin: No rash, pruritis, or skin pigmentation  Eyes: No changes in vision  Ears/Nose/Throat: No changes in hearing, no nosebleeds  Respiratory: No shortness of breath, dyspnea on exertion, cough, or hemoptysis  Cardiovascular: No chest pain or palpitations  Gastrointestinal: No diarrhea or constipation. No abdominal pain. No hematochezia  Genitourinary: see HPI  Musculoskeletal: No pain or swelling of joints, normal range of motion  Neurologic: No weakness or tremors  Psychiatric: No  recent changes in memory or mood  Hematologic/Lymphatic/Immunologic: No easy bruising or enlarged lymph nodes  Endocrine: No weight gain or loss      PHYSICAL EXAM:    General: Alert and oriented to time, place, and self. In NAD   HEENT: Head AT/NC, EOMI, CN Grossly intact   Lungs: no respiratory distress, or pursed lip breathing   Heart: No obvious jugular venous distension present   Musculoskeltal: Normal movements. Normal appearing musculature  Skin: no suspicious lesions or rashes   Neuro: Alert, oriented, speech and mentation normal; moving all 4 extremities equally.   Psych: affect and mood normal    Imaging: None    Urinalysis: UA RESULTS:  Recent Labs   Lab Test 04/20/23  1054   COLOR Yellow   APPEARANCE Clear   URINEGLC 500*   URINEBILI Negative   URINEKETONE Trace*   SG >=1.030   UBLD Negative   URINEPH 5.5   PROTEIN 30*   UROBILINOGEN 0.2   NITRITE Negative   LEUKEST Negative       PSA: 10.8    Post Void Residual:     Other labs: None today      IMPRESSION:  Prostate cancer  Enlarged prostate    PLAN:  Agustin tells me today that he has been discussing his prostate cancer diagnosis with a friend who has had his prostate removed and before our conversation today he had already seen the MRI results and was likely going to proceed with a robotic assisted laparoscopic radical prostatectomy.  I counseled him that I was going to tell him that with the MRI changes we need to either repeat the biopsy or consider treatment for prostate cancer.  We discussed all options including biopsy and including treatment options and he wishes to undergo surgery.    We discussed robotic cystoscopy radical prostatectomy in detail today along with its risks and expected recovery.  I would recommend also lymph node dissection on him since his PSA is above 10.  He understands the risks of surgery well.  We discussed the status of the erectile nerves, he reports that erections are not terribly important to him.    We will get him  scheduled for surgery in the near future.      Kamlesh Henry M.D.             Virtual Visit Details    Type of service:  Video Visit   Video Start Time: 11:02 AM  Video End Time:11:19 AM    Originating Location (pt. Location): Home    Distant Location (provider location):  On-site  Platform used for Video Visit: Fuentes

## 2023-10-26 NOTE — LETTER
10/26/2023       RE: Agustin Nolan  [address] [place] [address]     Dear Colleague,    Thank you for referring your patient, Agustin Nolan, to the [facility] at [facility]. Please see a copy of my visit note below.    Office Visit Note  [facility]  [phone]    UROLOGIC DIAGNOSES:   Low risk prostate cancer    CURRENT INTERVENTIONS:   Active surveillance  Prostate biopsy x2  MRI prostate x3    HISTORY:   Agustin is set up for virtual visit today to go over his prostate MRI results.  The most recent MRI of the prostate showed a 68 g prostate with a PI-RADS 4 lesion in the left side of the prostate that measures 17 mm in size.  Previously this lesion measured 14 mm and was rated as PI-RADS 3.  He continues to have no urinary complaints.  He has had a prior appendectomy and removal of undescended testicle but no other prior abdominal surgeries.  He reports being not sexually active and says erections are not terribly important to him.      PAST MEDICAL HISTORY:   Past Medical History:   Diagnosis Date    Esophageal reflux        PAST SURGICAL HISTORY:   Past Surgical History:   Procedure Laterality Date    APPENDECTOMY      cystoscopy with pyeloscopy with removal of calculus.      PROSTATE SURGERY      ROTATOR CUFF REPAIR RT/LT      surgery testis exploration of undescended.  1985    TESTICLE SURGERY      VASECTOMY         FAMILY HISTORY: No family history on file.    SOCIAL HISTORY:   Social History     Tobacco Use    Smoking status: Never    Smokeless tobacco: Never   Substance Use Topics    Alcohol use: Yes     Alcohol/week: 1.0 standard drink of alcohol     Types: 1 Standard drinks or equivalent per week       REVIEW OF SYSTEMS:  Skin: No rash, pruritis, or skin pigmentation  Eyes: No changes in vision  Ears/Nose/Throat: No changes in hearing, no nosebleeds  Respiratory: No shortness of breath, dyspnea on exertion,  cough, or hemoptysis  Cardiovascular: No chest pain or palpitations  Gastrointestinal: No diarrhea or constipation. No abdominal pain. No hematochezia  Genitourinary: see HPI  Musculoskeletal: No pain or swelling of joints, normal range of motion  Neurologic: No weakness or tremors  Psychiatric: No recent changes in memory or mood  Hematologic/Lymphatic/Immunologic: No easy bruising or enlarged lymph nodes  Endocrine: No weight gain or loss      PHYSICAL EXAM:    General: Alert and oriented to time, place, and self. In NAD   HEENT: Head AT/NC, EOMI, CN Grossly intact   Lungs: no respiratory distress, or pursed lip breathing   Heart: No obvious jugular venous distension present   Musculoskeltal: Normal movements. Normal appearing musculature  Skin: no suspicious lesions or rashes   Neuro: Alert, oriented, speech and mentation normal; moving all 4 extremities equally.   Psych: affect and mood normal    Imaging: None    Urinalysis: UA RESULTS:  Recent Labs   Lab Test 04/20/23  1054   COLOR Yellow   APPEARANCE Clear   URINEGLC 500*   URINEBILI Negative   URINEKETONE Trace*   SG >=1.030   UBLD Negative   URINEPH 5.5   PROTEIN 30*   UROBILINOGEN 0.2   NITRITE Negative   LEUKEST Negative       PSA: 10.8    Post Void Residual:     Other labs: None today      IMPRESSION:  Prostate cancer  Enlarged prostate    PLAN:  Agustin tells me today that he has been discussing his prostate cancer diagnosis with a friend who has had his prostate removed and before our conversation today he had already seen the MRI results and was likely going to proceed with a robotic assisted laparoscopic radical prostatectomy.  I counseled him that I was going to tell him that with the MRI changes we need to either repeat the biopsy or consider treatment for prostate cancer.  We discussed all options including biopsy and including treatment options and he wishes to undergo surgery.    We discussed robotic cystoscopy radical prostatectomy in detail today  along with its risks and expected recovery.  I would recommend also lymph node dissection on him since his PSA is above 10.  He understands the risks of surgery well.  We discussed the status of the erectile nerves, he reports that erections are not terribly important to him.    We will get him scheduled for surgery in the near future.      Kamlesh Henry M.D.             Virtual Visit Details    Type of service:  Video Visit   Video Start Time: 11:02 AM  Video End Time:11:19 AM    Originating Location (pt. Location): Home    Distant Location (provider location):  On-site  Platform used for Video Visit: Fuentes

## 2023-10-26 NOTE — NURSING NOTE
Is the patient currently in the state of [place]? YES    Visit mode:VIDEO    If the visit is dropped, the patient can be reconnected by: VIDEO VISIT: Text to cell phone:   Telephone Information:   Mobile [phone]       Will anyone else be joining the visit? NO  (If patient encounters technical issues they should call [phone]    How would you like to obtain your AVS? MyChart    Are changes needed to the allergy or medication list? No    Reason for visit: Follow Up    Patricia TRISTAN

## 2023-10-31 ENCOUNTER — TELEPHONE (OUTPATIENT)
Dept: UROLOGY | Facility: CLINIC | Age: 78
End: 2023-10-31
Payer: MEDICARE

## 2023-10-31 NOTE — TELEPHONE ENCOUNTER
Patient is scheduled for surgery with Dr. hanson    Spoke with: josue    Date of Surgery: [id number]    Location: [facility]    Informed patient they will need an adult  y    Pre op with Provider y    H&P: Scheduled with pt to schedule    Additional imaging/appointments: n    Surgery packet: I mailed     Additional comments: post ops made        Priti Coreas on 10/31/2023 at 9:36 AM

## 2023-11-09 ENCOUNTER — CARE COORDINATION (OUTPATIENT)
Dept: UROLOGY | Facility: CLINIC | Age: 78
End: 2023-11-09
Payer: MEDICARE

## 2023-11-25 ENCOUNTER — HEALTH MAINTENANCE LETTER (OUTPATIENT)
Age: 78
End: 2023-11-25

## 2023-11-27 RX ORDER — CETIRIZINE HYDROCHLORIDE 10 MG/1
10 TABLET ORAL DAILY
COMMUNITY
End: 2024-06-25

## 2023-11-28 ENCOUNTER — OFFICE VISIT (OUTPATIENT)
Dept: FAMILY MEDICINE | Facility: CLINIC | Age: 78
End: 2023-11-28
Payer: MEDICARE

## 2023-11-28 VITALS
WEIGHT: 186.5 LBS | SYSTOLIC BLOOD PRESSURE: 124 MMHG | OXYGEN SATURATION: 99 % | TEMPERATURE: 98.2 F | HEART RATE: 78 BPM | BODY MASS INDEX: 27.62 KG/M2 | HEIGHT: 69 IN | DIASTOLIC BLOOD PRESSURE: 75 MMHG

## 2023-11-28 DIAGNOSIS — E11.9 TYPE 2 DIABETES MELLITUS WITHOUT COMPLICATION, WITHOUT LONG-TERM CURRENT USE OF INSULIN (H): ICD-10-CM

## 2023-11-28 DIAGNOSIS — Z01.818 PRE-OP EVALUATION: Primary | ICD-10-CM

## 2023-11-28 LAB
ERYTHROCYTE [DISTWIDTH] IN BLOOD BY AUTOMATED COUNT: 13.6 % (ref 10–15)
HBA1C MFR BLD: 7.5 %
HCT VFR BLD AUTO: 47.4 % (ref 40–53)
HGB BLD-MCNC: 15.1 G/DL (ref 13.3–17.7)
MCH RBC QN AUTO: 28.8 PG (ref 26.5–33)
MCHC RBC AUTO-ENTMCNC: 31.9 G/DL (ref 31.5–36.5)
MCV RBC AUTO: 90 FL (ref 78–100)
PLATELET # BLD AUTO: 306 10E3/UL (ref 150–450)
RBC # BLD AUTO: 5.25 10E6/UL (ref 4.4–5.9)
WBC # BLD AUTO: 6.9 10E3/UL (ref 4–11)

## 2023-11-28 PROCEDURE — 83036 HEMOGLOBIN GLYCOSYLATED A1C: CPT | Mod: ORL | Performed by: FAMILY MEDICINE

## 2023-11-28 PROCEDURE — 80048 BASIC METABOLIC PNL TOTAL CA: CPT | Mod: ORL | Performed by: FAMILY MEDICINE

## 2023-11-28 NOTE — NURSING NOTE
"Agustin  78 year old    Chief Complaint   Patient presents with    Pre-Op Exam     Prostatectomy on 12/04/23            Blood pressure 124/75, pulse 78, temperature 98.2  F (36.8  C), temperature source Skin, height 1.74 m (5' 8.5\"), weight 84.6 kg (186 lb 8 oz), SpO2 99%. Body mass index is 27.94 kg/m .    Patient Active Problem List   Diagnosis    Hyperlipidemia LDL goal <130    Medicare annual wellness visit, subsequent    Idiopathic peripheral neuropathy    Wears hearing aid in both ears    Diabetes mellitus, type 2 (H)              Wt Readings from Last 2 Encounters:   11/28/23 84.6 kg (186 lb 8 oz)   10/26/23 81.6 kg (180 lb)       BP Readings from Last 3 Encounters:   11/28/23 124/75   09/28/23 (!) 142/92   05/25/23 135/68                Current Outpatient Medications   Medication    ACCU-CHEK GUIDE test strip    aspirin (ASA) 81 MG EC tablet    blood glucose monitoring (ACCU-CHEK JHON PLUS) meter device kit    cetirizine (ZYRTEC) 10 MG tablet    Cholecalciferol (VITAMIN D) 2000 UNITS tablet    metFORMIN (GLUCOPHAGE XR) 500 MG 24 hr tablet    omeprazole (PRILOSEC) 20 MG DR capsule    pravastatin (PRAVACHOL) 40 MG tablet     No current facility-administered medications for this visit.              Social History     Tobacco Use    Smoking status: Never    Smokeless tobacco: Never   Substance Use Topics    Alcohol use: Yes     Alcohol/week: 1.0 standard drink of alcohol     Types: 1 Standard drinks or equivalent per week    Drug use: No              Health Maintenance Due   Topic Date Due    ADVANCE CARE PLANNING  Never done    COVID-19 Vaccine (1) Never done    RSV VACCINE (Pregnancy & 60+) (1 - 1-dose 60+ series) Never done    ZOSTER IMMUNIZATION (2 of 3) 03/24/2014    DTAP/TDAP/TD IMMUNIZATION (2 - Td or Tdap) 03/08/2022    INFLUENZA VACCINE (1) 09/01/2023    A1C  11/02/2023            No results found for: \"PAP\"           November 28, 2023 3:46 PM    "

## 2023-11-28 NOTE — H&P (VIEW-ONLY)
[facility] [address], [address]  [facility] [address]  Phone: [phone]  Fax: [phone]  Primary Provider: Cecelia Tomlin  Pre-op Performing Provider: CECELIA TOMLIN    PREOPERATIVE EVALUATION:  Today's date: 11/28/2023    Agustin is a 78 year old, presenting for the following:  Pre-Op Exam (Prostatectomy on 12/04/23)    NOTE:  Aspirin 81 mg was stopped ~10 days prior to surgery.    Surgical Information:  Surgery/Procedure: PROSTATECTOMY, ROBOT-ASSISTED, USING DA AIDA XI, WITH PELVIC LYMPHADENECTOMY   Surgery Location: [facility]  Surgeon: Kamlesh Henry MD  Surgery Date: 12/04/2023  Time of Surgery: 1235  Where patient plans to recover: At home with family  Fax number for surgical facility: Note does not need to be faxed, will be available electronically in Epic.    Subjective     HPI related to upcoming procedure: Agustin has experienced a rising PSA >1 year and has biopsy-proven prostate cancer.        11/21/2023    10:40 PM   Preop Questions   1. Have you ever had a heart attack or stroke? No   2. Have you ever had surgery on your heart or blood vessels, such as a stent placement, a coronary artery bypass, or surgery on an artery in your head, neck, heart, or legs? No   3. Do you have chest pain with activity? No   4. Do you have a history of  heart failure? No   5. Do you currently have a cold, bronchitis or symptoms of other infection? No   6. Do you have a cough, shortness of breath, or wheezing? No   7. Do you or anyone in your family have previous history of blood clots? No   8. Do you or does anyone in your family have a serious bleeding problem such as prolonged bleeding following surgeries or cuts? No   9. Have you ever had problems with anemia or been told to take iron pills? No   10. Have you had any abnormal blood loss such as black, tarry or bloody stools? No   11. Have you ever had a blood transfusion?  No   12. Are you willing to have a blood transfusion if it is medically needed before, during, or after your surgery? Yes   13. Have you or any of your relatives ever had problems with anesthesia? No   14. Do you have sleep apnea, excessive snoring or daytime drowsiness? No   15. Do you have any artifical heart valves or other implanted medical devices like a pacemaker, defibrillator, or continuous glucose monitor? No   16. Do you have artificial joints? No   17. Are you allergic to latex? No     Review of Systems  CONSTITUTIONAL: NEGATIVE for fever, chills, change in weight  INTEGUMENTARY/SKIN: NEGATIVE for worrisome rashes, moles or lesions  EYES: NEGATIVE for vision changes or irritation  ENT/MOUTH: NEGATIVE for ear, mouth and throat problems  RESP: NEGATIVE for significant cough or SOB  CV: NEGATIVE for chest pain, palpitations or peripheral edema  GI: NEGATIVE for nausea, abdominal pain, heartburn, or change in bowel habits  : NEGATIVE for frequency, dysuria, or hematuria  MUSCULOSKELETAL: NEGATIVE for significant arthralgias or myalgia  NEURO: NEGATIVE for weakness, dizziness or paresthesias  ENDOCRINE: NEGATIVE for temperature intolerance, skin/hair changes  HEME: NEGATIVE for bleeding problems  PSYCHIATRIC: NEGATIVE for changes in mood or affect    Patient Active Problem List    Diagnosis Date Noted    Diabetes mellitus, type 2 (H) 06/04/2020     Priority: Medium    Medicare annual wellness visit, subsequent 03/25/2018     Priority: Medium    Idiopathic peripheral neuropathy 03/25/2018     Priority: Medium    Wears hearing aid in both ears 03/25/2018     Priority: Medium    Hyperlipidemia LDL goal <130 08/24/2013     Priority: Medium      Past Medical History:   Diagnosis Date    Esophageal reflux      Past Surgical History:   Procedure Laterality Date    APPENDECTOMY      cystoscopy with pyeloscopy with removal of calculus.      PROSTATE SURGERY      ROTATOR CUFF REPAIR RT/LT      surgery testis  "exploration of undescended.  1985    TESTICLE SURGERY      VASECTOMY       Current Outpatient Medications   Medication Sig Dispense Refill    ACCU-CHEK GUIDE test strip 1 strip by In Vitro route daily To test blood sugar 100 strip 1    aspirin (ASA) 81 MG EC tablet Take 1 tablet (81 mg) by mouth daily 300 tablet 1    blood glucose monitoring (ACCU-CHEK JHON PLUS) meter device kit Use to test blood sugar 1 times daily or as directed. 1 kit 0    cetirizine (ZYRTEC) 10 MG tablet Take 10 mg by mouth daily      Cholecalciferol (VITAMIN D) 2000 UNITS tablet Take 1 tablet by mouth daily      metFORMIN (GLUCOPHAGE XR) 500 MG 24 hr tablet Take one tab with breakfast and one tablet with dinner. 180 tablet 1    omeprazole (PRILOSEC) 20 MG DR capsule Take 1 capsule (20 mg) by mouth daily 90 capsule 1    pravastatin (PRAVACHOL) 40 MG tablet Take 1 tablet (40 mg) by mouth daily 90 tablet 3       Allergies   Allergen Reactions    Cats      Hard time breathing, running nose        Social History     Tobacco Use    Smoking status: Never    Smokeless tobacco: Never   Substance Use Topics    Alcohol use: Yes     Alcohol/week: 1.0 standard drink of alcohol     Types: 1 Standard drinks or equivalent per week       Objective     /75 (BP Location: Left arm, Patient Position: Sitting, Cuff Size: Adult Regular)   Pulse 78   Temp 98.2  F (36.8  C) (Skin)   Ht 1.74 m (5' 8.5\")   Wt 84.6 kg (186 lb 8 oz)   SpO2 99%   BMI 27.94 kg/m      Physical Exam    GENERAL APPEARANCE: healthy, alert and no distress     EYES: EOMI,  PERRL     HENT: ear canals and TM's normal and nose and mouth without ulcers or lesions     NECK: no adenopathy, no asymmetry, masses, or scars and thyroid normal to palpation     RESP: lungs clear to auscultation - no rales, rhonchi or wheezes     CV: regular rates and rhythm, normal S1 S2, no S3 or S4 and no murmur, click or rub     MS: extremities normal- no gross deformities noted, no evidence of inflammation " in joints, FROM in all extremities.     SKIN: no suspicious lesions or rashes     NEURO: Normal strength and tone, sensory exam grossly normal, mentation intact and speech normal     PSYCH: mentation appears normal. and affect normal/bright     LYMPHATICS: No cervical adenopathy    Recent Labs   Lab Test 05/02/23  1343 04/27/22  1427    138   POTASSIUM 3.9 4.3   CR 1.02 1.00   A1C 7.3* 7.6*        Diagnostics:  Recent Results (from the past 168 hour(s))   Basic metabolic panel    Collection Time: 11/28/23  3:55 PM   Result Value Ref Range    Sodium 139 135 - 145 mmol/L    Potassium 4.3 3.4 - 5.3 mmol/L    Chloride 101 98 - 107 mmol/L    Carbon Dioxide (CO2) 28 22 - 29 mmol/L    Anion Gap 10 7 - 15 mmol/L    Urea Nitrogen 16.3 8.0 - 23.0 mg/dL    Creatinine 1.04 0.67 - 1.17 mg/dL    GFR Estimate 74 >60 mL/min/1.73m2    Calcium 9.7 8.8 - 10.2 mg/dL    Glucose 149 (H) 70 - 99 mg/dL   CBC with platelets    Collection Time: 11/28/23  3:55 PM   Result Value Ref Range    WBC Count 6.9 4.0 - 11.0 10e3/uL    RBC Count 5.25 4.40 - 5.90 10e6/uL    Hemoglobin 15.1 13.3 - 17.7 g/dL    Hematocrit 47.4 40.0 - 53.0 %    MCV 90 78 - 100 fL    MCH 28.8 26.5 - 33.0 pg    MCHC 31.9 31.5 - 36.5 g/dL    RDW 13.6 10.0 - 15.0 %    Platelet Count 306 150 - 450 10e3/uL   Hemoglobin A1c    Collection Time: 11/28/23  3:55 PM   Result Value Ref Range    Hemoglobin A1C 7.5 (H) <5.7 %      EKG: appears normal, NSR, normal axis, normal intervals, no acute ST/T changes c/w ischemia, no LVH by voltage criteria, Premature Atrial Contractions (PAC) noted, unchanged from previous tracings    Revised Cardiac Risk Index (RCRI):  The patient has the following serious cardiovascular risks for perioperative complications:   - No serious cardiac risks = 0 points     RCRI Interpretation: 0 points: Class I (very low risk - 0.4% complication rate)         Signed Electronically by: Vicente Tomlin MD  Copy of this evaluation report is provided to  requesting physician.

## 2023-11-28 NOTE — PROGRESS NOTES
[facility] [address], [address]  [facility] [address]  Phone: [phone]  Fax: [phone]  Primary Provider: Cecelia Tomlin  Pre-op Performing Provider: CECELIA TOMLIN    PREOPERATIVE EVALUATION:  Today's date: 11/28/2023    Agustin is a 78 year old, presenting for the following:  Pre-Op Exam (Prostatectomy on 12/04/23)    NOTE:  Aspirin 81 mg was stopped ~10 days prior to surgery.    Surgical Information:  Surgery/Procedure: PROSTATECTOMY, ROBOT-ASSISTED, USING DA AIDA XI, WITH PELVIC LYMPHADENECTOMY   Surgery Location: [facility]  Surgeon: Kamlesh Henry MD  Surgery Date: 12/04/2023  Time of Surgery: 1235  Where patient plans to recover: At home with family  Fax number for surgical facility: Note does not need to be faxed, will be available electronically in Epic.    Subjective     HPI related to upcoming procedure: Agustin has experienced a rising PSA >1 year and has biopsy-proven prostate cancer.        11/21/2023    10:40 PM   Preop Questions   1. Have you ever had a heart attack or stroke? No   2. Have you ever had surgery on your heart or blood vessels, such as a stent placement, a coronary artery bypass, or surgery on an artery in your head, neck, heart, or legs? No   3. Do you have chest pain with activity? No   4. Do you have a history of  heart failure? No   5. Do you currently have a cold, bronchitis or symptoms of other infection? No   6. Do you have a cough, shortness of breath, or wheezing? No   7. Do you or anyone in your family have previous history of blood clots? No   8. Do you or does anyone in your family have a serious bleeding problem such as prolonged bleeding following surgeries or cuts? No   9. Have you ever had problems with anemia or been told to take iron pills? No   10. Have you had any abnormal blood loss such as black, tarry or bloody stools? No   11. Have you ever had a blood transfusion?  No   12. Are you willing to have a blood transfusion if it is medically needed before, during, or after your surgery? Yes   13. Have you or any of your relatives ever had problems with anesthesia? No   14. Do you have sleep apnea, excessive snoring or daytime drowsiness? No   15. Do you have any artifical heart valves or other implanted medical devices like a pacemaker, defibrillator, or continuous glucose monitor? No   16. Do you have artificial joints? No   17. Are you allergic to latex? No     Review of Systems  CONSTITUTIONAL: NEGATIVE for fever, chills, change in weight  INTEGUMENTARY/SKIN: NEGATIVE for worrisome rashes, moles or lesions  EYES: NEGATIVE for vision changes or irritation  ENT/MOUTH: NEGATIVE for ear, mouth and throat problems  RESP: NEGATIVE for significant cough or SOB  CV: NEGATIVE for chest pain, palpitations or peripheral edema  GI: NEGATIVE for nausea, abdominal pain, heartburn, or change in bowel habits  : NEGATIVE for frequency, dysuria, or hematuria  MUSCULOSKELETAL: NEGATIVE for significant arthralgias or myalgia  NEURO: NEGATIVE for weakness, dizziness or paresthesias  ENDOCRINE: NEGATIVE for temperature intolerance, skin/hair changes  HEME: NEGATIVE for bleeding problems  PSYCHIATRIC: NEGATIVE for changes in mood or affect    Patient Active Problem List    Diagnosis Date Noted    Diabetes mellitus, type 2 (H) 06/04/2020     Priority: Medium    Medicare annual wellness visit, subsequent 03/25/2018     Priority: Medium    Idiopathic peripheral neuropathy 03/25/2018     Priority: Medium    Wears hearing aid in both ears 03/25/2018     Priority: Medium    Hyperlipidemia LDL goal <130 08/24/2013     Priority: Medium      Past Medical History:   Diagnosis Date    Esophageal reflux      Past Surgical History:   Procedure Laterality Date    APPENDECTOMY      cystoscopy with pyeloscopy with removal of calculus.      PROSTATE SURGERY      ROTATOR CUFF REPAIR RT/LT      surgery testis  "exploration of undescended.  1985    TESTICLE SURGERY      VASECTOMY       Current Outpatient Medications   Medication Sig Dispense Refill    ACCU-CHEK GUIDE test strip 1 strip by In Vitro route daily To test blood sugar 100 strip 1    aspirin (ASA) 81 MG EC tablet Take 1 tablet (81 mg) by mouth daily 300 tablet 1    blood glucose monitoring (ACCU-CHEK JHON PLUS) meter device kit Use to test blood sugar 1 times daily or as directed. 1 kit 0    cetirizine (ZYRTEC) 10 MG tablet Take 10 mg by mouth daily      Cholecalciferol (VITAMIN D) 2000 UNITS tablet Take 1 tablet by mouth daily      metFORMIN (GLUCOPHAGE XR) 500 MG 24 hr tablet Take one tab with breakfast and one tablet with dinner. 180 tablet 1    omeprazole (PRILOSEC) 20 MG DR capsule Take 1 capsule (20 mg) by mouth daily 90 capsule 1    pravastatin (PRAVACHOL) 40 MG tablet Take 1 tablet (40 mg) by mouth daily 90 tablet 3       Allergies   Allergen Reactions    Cats      Hard time breathing, running nose        Social History     Tobacco Use    Smoking status: Never    Smokeless tobacco: Never   Substance Use Topics    Alcohol use: Yes     Alcohol/week: 1.0 standard drink of alcohol     Types: 1 Standard drinks or equivalent per week       Objective     /75 (BP Location: Left arm, Patient Position: Sitting, Cuff Size: Adult Regular)   Pulse 78   Temp 98.2  F (36.8  C) (Skin)   Ht 1.74 m (5' 8.5\")   Wt 84.6 kg (186 lb 8 oz)   SpO2 99%   BMI 27.94 kg/m      Physical Exam    GENERAL APPEARANCE: healthy, alert and no distress     EYES: EOMI,  PERRL     HENT: ear canals and TM's normal and nose and mouth without ulcers or lesions     NECK: no adenopathy, no asymmetry, masses, or scars and thyroid normal to palpation     RESP: lungs clear to auscultation - no rales, rhonchi or wheezes     CV: regular rates and rhythm, normal S1 S2, no S3 or S4 and no murmur, click or rub     MS: extremities normal- no gross deformities noted, no evidence of inflammation " in joints, FROM in all extremities.     SKIN: no suspicious lesions or rashes     NEURO: Normal strength and tone, sensory exam grossly normal, mentation intact and speech normal     PSYCH: mentation appears normal. and affect normal/bright     LYMPHATICS: No cervical adenopathy    Recent Labs   Lab Test 05/02/23  1343 04/27/22  1427    138   POTASSIUM 3.9 4.3   CR 1.02 1.00   A1C 7.3* 7.6*        Diagnostics:  Recent Results (from the past 168 hour(s))   Basic metabolic panel    Collection Time: 11/28/23  3:55 PM   Result Value Ref Range    Sodium 139 135 - 145 mmol/L    Potassium 4.3 3.4 - 5.3 mmol/L    Chloride 101 98 - 107 mmol/L    Carbon Dioxide (CO2) 28 22 - 29 mmol/L    Anion Gap 10 7 - 15 mmol/L    Urea Nitrogen 16.3 8.0 - 23.0 mg/dL    Creatinine 1.04 0.67 - 1.17 mg/dL    GFR Estimate 74 >60 mL/min/1.73m2    Calcium 9.7 8.8 - 10.2 mg/dL    Glucose 149 (H) 70 - 99 mg/dL   CBC with platelets    Collection Time: 11/28/23  3:55 PM   Result Value Ref Range    WBC Count 6.9 4.0 - 11.0 10e3/uL    RBC Count 5.25 4.40 - 5.90 10e6/uL    Hemoglobin 15.1 13.3 - 17.7 g/dL    Hematocrit 47.4 40.0 - 53.0 %    MCV 90 78 - 100 fL    MCH 28.8 26.5 - 33.0 pg    MCHC 31.9 31.5 - 36.5 g/dL    RDW 13.6 10.0 - 15.0 %    Platelet Count 306 150 - 450 10e3/uL   Hemoglobin A1c    Collection Time: 11/28/23  3:55 PM   Result Value Ref Range    Hemoglobin A1C 7.5 (H) <5.7 %      EKG: appears normal, NSR, normal axis, normal intervals, no acute ST/T changes c/w ischemia, no LVH by voltage criteria, Premature Atrial Contractions (PAC) noted, unchanged from previous tracings    Revised Cardiac Risk Index (RCRI):  The patient has the following serious cardiovascular risks for perioperative complications:   - No serious cardiac risks = 0 points     RCRI Interpretation: 0 points: Class I (very low risk - 0.4% complication rate)         Signed Electronically by: Vicente Tomlin MD  Copy of this evaluation report is provided to  requesting physician.

## 2023-11-29 LAB
ANION GAP SERPL CALCULATED.3IONS-SCNC: 10 MMOL/L (ref 7–15)
BUN SERPL-MCNC: 16.3 MG/DL (ref 8–23)
CALCIUM SERPL-MCNC: 9.7 MG/DL (ref 8.8–10.2)
CHLORIDE SERPL-SCNC: 101 MMOL/L (ref 98–107)
CREAT SERPL-MCNC: 1.04 MG/DL (ref 0.67–1.17)
DEPRECATED HCO3 PLAS-SCNC: 28 MMOL/L (ref 22–29)
EGFRCR SERPLBLD CKD-EPI 2021: 74 ML/MIN/1.73M2
GLUCOSE SERPL-MCNC: 149 MG/DL (ref 70–99)
POTASSIUM SERPL-SCNC: 4.3 MMOL/L (ref 3.4–5.3)
SODIUM SERPL-SCNC: 139 MMOL/L (ref 135–145)

## 2023-12-03 NOTE — PHARMACY-ADMISSION MEDICATION HISTORY
PTA meds completed by pre-admitting nurse Yamel Parra and reviewed by pharmacy       Prior to Admission medications    Medication Sig Last Dose Taking? Auth Provider Long Term End Date   aspirin (ASA) 81 MG EC tablet Take 81 mg by mouth daily 11/24/2023 Yes Vicente Tomlin MD     cetirizine (ZYRTEC) 10 MG tablet Take 10 mg by mouth daily  Yes Reported, Patient     Cholecalciferol (VITAMIN D) 2000 UNITS tablet Take 1 tablet by mouth daily  Yes Reported, Patient     metFORMIN (GLUCOPHAGE XR) 500 MG 24 hr tablet Take one tab with breakfast and one tablet with dinner.  Yes Vicente Tomlin MD Yes    omeprazole (PRILOSEC) 20 MG DR capsule Take 1 capsule (20 mg) by mouth daily  Yes Vicente Tomlin MD     ACCU-CHEK GUIDE test strip 1 strip by In Vitro route daily To test blood sugar   Vicente Tomlin MD     blood glucose monitoring (ACCU-CHEK JHON PLUS) meter device kit Use to test blood sugar 1 times daily or as directed.   Vicente Tomlin MD     pravastatin (PRAVACHOL) 40 MG tablet Take 1 tablet (40 mg) by mouth daily   Vicente Tomlin MD Yes

## 2023-12-04 ENCOUNTER — ANESTHESIA EVENT (OUTPATIENT)
Dept: SURGERY | Facility: CLINIC | Age: 78
End: 2023-12-04
Payer: MEDICARE

## 2023-12-04 ENCOUNTER — HOSPITAL ENCOUNTER (OUTPATIENT)
Facility: CLINIC | Age: 78
Discharge: HOME | End: 2023-12-06
Attending: UROLOGY | Admitting: UROLOGY
Payer: MEDICARE

## 2023-12-04 ENCOUNTER — ANESTHESIA (OUTPATIENT)
Dept: SURGERY | Facility: CLINIC | Age: 78
End: 2023-12-04
Payer: MEDICARE

## 2023-12-04 DIAGNOSIS — C61 PROSTATE CANCER (H): Primary | ICD-10-CM

## 2023-12-04 LAB — GLUCOSE BLDC GLUCOMTR-MCNC: 186 MG/DL (ref 70–99)

## 2023-12-04 PROCEDURE — 360N000080 HC SURGERY LEVEL 7, PER MIN: Performed by: UROLOGY

## 2023-12-04 PROCEDURE — 250N000011 HC RX IP 250 OP 636: Performed by: UROLOGY

## 2023-12-04 PROCEDURE — 82962 GLUCOSE BLOOD TEST: CPT

## 2023-12-04 PROCEDURE — 250N000013 HC RX MED GY IP 250 OP 250 PS 637: Performed by: UROLOGY

## 2023-12-04 PROCEDURE — 250N000011 HC RX IP 250 OP 636: Performed by: NURSE ANESTHETIST, CERTIFIED REGISTERED

## 2023-12-04 PROCEDURE — 250N000009 HC RX 250: Performed by: NURSE ANESTHETIST, CERTIFIED REGISTERED

## 2023-12-04 PROCEDURE — 258N000003 HC RX IP 258 OP 636: Performed by: ANESTHESIOLOGY

## 2023-12-04 PROCEDURE — 258N000003 HC RX IP 258 OP 636: Performed by: NURSE ANESTHETIST, CERTIFIED REGISTERED

## 2023-12-04 PROCEDURE — 710N000009 HC RECOVERY PHASE 1, LEVEL 1, PER MIN: Performed by: UROLOGY

## 2023-12-04 PROCEDURE — 999N000141 HC STATISTIC PRE-PROCEDURE NURSING ASSESSMENT: Performed by: UROLOGY

## 2023-12-04 PROCEDURE — 250N000011 HC RX IP 250 OP 636: Performed by: ANESTHESIOLOGY

## 2023-12-04 PROCEDURE — 250N000025 HC SEVOFLURANE, PER MIN: Performed by: UROLOGY

## 2023-12-04 PROCEDURE — 258N000003 HC RX IP 258 OP 636: Performed by: UROLOGY

## 2023-12-04 PROCEDURE — 370N000017 HC ANESTHESIA TECHNICAL FEE, PER MIN: Performed by: UROLOGY

## 2023-12-04 PROCEDURE — 272N000001 HC OR GENERAL SUPPLY STERILE: Performed by: UROLOGY

## 2023-12-04 PROCEDURE — 250N000013 HC RX MED GY IP 250 OP 250 PS 637: Performed by: ANESTHESIOLOGY

## 2023-12-04 PROCEDURE — 258N000001 HC RX 258: Performed by: UROLOGY

## 2023-12-04 PROCEDURE — 88307 TISSUE EXAM BY PATHOLOGIST: CPT | Mod: TC | Performed by: UROLOGY

## 2023-12-04 RX ORDER — HEPARIN SODIUM 5000 [USP'U]/.5ML
5000 INJECTION, SOLUTION INTRAVENOUS; SUBCUTANEOUS EVERY 12 HOURS
Status: DISCONTINUED | OUTPATIENT
Start: 2023-12-05 | End: 2023-12-06 | Stop reason: HOSPADM

## 2023-12-04 RX ORDER — BUPIVACAINE HYDROCHLORIDE 2.5 MG/ML
INJECTION, SOLUTION INFILTRATION; PERINEURAL PRN
Status: DISCONTINUED | OUTPATIENT
Start: 2023-12-04 | End: 2023-12-04 | Stop reason: HOSPADM

## 2023-12-04 RX ORDER — ONDANSETRON 4 MG/1
4 TABLET, ORALLY DISINTEGRATING ORAL EVERY 6 HOURS PRN
Status: DISCONTINUED | OUTPATIENT
Start: 2023-12-04 | End: 2023-12-06 | Stop reason: HOSPADM

## 2023-12-04 RX ORDER — ACETAMINOPHEN 325 MG/1
650 TABLET ORAL EVERY 4 HOURS PRN
Status: DISCONTINUED | OUTPATIENT
Start: 2023-12-04 | End: 2023-12-04 | Stop reason: HOSPADM

## 2023-12-04 RX ORDER — SODIUM CHLORIDE, SODIUM LACTATE, POTASSIUM CHLORIDE, CALCIUM CHLORIDE 600; 310; 30; 20 MG/100ML; MG/100ML; MG/100ML; MG/100ML
INJECTION, SOLUTION INTRAVENOUS CONTINUOUS
Status: DISCONTINUED | OUTPATIENT
Start: 2023-12-04 | End: 2023-12-04

## 2023-12-04 RX ORDER — LIDOCAINE 40 MG/G
CREAM TOPICAL
Status: DISCONTINUED | OUTPATIENT
Start: 2023-12-04 | End: 2023-12-04

## 2023-12-04 RX ORDER — LIDOCAINE 40 MG/G
CREAM TOPICAL
Status: DISCONTINUED | OUTPATIENT
Start: 2023-12-04 | End: 2023-12-06 | Stop reason: HOSPADM

## 2023-12-04 RX ORDER — ONDANSETRON 2 MG/ML
4 INJECTION INTRAMUSCULAR; INTRAVENOUS EVERY 6 HOURS PRN
Status: DISCONTINUED | OUTPATIENT
Start: 2023-12-04 | End: 2023-12-06 | Stop reason: HOSPADM

## 2023-12-04 RX ORDER — ONDANSETRON 2 MG/ML
4 INJECTION INTRAMUSCULAR; INTRAVENOUS EVERY 30 MIN PRN
Status: DISCONTINUED | OUTPATIENT
Start: 2023-12-04 | End: 2023-12-04 | Stop reason: HOSPADM

## 2023-12-04 RX ORDER — DOCUSATE SODIUM 100 MG/1
100 CAPSULE, LIQUID FILLED ORAL 2 TIMES DAILY
Status: DISCONTINUED | OUTPATIENT
Start: 2023-12-04 | End: 2023-12-06 | Stop reason: HOSPADM

## 2023-12-04 RX ORDER — NALOXONE HYDROCHLORIDE 0.4 MG/ML
0.4 INJECTION, SOLUTION INTRAMUSCULAR; INTRAVENOUS; SUBCUTANEOUS
Status: DISCONTINUED | OUTPATIENT
Start: 2023-12-04 | End: 2023-12-06 | Stop reason: HOSPADM

## 2023-12-04 RX ORDER — FENTANYL CITRATE 50 UG/ML
50 INJECTION, SOLUTION INTRAMUSCULAR; INTRAVENOUS EVERY 5 MIN PRN
Status: DISCONTINUED | OUTPATIENT
Start: 2023-12-04 | End: 2023-12-04 | Stop reason: HOSPADM

## 2023-12-04 RX ORDER — HYDROMORPHONE HCL IN WATER/PF 6 MG/30 ML
0.4 PATIENT CONTROLLED ANALGESIA SYRINGE INTRAVENOUS EVERY 5 MIN PRN
Status: DISCONTINUED | OUTPATIENT
Start: 2023-12-04 | End: 2023-12-04 | Stop reason: HOSPADM

## 2023-12-04 RX ORDER — ONDANSETRON 4 MG/1
4-8 TABLET, ORALLY DISINTEGRATING ORAL EVERY 8 HOURS PRN
Qty: 10 TABLET | Refills: 0 | Status: SHIPPED | OUTPATIENT
Start: 2023-12-04 | End: 2024-06-25

## 2023-12-04 RX ORDER — OXYCODONE HYDROCHLORIDE 5 MG/1
5 TABLET ORAL EVERY 4 HOURS PRN
Status: DISCONTINUED | OUTPATIENT
Start: 2023-12-04 | End: 2023-12-06 | Stop reason: HOSPADM

## 2023-12-04 RX ORDER — HYDROXYZINE HYDROCHLORIDE 10 MG/1
10 TABLET, FILM COATED ORAL EVERY 6 HOURS PRN
Qty: 30 TABLET | Refills: 0 | Status: SHIPPED | OUTPATIENT
Start: 2023-12-04 | End: 2024-06-25

## 2023-12-04 RX ORDER — HYDROMORPHONE HCL IN WATER/PF 6 MG/30 ML
0.2 PATIENT CONTROLLED ANALGESIA SYRINGE INTRAVENOUS EVERY 5 MIN PRN
Status: DISCONTINUED | OUTPATIENT
Start: 2023-12-04 | End: 2023-12-04 | Stop reason: HOSPADM

## 2023-12-04 RX ORDER — NALOXONE HYDROCHLORIDE 0.4 MG/ML
0.2 INJECTION, SOLUTION INTRAMUSCULAR; INTRAVENOUS; SUBCUTANEOUS
Status: DISCONTINUED | OUTPATIENT
Start: 2023-12-04 | End: 2023-12-06 | Stop reason: HOSPADM

## 2023-12-04 RX ORDER — SODIUM CHLORIDE, SODIUM LACTATE, POTASSIUM CHLORIDE, CALCIUM CHLORIDE 600; 310; 30; 20 MG/100ML; MG/100ML; MG/100ML; MG/100ML
INJECTION, SOLUTION INTRAVENOUS CONTINUOUS
Status: DISCONTINUED | OUTPATIENT
Start: 2023-12-04 | End: 2023-12-04 | Stop reason: HOSPADM

## 2023-12-04 RX ORDER — ONDANSETRON 4 MG/1
4 TABLET, ORALLY DISINTEGRATING ORAL EVERY 30 MIN PRN
Status: DISCONTINUED | OUTPATIENT
Start: 2023-12-04 | End: 2023-12-04 | Stop reason: HOSPADM

## 2023-12-04 RX ORDER — OXYCODONE HYDROCHLORIDE 5 MG/1
10 TABLET ORAL EVERY 4 HOURS PRN
Status: DISCONTINUED | OUTPATIENT
Start: 2023-12-04 | End: 2023-12-06 | Stop reason: HOSPADM

## 2023-12-04 RX ORDER — PROPOFOL 10 MG/ML
INJECTION, EMULSION INTRAVENOUS PRN
Status: DISCONTINUED | OUTPATIENT
Start: 2023-12-04 | End: 2023-12-04

## 2023-12-04 RX ORDER — PROCHLORPERAZINE MALEATE 5 MG
5 TABLET ORAL EVERY 6 HOURS PRN
Status: DISCONTINUED | OUTPATIENT
Start: 2023-12-04 | End: 2023-12-06 | Stop reason: HOSPADM

## 2023-12-04 RX ORDER — LIDOCAINE HYDROCHLORIDE 20 MG/ML
INJECTION, SOLUTION INFILTRATION; PERINEURAL PRN
Status: DISCONTINUED | OUTPATIENT
Start: 2023-12-04 | End: 2023-12-04

## 2023-12-04 RX ORDER — METFORMIN HCL 500 MG
500 TABLET, EXTENDED RELEASE 24 HR ORAL
Status: DISCONTINUED | OUTPATIENT
Start: 2023-12-04 | End: 2023-12-06 | Stop reason: HOSPADM

## 2023-12-04 RX ORDER — CETIRIZINE HYDROCHLORIDE 10 MG/1
10 TABLET ORAL DAILY
Status: DISCONTINUED | OUTPATIENT
Start: 2023-12-05 | End: 2023-12-06 | Stop reason: HOSPADM

## 2023-12-04 RX ORDER — CEFAZOLIN SODIUM/WATER 2 G/20 ML
2 SYRINGE (ML) INTRAVENOUS
Status: COMPLETED | OUTPATIENT
Start: 2023-12-04 | End: 2023-12-04

## 2023-12-04 RX ORDER — DEXTROSE MONOHYDRATE, SODIUM CHLORIDE, AND POTASSIUM CHLORIDE 50; 1.49; 4.5 G/1000ML; G/1000ML; G/1000ML
INJECTION, SOLUTION INTRAVENOUS CONTINUOUS
Status: DISCONTINUED | OUTPATIENT
Start: 2023-12-04 | End: 2023-12-05

## 2023-12-04 RX ORDER — ACETAMINOPHEN 325 MG/1
650 TABLET ORAL EVERY 4 HOURS PRN
Qty: 100 TABLET | Refills: 0 | Status: SHIPPED | OUTPATIENT
Start: 2023-12-04 | End: 2024-06-25

## 2023-12-04 RX ORDER — CEFAZOLIN SODIUM/WATER 2 G/20 ML
2 SYRINGE (ML) INTRAVENOUS SEE ADMIN INSTRUCTIONS
Status: DISCONTINUED | OUTPATIENT
Start: 2023-12-04 | End: 2023-12-04

## 2023-12-04 RX ORDER — FENTANYL CITRATE 50 UG/ML
25 INJECTION, SOLUTION INTRAMUSCULAR; INTRAVENOUS EVERY 5 MIN PRN
Status: DISCONTINUED | OUTPATIENT
Start: 2023-12-04 | End: 2023-12-04 | Stop reason: HOSPADM

## 2023-12-04 RX ORDER — FENTANYL CITRATE 50 UG/ML
INJECTION, SOLUTION INTRAMUSCULAR; INTRAVENOUS PRN
Status: DISCONTINUED | OUTPATIENT
Start: 2023-12-04 | End: 2023-12-04

## 2023-12-04 RX ORDER — PRAVASTATIN SODIUM 20 MG
40 TABLET ORAL EVERY EVENING
Status: DISCONTINUED | OUTPATIENT
Start: 2023-12-04 | End: 2023-12-06 | Stop reason: HOSPADM

## 2023-12-04 RX ORDER — DEXAMETHASONE SODIUM PHOSPHATE 4 MG/ML
INJECTION, SOLUTION INTRA-ARTICULAR; INTRALESIONAL; INTRAMUSCULAR; INTRAVENOUS; SOFT TISSUE PRN
Status: DISCONTINUED | OUTPATIENT
Start: 2023-12-04 | End: 2023-12-04

## 2023-12-04 RX ORDER — PANTOPRAZOLE SODIUM 40 MG/1
40 TABLET, DELAYED RELEASE ORAL
Status: DISCONTINUED | OUTPATIENT
Start: 2023-12-05 | End: 2023-12-06 | Stop reason: HOSPADM

## 2023-12-04 RX ADMIN — ROCURONIUM BROMIDE 20 MG: 50 INJECTION, SOLUTION INTRAVENOUS at 13:40

## 2023-12-04 RX ADMIN — PHENYLEPHRINE HYDROCHLORIDE 100 MCG: 10 INJECTION INTRAVENOUS at 13:41

## 2023-12-04 RX ADMIN — POTASSIUM CHLORIDE, DEXTROSE MONOHYDRATE AND SODIUM CHLORIDE: 150; 5; 450 INJECTION, SOLUTION INTRAVENOUS at 19:04

## 2023-12-04 RX ADMIN — DOCUSATE SODIUM 100 MG: 100 CAPSULE, LIQUID FILLED ORAL at 21:09

## 2023-12-04 RX ADMIN — ROCURONIUM BROMIDE 20 MG: 50 INJECTION, SOLUTION INTRAVENOUS at 12:49

## 2023-12-04 RX ADMIN — FENTANYL CITRATE 100 MCG: 50 INJECTION INTRAMUSCULAR; INTRAVENOUS at 11:28

## 2023-12-04 RX ADMIN — SODIUM CHLORIDE, POTASSIUM CHLORIDE, SODIUM LACTATE AND CALCIUM CHLORIDE: 600; 310; 30; 20 INJECTION, SOLUTION INTRAVENOUS at 10:58

## 2023-12-04 RX ADMIN — LIDOCAINE HYDROCHLORIDE 50 MG: 20 INJECTION, SOLUTION INFILTRATION; PERINEURAL at 11:28

## 2023-12-04 RX ADMIN — PROPOFOL 150 MG: 10 INJECTION, EMULSION INTRAVENOUS at 11:28

## 2023-12-04 RX ADMIN — PROPOFOL 75 MCG/KG/MIN: 10 INJECTION, EMULSION INTRAVENOUS at 11:31

## 2023-12-04 RX ADMIN — METFORMIN HYDROCHLORIDE 500 MG: 500 TABLET, EXTENDED RELEASE ORAL at 19:10

## 2023-12-04 RX ADMIN — SUGAMMADEX 200 MG: 100 INJECTION, SOLUTION INTRAVENOUS at 14:20

## 2023-12-04 RX ADMIN — ROCURONIUM BROMIDE 50 MG: 50 INJECTION, SOLUTION INTRAVENOUS at 11:28

## 2023-12-04 RX ADMIN — Medication 2 G: at 11:23

## 2023-12-04 RX ADMIN — ROCURONIUM BROMIDE 20 MG: 50 INJECTION, SOLUTION INTRAVENOUS at 11:53

## 2023-12-04 RX ADMIN — HYDROMORPHONE HYDROCHLORIDE 0.5 MG: 1 INJECTION, SOLUTION INTRAMUSCULAR; INTRAVENOUS; SUBCUTANEOUS at 11:44

## 2023-12-04 RX ADMIN — PHENYLEPHRINE HYDROCHLORIDE 100 MCG: 10 INJECTION INTRAVENOUS at 13:20

## 2023-12-04 RX ADMIN — PRAVASTATIN SODIUM 40 MG: 20 TABLET ORAL at 19:10

## 2023-12-04 RX ADMIN — PHENYLEPHRINE HYDROCHLORIDE 100 MCG: 10 INJECTION INTRAVENOUS at 11:41

## 2023-12-04 RX ADMIN — PHENYLEPHRINE HYDROCHLORIDE 100 MCG: 10 INJECTION INTRAVENOUS at 13:00

## 2023-12-04 RX ADMIN — OXYCODONE HYDROCHLORIDE 5 MG: 5 TABLET ORAL at 16:13

## 2023-12-04 RX ADMIN — PHENYLEPHRINE HYDROCHLORIDE 100 MCG: 10 INJECTION INTRAVENOUS at 11:58

## 2023-12-04 RX ADMIN — PHENYLEPHRINE HYDROCHLORIDE 200 MCG: 10 INJECTION INTRAVENOUS at 14:02

## 2023-12-04 RX ADMIN — HYDROMORPHONE HYDROCHLORIDE 0.5 MG: 1 INJECTION, SOLUTION INTRAMUSCULAR; INTRAVENOUS; SUBCUTANEOUS at 13:27

## 2023-12-04 RX ADMIN — ACETAMINOPHEN 650 MG: 325 TABLET, FILM COATED ORAL at 15:17

## 2023-12-04 RX ADMIN — DEXAMETHASONE SODIUM PHOSPHATE 4 MG: 4 INJECTION, SOLUTION INTRA-ARTICULAR; INTRALESIONAL; INTRAMUSCULAR; INTRAVENOUS; SOFT TISSUE at 11:43

## 2023-12-04 RX ADMIN — SODIUM CHLORIDE, POTASSIUM CHLORIDE, SODIUM LACTATE AND CALCIUM CHLORIDE: 600; 310; 30; 20 INJECTION, SOLUTION INTRAVENOUS at 12:32

## 2023-12-04 RX ADMIN — PHENYLEPHRINE HYDROCHLORIDE 100 MCG: 10 INJECTION INTRAVENOUS at 13:14

## 2023-12-04 RX ADMIN — FENTANYL CITRATE 25 MCG: 50 INJECTION INTRAMUSCULAR; INTRAVENOUS at 16:07

## 2023-12-04 ASSESSMENT — ACTIVITIES OF DAILY LIVING (ADL)
FALL_HISTORY_WITHIN_LAST_SIX_MONTHS: NO
ADLS_ACUITY_SCORE: 22
DRESSING/BATHING_DIFFICULTY: NO
DIFFICULTY_COMMUNICATING: NO
DIFFICULTY_EATING/SWALLOWING: NO
CONCENTRATING,_REMEMBERING_OR_MAKING_DECISIONS_DIFFICULTY: NO
ADLS_ACUITY_SCORE: 22
VISION_MANAGEMENT: GLASSES
HEARING_DIFFICULTY_OR_DEAF: YES
ADLS_ACUITY_SCORE: 22
ADLS_ACUITY_SCORE: 22
DOING_ERRANDS_INDEPENDENTLY_DIFFICULTY: NO
WALKING_OR_CLIMBING_STAIRS_DIFFICULTY: NO
USE_OF_HEARING_ASSISTIVE_DEVICES: BILATERAL HEARING AIDS
WEAR_GLASSES_OR_BLIND: YES
ADLS_ACUITY_SCORE: 22
WERE_AUXILIARY_AIDS_OFFERED?: NO
TOILETING_ISSUES: NO
THE_FOLLOWING_AIDS_WERE_PROVIDED;: PATIENT DECLINED OFFER OF AUXILIARY AIDS
ADLS_ACUITY_SCORE: 22
PATIENT'S_PREFERRED_MEANS_OF_COMMUNICATION: ENGLISH SPEAKER WITH HEARING LOSS, NO SPEECH PROBLEMS.
CHANGE_IN_FUNCTIONAL_STATUS_SINCE_ONSET_OF_CURRENT_ILLNESS/INJURY: NO
DESCRIBE_HEARING_LOSS: BILATERAL HEARING LOSS
HEARING_MANAGEMENT: BILATERAL HEARING AIDS
ADLS_ACUITY_SCORE: 22

## 2023-12-04 ASSESSMENT — ENCOUNTER SYMPTOMS: DYSRHYTHMIAS: 0

## 2023-12-04 NOTE — ANESTHESIA CARE TRANSFER NOTE
Patient: Agustin Nolan    Procedure: Procedure(s):  PROSTATECTOMY, ROBOT-ASSISTED, USING DA AIDA XI, WITH PELVIC LYMPHADENECTOMY, LYSIS OF ADHESIONS       Diagnosis: Prostate cancer (H) [C61]  Diagnosis Additional Information: No value filed.    Anesthesia Type:   General     Note:    Oropharynx: oropharynx clear of all foreign objects and spontaneously breathing  Level of Consciousness: drowsy  Oxygen Supplementation: face mask  Level of Supplemental Oxygen (L/min / FiO2): 6  Independent Airway: airway patency satisfactory and stable  Dentition: dentition unchanged  Vital Signs Stable: post-procedure vital signs reviewed and stable  Report to RN Given: handoff report given  Patient transferred to: PACU    Handoff Report: Identifed the Patient, Identified the Reponsible Provider, Reviewed the pertinent medical history, Discussed the surgical course, Reviewed Intra-OP anesthesia mangement and issues during anesthesia, Set expectations for post-procedure period and Allowed opportunity for questions and acknowledgement of understanding      Vitals:  Vitals Value Taken Time   /78 12/04/23 1435   Temp     Pulse 75 12/04/23 1435   Resp 15 12/04/23 1435   SpO2 99 % 12/04/23 1435       Electronically Signed By: DOUGLAS Varela CRNA  December 4, 2023  2:36 PM

## 2023-12-04 NOTE — INTERVAL H&P NOTE
"I have reviewed the surgical (or preoperative) H&P that is linked to this encounter, and examined the patient. There are no significant changes    Clinical Conditions Present on Arrival:  Clinically Significant Risk Factors Present on Admission                 # Drug Induced Platelet Defect: home medication list includes an antiplatelet medication  # DMII: A1C = 7.5 % (Ref range: <5.7 %) within past 6 months  # Overweight: Estimated body mass index is 27.05 kg/m  as calculated from the following:    Height as of 11/28/23: 1.74 m (5' 8.5\").    Weight as of this encounter: 81.9 kg (180 lb 8 oz).       "

## 2023-12-04 NOTE — ANESTHESIA PROCEDURE NOTES
Airway       Patient location during procedure: OR       Procedure Start/Stop Times: 12/4/2023 11:29 AM  Staff -        CRNA: Sagar León APRN CRNA       Performed By: CRNAIndications and Patient Condition       Indications for airway management: payton-procedural and airway protection       Induction type:intravenous       Mask difficulty assessment: 1 - vent by mask    Final Airway Details       Final airway type: endotracheal airway       Successful airway: ETT - single  Endotracheal Airway Details        ETT size (mm): 8.0       Cuffed: yes       Successful intubation technique: direct laryngoscopy       DL Blade Type: MAC 3       Grade View of Cords: 1       Adjucts: stylet       Position: Right       Measured from: gums/teeth       Secured at (cm): 24       Bite block used: None    Post intubation assessment        Placement verified by: capnometry, equal breath sounds and chest rise        Number of attempts at approach: 1       Secured with: tape       Ease of procedure: easy       Dentition: Intact    Medication(s) Administered   Medication Administration Time: 12/4/2023 11:29 AM

## 2023-12-04 NOTE — ANESTHESIA POSTPROCEDURE EVALUATION
Patient: Agustin Nolan    Procedure: Procedure(s):  PROSTATECTOMY, ROBOT-ASSISTED, USING DA AIDA XI, WITH PELVIC LYMPHADENECTOMY, LYSIS OF ADHESIONS       Anesthesia Type:  General    Note:  Disposition: Inpatient   Postop Pain Control: Uneventful            Sign Out: Well controlled pain   PONV: No   Neuro/Psych: Uneventful            Sign Out: Acceptable/Baseline neuro status   Airway/Respiratory: Uneventful            Sign Out: Acceptable/Baseline resp. status   CV/Hemodynamics: Uneventful            Sign Out: Acceptable CV status; No obvious hypovolemia; No obvious fluid overload   Other NRE:    DID A NON-ROUTINE EVENT OCCUR?            Last vitals:  Vitals Value Taken Time   /91 12/04/23 1634   Temp 98.1  F (36.7  C) 12/04/23 1630   Pulse 87 12/04/23 1652   Resp 14 12/04/23 1652   SpO2 91 % 12/04/23 1652   Vitals shown include unfiled device data.    Electronically Signed By: Cruz Panchal MD  December 4, 2023  5:02 PM

## 2023-12-04 NOTE — OP NOTE
SURGEON:  Kamlesh Henry MD     ASSISTANT: Viviana Rutledge     PREOPERATIVE DIAGNOSIS:  Prostate cancer.     POSTOPERATIVE DIAGNOSIS:  Prostate cancer.     PROCEDURE PERFORMED:  Robotic-assisted laparoscopic radical prostatectomy with bilateral pelvic lymph node dissection.  Robotic assisted lysis of adhesions      ANESTHESIA:  General.     COMPLICATIONS:  None.     SPECIMENS:    1.  Prostate and seminal vesicles  2.  Bilateral pelvic lymph nodes.     FINDINGS: Bilateral wide excision procedure.  Large amount of abdominal lesions present over the patient's prior appendectomy scar.  30 minutes spent at the beginning of case taking down adhesions.     INDICATIONS FOR PROCEDURE: This is a 78-year-old gentleman with a low-grade prostate cancer who is seen a rise in his PSA and has seen an enlarging of a lesion seen on MRI of the prostate.  He  has been counseled on his treatment options and has decided to undergo a robotic prostatectomy today.    DETAILS OF PROCEDURE: The patient was brought to the operating room where he underwent general endotracheal anesthetic.  He was secured to the table with tape.  A gupta device was placed to retract the head.  The abdomen was shaved.  The abdomen and penis were prepped and draped in the standard sterile fashion.    After appropriate timeout I inserted an 18 Latvian Petersen catheter to drain the bladder.  The umbilicus was tented up and a Veress needle was placed at the umbilicus.  I achieved 15mm of pressure with CO2 insufflation.  I then made an incision above umbilicus and placed a robotic trocar at this site.  I inserted the robotic camera and inspected the abdomen.  No injury had occurred.  On the right side of the abdomen, beneath his open appendectomy scar, he had a great deal of adhesions and small bowel adhesed up to the anterior abdominal wall.  Under direct visualization I could only place 3 out of 4 of my robotic trocars because the site where I prefer to place the  right-sided fourth arm was obscured by small bowel.  I decided I would do a lysis of adhesions after gaining access and then placed the right sided port..  An air seal assistant port was placed to the left of the camera port.  The patient was then brought to the 25 degree in Trendelenburg position and the robot was docked.    I looked over towards the right side at the small bowel adhesions that were adhesed to the anterior abdominal wall.  I used the cold scissors to take down all adhesions.  30 minutes was spent getting down all the adhesions.  I could then have my assistant placed the fourth arm trocar under direct visualization.  Adhesions of the sigmoid colon were taken down and the sigmoid colon was tented up.  I incised anteriorly into the peritoneal reflection to identify seminal vesicles and vas deferens bilaterally.  The 4 structures were cleared of their surrounding tissues and the vas deferens was cut on each side.  I then released my retraction on sigmoid colon.  I then incised through the medial umbilical ligament on each side to develop the space of Retzius and take down the bladder.  I identified pubic arch and endopelvic fascia.  The superficial dorsal vein of the prostate was taken down with cautery.  I removed adipose tissue overlying the endopelvic fascia.  I then incised endopelvic fascia laterally on each side to identify the lateral limits of the prostate.  A 2-0 V-Loc suture was then placed twice around dorsal venous complex for hemostatic purposes later in the case.  I cut the needle and removed it from the body.    I next pulled on the Petersen catheter to identify the prostato-vesicle junction.  I incised anteriorly into the bladder neck to dissect the bladder neck free from the anterior base of the prostate.  I continued this dissection until I reached my Petersen catheter.  The Petersen catheter balloon was brought down and brought through the plane of the dissection for retraction purposes.  I  then dissected the posterior bladder neck free from the posterior base of the prostate until I reached my seminal vesicles and vas deferens.    I next tented up the seminal vesicles and vas deferens and incised through Denonvilliers's fascia to identify the posterior capsule of the prostate.  I cleared off the posterior capsule of the prostate all the way to the apex of the prostate on each side.  I then incised through the pedicles of the prostate with a series of Weck clips.  I did not spare the erectile nerves and.  I clipped the erectile nerves with a series of Weck clips to the apex of the prostate on each side.    I looked anteriorly and I incised through dorsal venous complex.  I then dissected out the urethra and cut through urethra sharply.  The specimen was placed in an Endo Catch specimen bag through the assistant port and set aside for the time being.    I next performed a bilateral pelvic lymph node dissection by dissecting free the lymph node packet between the external iliac vein and the obturator nerve on each side.  This was taken down with a series of Weck clips on each side.    I performed by vesicourethral reanastomosis by running a double-armed 3-O V lock suture being at the 6:00 and ending at the 12 o'clock position on each side.  The needles were cut and removed from the body.  I placed a new 18 Maori Petersen catheter per urethra into the bladder.  I filled the balloon with 15 cc of sterile water.  I had irrigated the bladder until output was clear.  There was no leakage from the anastomosis.  I next removed by left-sided instrument and placed a Patric-Matthews drain at the site.  I removed the trocar from the site intact in the Patric-Matthews drain with a 2-0 silk suture.  All instruments were removed and the robot was de docked.  The patient was brought out of Trendelenburg position.  I increased the size of the supraumbilical incision so that I could bring the Endo Catch specimen out through  this site.  I reclosed fascia at this site with #1 PDS figure-of-eight sutures.  All skin incisions were irrigated and closed with 4-0 Monocryl subcuticular stitches.  These were all covered with skin adhesive.  A drain sponge was placed over the Patric-Matthews site.  The procedure was concluded at this point.    The patient tolerated the procedure well without complications.  He went to the postanesthetic care unit in good condition.  He will be admitted to the hospital floor from there.

## 2023-12-04 NOTE — ANESTHESIA PREPROCEDURE EVALUATION
Anesthesia Pre-Procedure Evaluation    Patient: Agustin Nolan   MRN: [id number] : 1945        Procedure : Procedure(s):  PROSTATECTOMY, ROBOT-ASSISTED, USING DA AIDA XI, WITH PELVIC LYMPHADENECTOMY          Past Medical History:   Diagnosis Date    Esophageal reflux       Past Surgical History:   Procedure Laterality Date    APPENDECTOMY      cystoscopy with pyeloscopy with removal of calculus.      PROSTATE SURGERY      ROTATOR CUFF REPAIR RT/LT      surgery testis exploration of undescended.  1985    TESTICLE SURGERY      VASECTOMY        Allergies   Allergen Reactions    Cats      Hard time breathing, running nose      Social History     Tobacco Use    Smoking status: Never    Smokeless tobacco: Never   Substance Use Topics    Alcohol use: Yes     Alcohol/week: 1.0 standard drink of alcohol     Types: 1 Standard drinks or equivalent per week      Wt Readings from Last 1 Encounters:   23 81.9 kg (180 lb 8 oz)        Anesthesia Evaluation            ROS/MED HX  ENT/Pulmonary:    (-) sleep apnea   Neurologic:     (+)    peripheral neuropathy,                            Cardiovascular:     (+)  - -   -  - -                                 Previous cardiac testing   Echo: Date: Results:    Stress Test:  Date: Results:    ECG Reviewed:  Date:  Results:  nl  Cath:  Date: Results:   (-) hypertension, CAD, CHF, arrhythmias and dyslipidemia   METS/Exercise Tolerance:     Hematologic:       Musculoskeletal:    (-) arthritis   GI/Hepatic:     (+) GERD,                   Renal/Genitourinary:       Endo:     (+)  type II DM, Last HgA1c: 7.3,                   Psychiatric/Substance Use:       Infectious Disease:       Malignancy:   (+) Malignancy, History of Prostate.Prostate CA Active status post.      Other:            Physical Exam    Airway        Mallampati: II   TM distance: > 3 FB   Neck ROM: full     Respiratory Devices and Support         Dental           Cardiovascular   cardiovascular exam normal   "        Pulmonary   pulmonary exam normal            Other findings: Lab Test        11/28/23                       1555          WBC          6.9           HGB          15.1          MCV          90            PLT          306            Lab Test        11/28/23 05/02/23 04/27/22                       1555          1343          1427          NA           139          140          138           POTASSIUM    4.3          3.9          4.3           CHLORIDE     101          99           104           CO2          28 25 28            BUN          16.3         13.0         15            CR           1.04         1.02         1.00          ANIONGAP     10           16*          6             CHERI          9.7          9.9          9.5           GLC          149*         134*         139*              OUTSIDE LABS:  CBC:   Lab Results   Component Value Date    WBC 6.9 11/28/2023    WBC 12.8 (H) 09/14/2007    HGB 15.1 11/28/2023    HGB 16.4 09/14/2007    HCT 47.4 11/28/2023    HCT 48.3 09/14/2007     11/28/2023     09/14/2007     BMP:   Lab Results   Component Value Date     11/28/2023     05/02/2023    POTASSIUM 4.3 11/28/2023    POTASSIUM 3.9 05/02/2023    CHLORIDE 101 11/28/2023    CHLORIDE 99 05/02/2023    CO2 28 11/28/2023    CO2 25 05/02/2023    BUN 16.3 11/28/2023    BUN 13.0 05/02/2023    CR 1.04 11/28/2023    CR 1.02 05/02/2023     (H) 11/28/2023     (H) 05/02/2023     COAGS: No results found for: \"PTT\", \"INR\", \"FIBR\"  POC: No results found for: \"BGM\", \"HCG\", \"HCGS\"  HEPATIC:   Lab Results   Component Value Date    ALBUMIN 4.6 (H) 09/14/2007    PROTTOTAL 8.2 09/14/2007    ALT 32 05/02/2023    AST 26.0 03/19/2019    ALKPHOS 75 09/14/2007    BILITOTAL 0.9 09/14/2007     OTHER:   Lab Results   Component Value Date    A1C 7.5 (H) 11/28/2023    CHERI 9.7 11/28/2023    TSH 4.07 05/02/2023    T4 0.92 04/16/2021       Anesthesia Plan    ASA Status:  2     " "  Anesthesia Type: General.     - Airway: ETT   Induction: Propofol.   Maintenance: Balanced.        Consents    Anesthesia Plan(s) and associated risks, benefits, and realistic alternatives discussed. Questions answered and patient/representative(s) expressed understanding.     - Discussed: Risks, Benefits and Alternatives for the PROCEDURE were discussed     - Discussed with:  Patient      - Extended Intubation/Ventilatory Support Discussed: No.      - Patient is DNR/DNI Status: No     Use of blood products discussed: Yes.     - Discussed with: Patient.     - Consented: consented to blood products     Postoperative Care    Pain management: IV analgesics, Oral pain medications.   PONV prophylaxis: Ondansetron (or other 5HT-3), Background Propofol Infusion     Comments:               Cruz Panchal MD    I have reviewed the pertinent notes and labs in the chart from the past 30 days and (re)examined the patient.  Any updates or changes from those notes are reflected in this note.             # Drug Induced Platelet Defect: home medication list includes an antiplatelet medication  # DMII: A1C = 7.5 % (Ref range: <5.7 %) within past 6 months  # Overweight: Estimated body mass index is 27.05 kg/m  as calculated from the following:    Height as of 11/28/23: 1.74 m (5' 8.5\").    Weight as of this encounter: 81.9 kg (180 lb 8 oz).      "

## 2023-12-05 LAB
ANION GAP SERPL CALCULATED.3IONS-SCNC: 8 MMOL/L (ref 7–15)
BUN SERPL-MCNC: 11 MG/DL (ref 8–23)
CALCIUM SERPL-MCNC: 8.2 MG/DL (ref 8.8–10.2)
CHLORIDE SERPL-SCNC: 99 MMOL/L (ref 98–107)
CREAT SERPL-MCNC: 0.88 MG/DL (ref 0.67–1.17)
DEPRECATED HCO3 PLAS-SCNC: 26 MMOL/L (ref 22–29)
EGFRCR SERPLBLD CKD-EPI 2021: 89 ML/MIN/1.73M2
ERYTHROCYTE [DISTWIDTH] IN BLOOD BY AUTOMATED COUNT: 13.7 % (ref 10–15)
GLUCOSE SERPL-MCNC: 168 MG/DL (ref 70–99)
HCT VFR BLD AUTO: 41.4 % (ref 40–53)
HGB BLD-MCNC: 13.5 G/DL (ref 13.3–17.7)
MCH RBC QN AUTO: 29.6 PG (ref 26.5–33)
MCHC RBC AUTO-ENTMCNC: 32.6 G/DL (ref 31.5–36.5)
MCV RBC AUTO: 91 FL (ref 78–100)
PLATELET # BLD AUTO: 257 10E3/UL (ref 150–450)
POTASSIUM SERPL-SCNC: 4.2 MMOL/L (ref 3.4–5.3)
RBC # BLD AUTO: 4.56 10E6/UL (ref 4.4–5.9)
SODIUM SERPL-SCNC: 133 MMOL/L (ref 135–145)
WBC # BLD AUTO: 12.1 10E3/UL (ref 4–11)

## 2023-12-05 PROCEDURE — 250N000011 HC RX IP 250 OP 636: Performed by: UROLOGY

## 2023-12-05 PROCEDURE — 80048 BASIC METABOLIC PNL TOTAL CA: CPT | Performed by: UROLOGY

## 2023-12-05 PROCEDURE — 85027 COMPLETE CBC AUTOMATED: CPT | Performed by: UROLOGY

## 2023-12-05 PROCEDURE — 250N000013 HC RX MED GY IP 250 OP 250 PS 637: Performed by: STUDENT IN AN ORGANIZED HEALTH CARE EDUCATION/TRAINING PROGRAM

## 2023-12-05 PROCEDURE — 36415 COLL VENOUS BLD VENIPUNCTURE: CPT | Performed by: UROLOGY

## 2023-12-05 PROCEDURE — 96372 THER/PROPH/DIAG INJ SC/IM: CPT | Performed by: UROLOGY

## 2023-12-05 PROCEDURE — 250N000013 HC RX MED GY IP 250 OP 250 PS 637: Performed by: UROLOGY

## 2023-12-05 RX ORDER — CIPROFLOXACIN 500 MG/1
500 TABLET, FILM COATED ORAL ONCE
Qty: 1 TABLET | Refills: 0 | Status: SHIPPED | OUTPATIENT
Start: 2023-12-05 | End: 2023-12-05

## 2023-12-05 RX ORDER — AMOXICILLIN 250 MG
1 CAPSULE ORAL 2 TIMES DAILY PRN
Qty: 30 TABLET | Refills: 0 | Status: SHIPPED | OUTPATIENT
Start: 2023-12-05 | End: 2024-06-25

## 2023-12-05 RX ORDER — DIPHENHYDRAMINE HYDROCHLORIDE 50 MG/ML
25 INJECTION INTRAMUSCULAR; INTRAVENOUS EVERY 6 HOURS PRN
Status: DISCONTINUED | OUTPATIENT
Start: 2023-12-05 | End: 2023-12-06 | Stop reason: HOSPADM

## 2023-12-05 RX ORDER — OXYCODONE HYDROCHLORIDE 5 MG/1
5 TABLET ORAL EVERY 6 HOURS PRN
Qty: 12 TABLET | Refills: 0 | Status: ON HOLD | OUTPATIENT
Start: 2023-12-05 | End: 2023-12-10

## 2023-12-05 RX ORDER — DIPHENHYDRAMINE HCL 25 MG
25 CAPSULE ORAL EVERY 6 HOURS PRN
Status: DISCONTINUED | OUTPATIENT
Start: 2023-12-05 | End: 2023-12-06 | Stop reason: HOSPADM

## 2023-12-05 RX ADMIN — HEPARIN SODIUM 5000 UNITS: 5000 INJECTION, SOLUTION INTRAVENOUS; SUBCUTANEOUS at 08:05

## 2023-12-05 RX ADMIN — PRAVASTATIN SODIUM 40 MG: 20 TABLET ORAL at 20:38

## 2023-12-05 RX ADMIN — DOCUSATE SODIUM 100 MG: 100 CAPSULE, LIQUID FILLED ORAL at 08:05

## 2023-12-05 RX ADMIN — OXYCODONE HYDROCHLORIDE 5 MG: 5 TABLET ORAL at 12:50

## 2023-12-05 RX ADMIN — METFORMIN HYDROCHLORIDE 500 MG: 500 TABLET, EXTENDED RELEASE ORAL at 17:25

## 2023-12-05 RX ADMIN — OXYCODONE HYDROCHLORIDE 5 MG: 5 TABLET ORAL at 18:48

## 2023-12-05 RX ADMIN — DIPHENHYDRAMINE HYDROCHLORIDE 25 MG: 25 CAPSULE ORAL at 06:35

## 2023-12-05 RX ADMIN — PANTOPRAZOLE SODIUM 40 MG: 40 TABLET, DELAYED RELEASE ORAL at 08:05

## 2023-12-05 RX ADMIN — CETIRIZINE HYDROCHLORIDE 10 MG: 10 TABLET, FILM COATED ORAL at 08:05

## 2023-12-05 RX ADMIN — DIPHENHYDRAMINE HYDROCHLORIDE 25 MG: 25 CAPSULE ORAL at 12:50

## 2023-12-05 RX ADMIN — DOCUSATE SODIUM 100 MG: 100 CAPSULE, LIQUID FILLED ORAL at 20:38

## 2023-12-05 ASSESSMENT — ACTIVITIES OF DAILY LIVING (ADL)
ADLS_ACUITY_SCORE: 22

## 2023-12-05 NOTE — PROGRESS NOTES
[facility] Progress Note          Assessment and Plan:     Assessment:     POD 1  Robotic-assisted laparoscopic radical prostatectomy with bilateral pelvic lymph node dissection.  Robotic assisted lysis of adhesions     Prostate cancer (H)      Plan:   -Ambulate.  -Can discontinue fluids and saline lock.  -Advance diet as tolerated.  -Continue to monitor for signs of allergic reaction.  -Anticipate possible discharge this afternoon WITH Petersen catheter in place.  Plan to remove JOSE drain prior to discharge.    Precious Toro PA-C   [facility]  [phone]               Interval History:     Doing okay. Coughed so some abdominal discomfort.  Petersen catheter in place with clear yellow urine.  Good output.  Incisions C/D/I.  No gas or BM.  Denies N/V/F/C/SOB/CP.   JOSE drain with bloody serosanguineous fluid.  85 mL of output.  Patient had itching, hotness and rash in the scrotal area.  Given a dose of Benadryl and this resolved.  Concern for reaction to iodine.  No signs concerning for anaphylaxis.  Slept well.  Hemoglobin 13.5.  WBC 12.1.  Tolerated breakfast. Creatinine 0.88 eGFR 89.              Review of Systems:     The 5 point Review of Systems is negative other than noted in the HPI             Medications:     Current Facility-Administered Medications Ordered in Epic   Medication Dose Route Frequency Last Rate Last Admin    cetirizine (zyrTEC) tablet 10 mg  10 mg Oral Daily   10 mg at 12/05/23 0805    diphenhydrAMINE (BENADRYL) capsule 25 mg  25 mg Oral Q6H PRN   25 mg at 12/05/23 0635    Or    diphenhydrAMINE (BENADRYL) injection 25 mg  25 mg Intravenous Q6H PRN        docusate sodium (COLACE) capsule 100 mg  100 mg Oral BID   100 mg at 12/05/23 0805    heparin ANTICOAGULANT injection 5,000 Units  5,000 Units Subcutaneous Q12H   5,000 Units at 12/05/23 0805    lidocaine (LMX4) cream   Topical Q1H PRN        lidocaine 1 % 0.1-1 mL  0.1-1 mL Other Q1H PRN        metFORMIN  "(GLUCOPHAGE XR) 24 hr tablet 500 mg  500 mg Oral Daily with supper   500 mg at 12/04/23 1910    naloxone (NARCAN) injection 0.2 mg  0.2 mg Intravenous Q2 Min PRN        Or    naloxone (NARCAN) injection 0.4 mg  0.4 mg Intravenous Q2 Min PRN        Or    naloxone (NARCAN) injection 0.2 mg  0.2 mg Intramuscular Q2 Min PRN        Or    naloxone (NARCAN) injection 0.4 mg  0.4 mg Intramuscular Q2 Min PRN        ondansetron (ZOFRAN ODT) ODT tab 4 mg  4 mg Oral Q6H PRN        Or    ondansetron (ZOFRAN) injection 4 mg  4 mg Intravenous Q6H PRN        oxyCODONE (ROXICODONE) tablet 5 mg  5 mg Oral Q4H PRN   5 mg at 12/04/23 1613    Or    oxyCODONE (ROXICODONE) tablet 10 mg  10 mg Oral Q4H PRN        pantoprazole (PROTONIX) EC tablet 40 mg  40 mg Oral QAM AC   40 mg at 12/05/23 0805    pravastatin (PRAVACHOL) tablet 40 mg  40 mg Oral QPM   40 mg at 12/04/23 1910    prochlorperazine (COMPAZINE) injection 5 mg  5 mg Intravenous Q6H PRN        Or    prochlorperazine (COMPAZINE) tablet 5 mg  5 mg Oral Q6H PRN        sodium chloride (PF) 0.9% PF flush 3 mL  3 mL Intracatheter Q8H        sodium chloride (PF) 0.9% PF flush 3 mL  3 mL Intracatheter q1 min prn         Current Outpatient Medications Ordered in Epic   Medication    acetaminophen (TYLENOL) 325 MG tablet    hydrOXYzine HCl (ATARAX) 10 MG tablet    ondansetron (ZOFRAN ODT) 4 MG ODT tab                  Physical Exam:   Vitals were reviewed  Patient Vitals for the past 8 hrs:   BP Temp Temp src Pulse Resp SpO2   12/05/23 0628 117/71 98.4  F (36.9  C) Oral 70 18 91 %     GEN: NAD, lying in bed  EYES: EOMI  MOUTH: MMM  NECK: Supple  RESP: Unlabored breathing  SKIN: Warm  ABD: soft, appropriately tender, nondistended, incisions C/D/I.  JOSE drain with bloody serosanguineous fluid.  NEURO: AAO  URO: Petesren catheter in place draining clear yellow urine. Scrotum-nontender.  No hotness.  Normal coloration.           Data:   No results found for: \"NTBNPI\", \"NTBNP\"  Lab Results "   Component Value Date    WBC 12.1 (H) 12/05/2023    WBC 6.9 11/28/2023    WBC 12.8 (H) 09/14/2007    HGB 13.5 12/05/2023    HGB 15.1 11/28/2023    HGB 16.4 09/14/2007    HCT 41.4 12/05/2023    HCT 47.4 11/28/2023    HCT 48.3 09/14/2007    MCV 91 12/05/2023    MCV 90 11/28/2023    MCV 89 09/14/2007     12/05/2023     11/28/2023     09/14/2007

## 2023-12-05 NOTE — PLAN OF CARE
"VSS on room air. Afebrile. Up with SBA. A&Ox4. Tolerating clear liquids. Ponce patent. JOSE x1 to Left side abdomen. 4x  lap sites with glue. CDI.  IVF @100. Capno overnight.     Major Shift Events:   c/o \"hot\" feeling in scrotum& flushed face, slightly red- no rash noted, cleansed, ice pack applied. While questioning patient, he states that he forgot he had a previous allergica reaction to eating crab meat. Questioning allergic reaction to Iodine? MD paged for benadryl. Otherwise uneventful night, slept well. No significant change in condition.     Treatment Plan:   IV fluids, capno, ponce, JOSE x1, pain management, Urology following, advance diet, await return of bowel function, and discharge pending.  "

## 2023-12-05 NOTE — PROVIDER NOTIFICATION
"Paged  on call Urologist to request Benadryl for possible iodine reaction in groin. Pt c/o scrotum feeling \"hot\" and also feels flushed in face. He forgot to report a previous allergic reaction to crab meat. Allergy list updated in EPIC.   "

## 2023-12-05 NOTE — DISCHARGE INSTRUCTIONS
"Discharge Diet:   -Regular    Activity:   - No strenuous exercise for 6 weeks.   - No lifting, pushing, pulling more than 10 pounds for 6 weeks. Take care when pushing with your arms to stand up.  - Do not strain your belly area.  When you bend, sit up or twice, you could strain the area around your incision.    - Do not strain with bowel movements.    - Do not drive until you can press the brake pedal quickly and fully without pain.   - Do not operate a motor vehicle while taking narcotic pain medications.     Medications:   1) PAIN: Oxycodone is a narcotic medication that has been prescribed for pain.  Narcotics will cause sleepiness and constipation, therefore it is best to stop or reduce them as soon as you can and switch to using acetaminophen (Tylenol) and/or ibuprofen (Advil/Motrin), taken as directed on the packaging.  Keep in mind that certain narcotics can contain acetaminophen, also called \"APAP\" on prescription bottles.  Do not take more than 4,000mg of Tylenol (acetaminophen/ APAP) from all sources in any 24 hour period since this can cause liver damage.  Never drive, operate machinery or drink alcoholic beverages while you are taking narcotic pain medications.     2) CONSTIPATION: Pericolace (senna/docusate sodium) can be taken twice daily for prevention of constipation since surgery, pain medications and bladder spasm medications can all make you constipated.  Please reduce or stop pericolace if you develop loose stools. Other over the counter solutions such as prune juice, miralax, fiber products, senna, and dulcolax can also be used. If you are taking the pericolace but still have not had a bowel movement in 3 days, start over-the-counter Milk of Magnesia taken twice daily until you have a nice bowel movement.  Call the office with any concerns.     3) ANTIBIOTICS  - Ciprofloxacin 500mg #1 tablet should be taken one hour prior to your followup appointment with Clarissa La to remove your Petersen " catheter    Wound Care:   - You may shower and get incisions wet starting 48 hrs after surgery.  - Do not scrub incisions or submerge wounds (aka, bath, pool, hot tub, etc.) for 2 weeks or until wounds have healed and catheter is removed.  - Remove wound dressing 48 hours after surgery if already not removed.   - If purple dermabond glue was used, avoid applying any lotions or ointments.   - Leave incision open to air.  Cover with gauze only if needed for comfort or to protect clothing from drainage.     Drains:  1) Petersen Catheter: You are going home with a Petersen catheter which will remain in place until your follow-up appointment. Your nurse or  will provide written catheter care instructions for you to take home.  - Protect the catheter and treat it like an extension of your body - keep it secured to your leg and do not let it get caught, snagged, or tugged.  - Should the catheter somehow come out of position, do not let anyone but a urologist who is aware of your recent surgery try to replace a catheter.  Best yet, contact our urology office right away with any concerns.     Follow-Up:   - Call your primary care provider to touch base regarding your recent admission.   - Follow up with Clarissa La CNP, as scheduled on 12/14/23  - Call or return sooner than your regularly scheduled visit if you develop any of the following:  Fever (greater than 101.3F), uncontrolled pain, uncontrolled nausea or vomiting, as well as increased redness, swelling, or drainage from your wound.    Phone numbers:  - Nursing phone helpline at the [facility] (8A-5P M-F):  [phone]  - Nights or weekends, call [phone] and ask the  to page the urology resident on call.   - For emergencies, always call [phone]

## 2023-12-05 NOTE — PLAN OF CARE
Pertinent assessments: VSS on room air. Afebrile. Up with SBA. A&Ox4. Tolerating clear liquids. Ponce patent to collection bag. JOSE x1 to Left side abdomen. X4 surgical sites closed with skin glue. IV fluids infusing per orders. Capno monitoring in place.     Major Shift Events: new admit from PACU.    Treatment Plan: IV fluids, capno, ponce, JOSE x1, pain management, Urology following, advance diet, await return of bowel function, and discharge pending.    Bedside Nurse: Terry Thompson RN    none

## 2023-12-06 ENCOUNTER — CARE COORDINATION (OUTPATIENT)
Dept: UROLOGY | Facility: CLINIC | Age: 78
End: 2023-12-06
Payer: MEDICARE

## 2023-12-06 VITALS
DIASTOLIC BLOOD PRESSURE: 91 MMHG | SYSTOLIC BLOOD PRESSURE: 136 MMHG | WEIGHT: 180.5 LBS | TEMPERATURE: 97.7 F | HEART RATE: 109 BPM | OXYGEN SATURATION: 95 % | RESPIRATION RATE: 20 BRPM | BODY MASS INDEX: 27.05 KG/M2

## 2023-12-06 LAB — GLUCOSE BLDC GLUCOMTR-MCNC: 190 MG/DL (ref 70–99)

## 2023-12-06 PROCEDURE — 250N000013 HC RX MED GY IP 250 OP 250 PS 637: Performed by: INTERNAL MEDICINE

## 2023-12-06 PROCEDURE — 250N000013 HC RX MED GY IP 250 OP 250 PS 637: Performed by: UROLOGY

## 2023-12-06 PROCEDURE — 82962 GLUCOSE BLOOD TEST: CPT

## 2023-12-06 RX ORDER — POLYETHYLENE GLYCOL 3350 17 G/17G
17 POWDER, FOR SOLUTION ORAL DAILY PRN
Status: DISCONTINUED | OUTPATIENT
Start: 2023-12-06 | End: 2023-12-06 | Stop reason: HOSPADM

## 2023-12-06 RX ADMIN — PANTOPRAZOLE SODIUM 40 MG: 40 TABLET, DELAYED RELEASE ORAL at 08:32

## 2023-12-06 RX ADMIN — POLYETHYLENE GLYCOL 3350 17 G: 17 POWDER, FOR SOLUTION ORAL at 05:28

## 2023-12-06 RX ADMIN — DOCUSATE SODIUM 100 MG: 100 CAPSULE, LIQUID FILLED ORAL at 08:32

## 2023-12-06 RX ADMIN — MAGNESIUM HYDROXIDE 30 ML: 400 SUSPENSION ORAL at 08:32

## 2023-12-06 RX ADMIN — CETIRIZINE HYDROCHLORIDE 10 MG: 10 TABLET, FILM COATED ORAL at 08:32

## 2023-12-06 ASSESSMENT — ACTIVITIES OF DAILY LIVING (ADL)
ADLS_ACUITY_SCORE: 22

## 2023-12-06 NOTE — DISCHARGE SUMMARY
Discharge Note    Patient discharged to home via private vehicle  accompanied by Friend.  IV: Discontinued  Prescriptions filled and given to patient/family.   Belongings reviewed and sent with patient.   Home medications returned to patient: NA  Equipment sent with: patient, N/A.   patient verbalizes understanding of discharge instructions. AVS given to patient.  Additional education completed? Ponce Catheter Care  Pt had ponce cares done and JOSE removed and pt educated on how to take care of site. Instructions on how to to take care of ponce.

## 2023-12-06 NOTE — PROGRESS NOTES
[facility] Progress Note          Assessment and Plan:     Assessment:     POD 2  Robotic-assisted laparoscopic radical prostatectomy with bilateral pelvic lymph node dissection.  Robotic assisted lysis of adhesions     Prostate cancer (H)      Plan:   -Ambulate. Encouraged more movement.  -Can discontinue fluids and saline lock.  -Advance diet as tolerated.  -Colace and Miralax to help with stool.  -Anticipate possible discharge this afternoon (pending possible BM or gas) WITH Petersen catheter in place.  Plan to remove JOSE drain prior to discharge.    Precious Toro PA-C   [facility]  [phone]               Interval History:     Doing okay.  Didn't want to go home yesterday due to no gas or BM. Petersen catheter in place with clear tea colored urine.   Incisions C/D/I with surgical glue (except JOSE site leaking).  No gas or BM.  Denies N/V/F/C/SOB/CP.   JOSE drain with bloody serosanguineous fluid and 40 mL of output.  Slept well. Ambulated some.              Review of Systems:     The 5 point Review of Systems is negative other than noted in the HPI             Medications:     Current Facility-Administered Medications Ordered in Epic   Medication Dose Route Frequency Last Rate Last Admin    cetirizine (zyrTEC) tablet 10 mg  10 mg Oral Daily   10 mg at 12/06/23 0832    diphenhydrAMINE (BENADRYL) capsule 25 mg  25 mg Oral Q6H PRN   25 mg at 12/05/23 1250    Or    diphenhydrAMINE (BENADRYL) injection 25 mg  25 mg Intravenous Q6H PRN        docusate sodium (COLACE) capsule 100 mg  100 mg Oral BID   100 mg at 12/06/23 0832    heparin ANTICOAGULANT injection 5,000 Units  5,000 Units Subcutaneous Q12H   5,000 Units at 12/05/23 0805    lidocaine (LMX4) cream   Topical Q1H PRN        lidocaine 1 % 0.1-1 mL  0.1-1 mL Other Q1H PRN        magnesium hydroxide (MILK OF MAGNESIA) suspension 30 mL  30 mL Oral Daily PRN   30 mL at 12/06/23 0832    metFORMIN (GLUCOPHAGE XR) 24 hr tablet 500 mg  500 mg  Oral Daily with supper   500 mg at 12/05/23 1725    naloxone (NARCAN) injection 0.2 mg  0.2 mg Intravenous Q2 Min PRN        Or    naloxone (NARCAN) injection 0.4 mg  0.4 mg Intravenous Q2 Min PRN        Or    naloxone (NARCAN) injection 0.2 mg  0.2 mg Intramuscular Q2 Min PRN        Or    naloxone (NARCAN) injection 0.4 mg  0.4 mg Intramuscular Q2 Min PRN        ondansetron (ZOFRAN ODT) ODT tab 4 mg  4 mg Oral Q6H PRN        Or    ondansetron (ZOFRAN) injection 4 mg  4 mg Intravenous Q6H PRN        oxyCODONE (ROXICODONE) tablet 5 mg  5 mg Oral Q4H PRN   5 mg at 12/05/23 1848    Or    oxyCODONE (ROXICODONE) tablet 10 mg  10 mg Oral Q4H PRN        pantoprazole (PROTONIX) EC tablet 40 mg  40 mg Oral QAM AC   40 mg at 12/06/23 0832    polyethylene glycol (MIRALAX) Packet 17 g  17 g Oral Daily PRN   17 g at 12/06/23 0528    pravastatin (PRAVACHOL) tablet 40 mg  40 mg Oral QPM   40 mg at 12/05/23 2038    prochlorperazine (COMPAZINE) injection 5 mg  5 mg Intravenous Q6H PRN        Or    prochlorperazine (COMPAZINE) tablet 5 mg  5 mg Oral Q6H PRN        sodium chloride (PF) 0.9% PF flush 3 mL  3 mL Intracatheter Q8H   3 mL at 12/06/23 0836    sodium chloride (PF) 0.9% PF flush 3 mL  3 mL Intracatheter q1 min prn         Current Outpatient Medications Ordered in Epic   Medication    acetaminophen (TYLENOL) 325 MG tablet    hydrOXYzine HCl (ATARAX) 10 MG tablet    ondansetron (ZOFRAN ODT) 4 MG ODT tab    oxyCODONE (ROXICODONE) 5 MG tablet    senna-docusate (SENOKOT-S/PERICOLACE) 8.6-50 MG tablet                  Physical Exam:   Vitals were reviewed  Patient Vitals for the past 8 hrs:   BP Temp Temp src Pulse Resp SpO2   12/06/23 0819 (!) 136/91 97.7  F (36.5  C) Oral 109 20 95 %     GEN: NAD, lying in bed  EYES: EOMI  MOUTH: MMM  NECK: Supple  RESP: Unlabored breathing  SKIN: Warm  ABD: soft, nontender, undistended, incisions C/D/I.  JOSE drain with bloody serosanguineous fluid and leaking around site.  NEURO: AAO  URO: Petersen  "catheter in place draining tea colored urine. Scrotum-nontender, hotness, or concerning erythema.            Data:   No results found for: \"NTBNPI\", \"NTBNP\"  Lab Results   Component Value Date    WBC 12.1 (H) 12/05/2023    WBC 6.9 11/28/2023    WBC 12.8 (H) 09/14/2007    HGB 13.5 12/05/2023    HGB 15.1 11/28/2023    HGB 16.4 09/14/2007    HCT 41.4 12/05/2023    HCT 47.4 11/28/2023    HCT 48.3 09/14/2007    MCV 91 12/05/2023    MCV 90 11/28/2023    MCV 89 09/14/2007     12/05/2023     11/28/2023     09/14/2007       "

## 2023-12-06 NOTE — PLAN OF CARE
"To Do:  End of Shift Summary  For vital signs and complete assessments, please see documentation flowsheets.     Pertinent assessments: Assumed cares [id number]. A/OX4. Vss and on RA. Afebrile. BS hypoactive but no stool or flatus. Ab incision CDI with liquid bandage. JOSE to bulb suction with minimal output. No c\o pain, slept well on shift. Pt states feeling \"backed up\" and requested meds. CC paged. New orders for both, PRN. Miralax given at 0530.    Major Shift Events :None    Treatment Plan: Pain management, pending discharge. Return of bowel function.     Bedside Nurse: Sabina Mc RN     "

## 2023-12-06 NOTE — PLAN OF CARE
BP (!) 146/76 (BP Location: Left arm, Cuff Size: Adult Regular)   Pulse 90   Temp 98.3  F (36.8  C) (Oral)   Resp 20   Wt 81.9 kg (180 lb 8 oz)   SpO2 92%   BMI 27.05 kg/m      Pt is A&Ox4. VSS on RA. Tolerating regular diet. Ax1 w walker/GB, pt up and walking in halls. 4 lap sites on abd, closed with skin glue. C/D/I. Left JOSE drain with minimal drainage.  Petersen in place and draining adequately. Plan: Pain management/ Petersen. Urology following.

## 2023-12-06 NOTE — PLAN OF CARE
Goal Outcome Evaluation:         End of Shift Summary  For vital signs and complete assessments, please see documentation flowsheets.     Pertinent assessments: Pt A&OX4. Vss and on RA. Ls clear and no sob noted. Tolerating regular diet. Bowel sound active but no BM or passing flatus.  ABD incision intact approximated with skin glue. Left JOSE with minimal serous output. Had some pain and oxy given with relief. Also had some Itching and benadryl given.    Major Shift Events :None    Treatment Plan: Pain management, ponce, urology following, pending discharge.  Bedside Nurse: Jana Lugo RN

## 2023-12-07 ENCOUNTER — NURSE TRIAGE (OUTPATIENT)
Dept: NURSING | Facility: CLINIC | Age: 78
End: 2023-12-07
Payer: MEDICARE

## 2023-12-07 NOTE — TELEPHONE ENCOUNTER
"Nurse Triage SBAR    Is this a 2nd Level Triage? YES, LICENSED PRACTITIONER REVIEW IS REQUIRED    Situation: patient \"went nuts and ate a lot on Tuesday after surgery, vomited several times and now feels better.    Background:   PROSTATECTOMY, ROBOT-ASSISTED, USING DA AIDA XI, WITH PELVIC LYMPHADENECTOMY, LYSIS OF ADHESIONS Bilateral General   Davinci Lysis of Adhesions     Kamlesh Henry MD   on  12-      Assessment:   Discharged to home yesterday.  Patient states that he \"went nuts on Tuesday after surgery\"  He ate a lot of food.  Then felt bloated for the next few days.  Forced down some jello which resulted in vomiting.  Vomited yesterday several times and now feels better  No N/V today.  Abdomen is not distended,, \"no where near what it was before\"  Patient denies fever or chill, although just prior to vomiting, pt had some cold sweats, which are gone after vomiting.  Patient states that he is not passed gas today or yesterday.  He states that in the hospital they told him they heard bowel sounds with the stethoscope.  He did take some Oxycodone and his stool softner last evening.  Pain, \"no real pain\"  Patient has his big collection bag on currently, as he is not out of bed yet.  \"I enjoy doing nothing\"    Urine is described as dark yellow  Patient has not really looked at his incisions, states that he has 5.  Although his JOSE site is draining some slight pink drainage      Protocol Recommended Disposiion:   Home Care  Call if gets worse or any concerns    Recommendation:   Increase your fluid intake to make your urine light yellow  Change your dressing to the draining site, keep it clean and dry.  You feel better today.  If for any reason anything changes, please call us back.  We will do this Home care items.  If vomiting again, call us.  Routing to provider as a FYI unless they feel other action needed.      Routed to provider        Reason for Disposition   Vomiting lasting less than 4 " "hours    Additional Information   Negative: Sounds like a life-threatening emergency to the triager   Negative: Bright red, wide-spread, sunburn-like rash   Negative: SEVERE headache and after spinal (epidural) anesthesia   Negative: Vomiting and persists > 4 hours   Negative: Vomiting and abdomen looks much more swollen than usual   Negative: Drinking very little and dehydration suspected (e.g., no urine > 12 hours, very dry mouth, very lightheaded)   Negative: Patient sounds very sick or weak to the triager   Negative: Sounds like a serious complication to the triager   Negative: Fever > 100.4 F (38.0 C)   Negative: Caller has URGENT question and triager unable to answer question   Negative: SEVERE post-op pain (e.g., excruciating, pain scale 8-10) that is not controlled with pain medications   Negative: Headache and after spinal (epidural) anesthesia and not severe   Negative: Fever present > 3 days (72 hours)   Negative: Patient wants to be seen   Negative: MILD TO MODERATE post-op pain (e.g., pain scale 1-7) that is not controlled with pain medications   Negative: Caller has NON-URGENT question and triager unable to answer question    Answer Assessment - Initial Assessment Questions  1. SYMPTOM: \"What's the main symptom you're concerned about?\" (e.g., pain, fever, vomiting)      Patient with episode of vomiting.  Tuesday pt states \"I went nuts and ate a lot of food, each of the 3 meals I had.\"  \"I felt bloated all day\"  Vomited and felt better.  2. ONSET: \"When did   start?\"      Tuesday after surgery  3. SURGERY: \"What surgery did you have?\"        4. DATE of SURGERY: \"When was the surgery?\"       12-  5. ANESTHESIA: \" What type of anesthesia did you have?\" (e.g., general, spinal, epidural, local)        6. PAIN: \"Is there any pain?\" If Yes, ask: \"How bad is it?\"  (Scale 1-10; or mild, moderate, severe)      Not really any pain  7. FEVER: \"Do you have a fever?\" If Yes, ask: \"What is your temperature, " "how was it measured, and when did it start?\"      no  8. VOMITING: \"Is there any vomiting?\" If Yes, ask: \"How many times?\"      Several times, then felt better  9. BLEEDING: \"Is there any bleeding?\" If Yes, ask: \"How much?\" and \"Where?\"      Drainage from JOSE site,  Asked patient to keep this dressing clean and dry, changed as needed  10. OTHER SYMPTOMS: \"Do you have any other symptoms?\" (e.g., drainage from wound, painful urination, constipation)        Petersen , big bag on currently for overnight, with dark yellow urine.  Pt still in bed this am, \"I enjoy doing nothing\"  I did drink some Gatorade this am , about 10-12 oz.  Denies fever or chills  Abdomen is not distended.  5 abdominal incisions look good,  JOSE site is draining, light pink drainage.  Denies N/V today  Patient denies passing gas, he states that they heard rumblings in the hospital.  Patient taking stool softner,  No BM yet.    Protocols used: Post-Op Symptoms and Ohtjdvrfl-C-CT    "

## 2023-12-08 ENCOUNTER — APPOINTMENT (OUTPATIENT)
Dept: MRI IMAGING | Facility: HOSPITAL | Age: 78
DRG: 394 | End: 2023-12-08
Attending: STUDENT IN AN ORGANIZED HEALTH CARE EDUCATION/TRAINING PROGRAM
Payer: MEDICARE

## 2023-12-08 ENCOUNTER — TELEPHONE (OUTPATIENT)
Dept: UROLOGY | Facility: CLINIC | Age: 78
End: 2023-12-08

## 2023-12-08 ENCOUNTER — NURSE TRIAGE (OUTPATIENT)
Dept: NURSING | Facility: CLINIC | Age: 78
End: 2023-12-08

## 2023-12-08 ENCOUNTER — HOSPITAL ENCOUNTER (INPATIENT)
Facility: HOSPITAL | Age: 78
LOS: 1 days | Discharge: HOME | DRG: 394 | End: 2023-12-10
Attending: EMERGENCY MEDICINE | Admitting: STUDENT IN AN ORGANIZED HEALTH CARE EDUCATION/TRAINING PROGRAM
Payer: MEDICARE

## 2023-12-08 ENCOUNTER — APPOINTMENT (OUTPATIENT)
Dept: CT IMAGING | Facility: HOSPITAL | Age: 78
DRG: 394 | End: 2023-12-08
Attending: EMERGENCY MEDICINE
Payer: MEDICARE

## 2023-12-08 ENCOUNTER — APPOINTMENT (OUTPATIENT)
Dept: RADIOLOGY | Facility: HOSPITAL | Age: 78
DRG: 394 | End: 2023-12-08
Attending: SURGERY
Payer: MEDICARE

## 2023-12-08 DIAGNOSIS — K56.7 POSTOPERATIVE ILEUS (H): ICD-10-CM

## 2023-12-08 DIAGNOSIS — K91.89 POSTOPERATIVE ILEUS (H): ICD-10-CM

## 2023-12-08 LAB
ALBUMIN SERPL BCG-MCNC: 3.6 G/DL (ref 3.5–5.2)
ALBUMIN UR-MCNC: 30 MG/DL
ALP SERPL-CCNC: 61 U/L (ref 40–150)
ALT SERPL W P-5'-P-CCNC: 33 U/L (ref 0–70)
ANION GAP SERPL CALCULATED.3IONS-SCNC: 12 MMOL/L (ref 7–15)
APPEARANCE UR: CLEAR
AST SERPL W P-5'-P-CCNC: 22 U/L (ref 0–45)
BILIRUB SERPL-MCNC: 0.9 MG/DL
BILIRUB UR QL STRIP: NEGATIVE
BUN SERPL-MCNC: 23.7 MG/DL (ref 8–23)
CALCIUM SERPL-MCNC: 9.8 MG/DL (ref 8.8–10.2)
CHLORIDE SERPL-SCNC: 96 MMOL/L (ref 98–107)
COLOR UR AUTO: YELLOW
CREAT SERPL-MCNC: 0.94 MG/DL (ref 0.67–1.17)
DEPRECATED HCO3 PLAS-SCNC: 25 MMOL/L (ref 22–29)
EGFRCR SERPLBLD CKD-EPI 2021: 84 ML/MIN/1.73M2
ERYTHROCYTE [DISTWIDTH] IN BLOOD BY AUTOMATED COUNT: 13.3 % (ref 10–15)
GLUCOSE BLDC GLUCOMTR-MCNC: 162 MG/DL (ref 70–99)
GLUCOSE BLDC GLUCOMTR-MCNC: 166 MG/DL (ref 70–99)
GLUCOSE SERPL-MCNC: 211 MG/DL (ref 70–99)
GLUCOSE UR STRIP-MCNC: 70 MG/DL
HCT VFR BLD AUTO: 43 % (ref 40–53)
HGB BLD-MCNC: 14.4 G/DL (ref 13.3–17.7)
HGB UR QL STRIP: ABNORMAL
HYALINE CASTS: 10 /LPF
KETONES UR STRIP-MCNC: 60 MG/DL
LEUKOCYTE ESTERASE UR QL STRIP: ABNORMAL
MAGNESIUM SERPL-MCNC: 1.8 MG/DL (ref 1.7–2.3)
MCH RBC QN AUTO: 29.7 PG (ref 26.5–33)
MCHC RBC AUTO-ENTMCNC: 33.5 G/DL (ref 31.5–36.5)
MCV RBC AUTO: 89 FL (ref 78–100)
MUCOUS THREADS #/AREA URNS LPF: PRESENT /LPF
NITRATE UR QL: NEGATIVE
PH UR STRIP: 5.5 [PH] (ref 5–7)
PHOSPHATE SERPL-MCNC: 2.9 MG/DL (ref 2.5–4.5)
PLATELET # BLD AUTO: 317 10E3/UL (ref 150–450)
POTASSIUM SERPL-SCNC: 3.8 MMOL/L (ref 3.4–5.3)
PROT SERPL-MCNC: 6.5 G/DL (ref 6.4–8.3)
RBC # BLD AUTO: 4.85 10E6/UL (ref 4.4–5.9)
RBC URINE: 112 /HPF
SODIUM SERPL-SCNC: 133 MMOL/L (ref 135–145)
SP GR UR STRIP: 1.03 (ref 1–1.03)
UROBILINOGEN UR STRIP-MCNC: <2 MG/DL
WBC # BLD AUTO: 8.8 10E3/UL (ref 4–11)
WBC URINE: 13 /HPF

## 2023-12-08 PROCEDURE — 250N000011 HC RX IP 250 OP 636: Mod: JZ | Performed by: EMERGENCY MEDICINE

## 2023-12-08 PROCEDURE — 74176 CT ABD & PELVIS W/O CONTRAST: CPT | Mod: MG

## 2023-12-08 PROCEDURE — 36415 COLL VENOUS BLD VENIPUNCTURE: CPT | Performed by: EMERGENCY MEDICINE

## 2023-12-08 PROCEDURE — 82962 GLUCOSE BLOOD TEST: CPT

## 2023-12-08 PROCEDURE — 250N000013 HC RX MED GY IP 250 OP 250 PS 637: Mod: GY | Performed by: SURGERY

## 2023-12-08 PROCEDURE — 96361 HYDRATE IV INFUSION ADD-ON: CPT

## 2023-12-08 PROCEDURE — 258N000003 HC RX IP 258 OP 636: Performed by: EMERGENCY MEDICINE

## 2023-12-08 PROCEDURE — 81001 URINALYSIS AUTO W/SCOPE: CPT | Performed by: STUDENT IN AN ORGANIZED HEALTH CARE EDUCATION/TRAINING PROGRAM

## 2023-12-08 PROCEDURE — 99223 1ST HOSP IP/OBS HIGH 75: CPT | Mod: A1 | Performed by: STUDENT IN AN ORGANIZED HEALTH CARE EDUCATION/TRAINING PROGRAM

## 2023-12-08 PROCEDURE — 96374 THER/PROPH/DIAG INJ IV PUSH: CPT

## 2023-12-08 PROCEDURE — 250N000011 HC RX IP 250 OP 636: Performed by: STUDENT IN AN ORGANIZED HEALTH CARE EDUCATION/TRAINING PROGRAM

## 2023-12-08 PROCEDURE — A9585 GADOBUTROL INJECTION: HCPCS | Performed by: STUDENT IN AN ORGANIZED HEALTH CARE EDUCATION/TRAINING PROGRAM

## 2023-12-08 PROCEDURE — 87086 URINE CULTURE/COLONY COUNT: CPT | Performed by: STUDENT IN AN ORGANIZED HEALTH CARE EDUCATION/TRAINING PROGRAM

## 2023-12-08 PROCEDURE — 80053 COMPREHEN METABOLIC PANEL: CPT | Performed by: EMERGENCY MEDICINE

## 2023-12-08 PROCEDURE — 84100 ASSAY OF PHOSPHORUS: CPT | Performed by: STUDENT IN AN ORGANIZED HEALTH CARE EDUCATION/TRAINING PROGRAM

## 2023-12-08 PROCEDURE — 99285 EMERGENCY DEPT VISIT HI MDM: CPT | Mod: 25

## 2023-12-08 PROCEDURE — 255N000002 HC RX 255 OP 636: Performed by: STUDENT IN AN ORGANIZED HEALTH CARE EDUCATION/TRAINING PROGRAM

## 2023-12-08 PROCEDURE — 83735 ASSAY OF MAGNESIUM: CPT | Performed by: STUDENT IN AN ORGANIZED HEALTH CARE EDUCATION/TRAINING PROGRAM

## 2023-12-08 PROCEDURE — 96372 THER/PROPH/DIAG INJ SC/IM: CPT | Mod: XU | Performed by: STUDENT IN AN ORGANIZED HEALTH CARE EDUCATION/TRAINING PROGRAM

## 2023-12-08 PROCEDURE — 74183 MRI ABD W/O CNTR FLWD CNTR: CPT | Mod: MG

## 2023-12-08 PROCEDURE — 96375 TX/PRO/DX INJ NEW DRUG ADDON: CPT

## 2023-12-08 PROCEDURE — 99204 OFFICE O/P NEW MOD 45 MIN: CPT | Mod: FS | Performed by: SURGERY

## 2023-12-08 PROCEDURE — 250N000012 HC RX MED GY IP 250 OP 636 PS 637: Performed by: STUDENT IN AN ORGANIZED HEALTH CARE EDUCATION/TRAINING PROGRAM

## 2023-12-08 PROCEDURE — G0378 HOSPITAL OBSERVATION PER HR: HCPCS

## 2023-12-08 PROCEDURE — 258N000003 HC RX IP 258 OP 636: Performed by: STUDENT IN AN ORGANIZED HEALTH CARE EDUCATION/TRAINING PROGRAM

## 2023-12-08 PROCEDURE — 85027 COMPLETE CBC AUTOMATED: CPT | Performed by: EMERGENCY MEDICINE

## 2023-12-08 PROCEDURE — 99207 PR NO BILLABLE SERVICE THIS VISIT: CPT | Performed by: PHYSICIAN ASSISTANT

## 2023-12-08 PROCEDURE — 74018 RADEX ABDOMEN 1 VIEW: CPT

## 2023-12-08 RX ORDER — SODIUM CHLORIDE 9 MG/ML
INJECTION, SOLUTION INTRAVENOUS CONTINUOUS
Status: DISCONTINUED | OUTPATIENT
Start: 2023-12-08 | End: 2023-12-09

## 2023-12-08 RX ORDER — MAGNESIUM SULFATE HEPTAHYDRATE 40 MG/ML
2 INJECTION, SOLUTION INTRAVENOUS ONCE
Status: COMPLETED | OUTPATIENT
Start: 2023-12-08 | End: 2023-12-08

## 2023-12-08 RX ORDER — NICOTINE POLACRILEX 4 MG
15-30 LOZENGE BUCCAL
Status: DISCONTINUED | OUTPATIENT
Start: 2023-12-08 | End: 2023-12-10 | Stop reason: HOSPADM

## 2023-12-08 RX ORDER — PRAVASTATIN SODIUM 20 MG
40 TABLET ORAL DAILY
Status: DISCONTINUED | OUTPATIENT
Start: 2023-12-08 | End: 2023-12-10 | Stop reason: HOSPADM

## 2023-12-08 RX ORDER — CALCIUM CARBONATE 500 MG/1
1000 TABLET, CHEWABLE ORAL 4 TIMES DAILY PRN
Status: DISCONTINUED | OUTPATIENT
Start: 2023-12-08 | End: 2023-12-10 | Stop reason: HOSPADM

## 2023-12-08 RX ORDER — LIDOCAINE 40 MG/G
CREAM TOPICAL
Status: DISCONTINUED | OUTPATIENT
Start: 2023-12-08 | End: 2023-12-10 | Stop reason: HOSPADM

## 2023-12-08 RX ORDER — BISACODYL 10 MG
10 SUPPOSITORY, RECTAL RECTAL 2 TIMES DAILY
Status: DISCONTINUED | OUTPATIENT
Start: 2023-12-08 | End: 2023-12-10 | Stop reason: HOSPADM

## 2023-12-08 RX ORDER — DEXTROSE MONOHYDRATE 25 G/50ML
25-50 INJECTION, SOLUTION INTRAVENOUS
Status: DISCONTINUED | OUTPATIENT
Start: 2023-12-08 | End: 2023-12-10 | Stop reason: HOSPADM

## 2023-12-08 RX ORDER — ONDANSETRON 2 MG/ML
4 INJECTION INTRAMUSCULAR; INTRAVENOUS ONCE
Status: COMPLETED | OUTPATIENT
Start: 2023-12-08 | End: 2023-12-08

## 2023-12-08 RX ORDER — HEPARIN SODIUM 5000 [USP'U]/.5ML
5000 INJECTION, SOLUTION INTRAVENOUS; SUBCUTANEOUS EVERY 8 HOURS
Status: DISCONTINUED | OUTPATIENT
Start: 2023-12-08 | End: 2023-12-10 | Stop reason: HOSPADM

## 2023-12-08 RX ORDER — GADOBUTROL 604.72 MG/ML
8 INJECTION INTRAVENOUS ONCE
Status: COMPLETED | OUTPATIENT
Start: 2023-12-08 | End: 2023-12-08

## 2023-12-08 RX ORDER — ONDANSETRON 4 MG/1
4 TABLET, ORALLY DISINTEGRATING ORAL EVERY 6 HOURS PRN
Status: DISCONTINUED | OUTPATIENT
Start: 2023-12-08 | End: 2023-12-10 | Stop reason: HOSPADM

## 2023-12-08 RX ORDER — SODIUM CHLORIDE 9 MG/ML
INJECTION, SOLUTION INTRAVENOUS CONTINUOUS
Status: DISCONTINUED | OUTPATIENT
Start: 2023-12-08 | End: 2023-12-08

## 2023-12-08 RX ORDER — AMOXICILLIN 250 MG
1 CAPSULE ORAL 2 TIMES DAILY PRN
Status: DISCONTINUED | OUTPATIENT
Start: 2023-12-08 | End: 2023-12-10 | Stop reason: HOSPADM

## 2023-12-08 RX ORDER — ASPIRIN 81 MG/1
81 TABLET ORAL DAILY
Status: DISCONTINUED | OUTPATIENT
Start: 2023-12-08 | End: 2023-12-10 | Stop reason: HOSPADM

## 2023-12-08 RX ORDER — AMOXICILLIN 250 MG
2 CAPSULE ORAL 2 TIMES DAILY PRN
Status: DISCONTINUED | OUTPATIENT
Start: 2023-12-08 | End: 2023-12-10 | Stop reason: HOSPADM

## 2023-12-08 RX ORDER — ONDANSETRON 2 MG/ML
4 INJECTION INTRAMUSCULAR; INTRAVENOUS EVERY 6 HOURS PRN
Status: DISCONTINUED | OUTPATIENT
Start: 2023-12-08 | End: 2023-12-10 | Stop reason: HOSPADM

## 2023-12-08 RX ADMIN — HEPARIN SODIUM 5000 UNITS: 5000 INJECTION, SOLUTION INTRAVENOUS; SUBCUTANEOUS at 16:21

## 2023-12-08 RX ADMIN — FAMOTIDINE 20 MG: 10 INJECTION, SOLUTION INTRAVENOUS at 09:58

## 2023-12-08 RX ADMIN — BISACODYL 10 MG: 10 SUPPOSITORY RECTAL at 18:02

## 2023-12-08 RX ADMIN — SODIUM CHLORIDE: 9 INJECTION, SOLUTION INTRAVENOUS at 09:56

## 2023-12-08 RX ADMIN — MAGNESIUM SULFATE HEPTAHYDRATE 2 G: 40 INJECTION, SOLUTION INTRAVENOUS at 16:38

## 2023-12-08 RX ADMIN — GADOBUTROL 8 ML: 604.72 INJECTION INTRAVENOUS at 22:32

## 2023-12-08 RX ADMIN — INSULIN ASPART 1 UNITS: 100 INJECTION, SOLUTION INTRAVENOUS; SUBCUTANEOUS at 16:20

## 2023-12-08 RX ADMIN — ONDANSETRON 4 MG: 2 INJECTION INTRAMUSCULAR; INTRAVENOUS at 09:58

## 2023-12-08 RX ADMIN — SODIUM CHLORIDE: 9 INJECTION, SOLUTION INTRAVENOUS at 14:35

## 2023-12-08 ASSESSMENT — ACTIVITIES OF DAILY LIVING (ADL)
ADLS_ACUITY_SCORE: 35
ADLS_ACUITY_SCORE: 22
ADLS_ACUITY_SCORE: 33
ADLS_ACUITY_SCORE: 22
ADLS_ACUITY_SCORE: 22
ADLS_ACUITY_SCORE: 35
DEPENDENT_IADLS:: INDEPENDENT
ADLS_ACUITY_SCORE: 35
ADLS_ACUITY_SCORE: 35

## 2023-12-08 NOTE — TELEPHONE ENCOUNTER
Nurse Triage SBAR    Is this a 2nd Level Triage?  No    Situation   Constipation/vomiting/Nausea    Background/Assessment:     Pt had a prostatectomy a few days ago.    Pt reporting since he has had the surgery.  He has had not bowel movement.  Very nauseated.   Has thrown up several times.     Feels very urpi.  Feels bloated and not passing very much gas.          Pt wondering if he is blocked up and worried about a bowel obstruction.    I suggested ER for an evaluation for Pt care.  Pt agreed and will go to [facility] for an evaluation for Pt care.     No further action needed.    Gwendolyn Schreiber RN  Central Triage Red Flags/Med Refills        Protocol Recommended Disposition:   Go To ED/UCC Now (Or To Office With PCP Approval)        Reason for Disposition   Patient sounds very sick or weak to the triager    Additional Information   Negative: Abdomen pain is main symptom and male   Negative: Abdomen pain is main symptom and female   Negative: Rectal bleeding or blood in stool is main symptom   Negative: Vomiting bile (green color)    Protocols used: Constipation-A-OH

## 2023-12-08 NOTE — ED PROVIDER NOTES
EMERGENCY DEPARTMENT ENCOUNTER      NAME: Agustin Nolan  AGE: 78 year old male  YOB: 1945  MRN: [id number]  EVALUATION DATE & TIME: 12/8/2023  9:00 AM    PCP: Vicente Tomlin    ED PROVIDER: Thad Burgos M.D.      Chief Complaint   Patient presents with    Vomiting    Abdominal Pain         FINAL IMPRESSION:  Postoperative ileus      ED COURSE & MEDICAL DECISION MAKING:    Pertinent Labs & Imaging studies reviewed. (See chart for details)  78 year old male presents to the Emergency Department for evaluation of abdominal pain, nausea vomiting.  Patient underwent robot-assisted prostatectomy on Monday.  Did have preop prep suggesting bowel cleansing.  Since leaving the hospital on December 6 patient with vomiting with any efforts to eat or drink.  Minimal passage of gas.  No stooling.  Exam today reveals elderly male in mild to moderate distress.  Vital signs significant for tachycardia with a rate of 125.  Lungs clear.  Abdomen with multiple port sites which are dry and well-approximated except for the left lateral port site especially 1 cm in length.  Slightly dehisced with minimal serous discharge.  Mild diffuse tenderness.  Essentially absent bowel sounds.  Patient with potential postoperative ileus versus obstructive process.  Given lack of intake and repeated vomiting secondary tachycardia developed.  Will obtain baseline blood work to assess for evidence of infectious process, postoperative bleeding, dehydration.  Intravenous fluids initiated at 500 cc/h of normal saline.  Patient treated symptomatically with intravenous Pepcid and Zofran.  CT imaging also been obtained to assess for postoperative complications..       9:01 AM  I met with the patient for the initial interview and physical examination. Discussed plan for treatment and workup in the ED.    10:53 AM.  Patient with minimal hyponatremia and mild hyperglycemia.  CBC unremarkable.  Awaiting CT  11:35 AM.  CT images reviewed.  Patient  with proximal gas-filled loops of small bowel with distal small bowel fluid-filled.  Ileus versus mechanical obstruction.  Awaiting definitive report.  Bladder drained with Petersen catheter in place.  No evidence of free fluid   12:45 PM.  CT imaging with postoperative ileus.  No obvious mechanical obstruction.  Free air consistent with recent surgery.  Given his age, inability to maintain fluid status and ileus will proceed with hospitalization.  1:24 PM Spoke with Dr. Gondal hospitalist who accepts the patient for admission.  Requests surgical consultation.  Call placed.    at the conclusion of the encounter I discussed the results of all of the tests and the disposition. The questions were answered and return precautions provided. The patient or family acknowledged understanding and was agreeable with the care plan.       PPE: Provider wore gloves, N95 mask, eye protection, surgical cap, and paper mask.   Medical Decision Making    History:  Supplemental history from: Documented in chart, if applicable  External Record(s) reviewed: Documented in chart, if applicable.    Work Up:  Chart documentation includes differential considered and any EKGs or imaging independently interpreted by provider, where specified.  In additional to work up documented, I considered the following work up: Documented in chart, if applicable.    External consultation:  Discussion of management with another provider: Documented in chart, if applicable    Complicating factors:  Care impacted by chronic illness: Cancer/Chemotherapy, Diabetes, and Hyperlipidemia  Care affected by social determinants of health: N/A    Disposition considerations: Admit.       MEDICATIONS GIVEN IN THE EMERGENCY:  Medications - No data to display    NEW PRESCRIPTIONS STARTED AT TODAY'S ER VISIT  New Prescriptions    No medications on file          =================================================================    HPI    Patient information was obtained from:  patient     Use of Intrepreter: N/A       Agustin Nolan is a 78 year old male with a pertient medical history of DMII, HLD, prostate cancer s/p prostatectomy (12/04/23) who presents to the ED for evaluation of abdominal pain.     Per chart review: patient underwent a prostatectomy on 12/04/23 at [facility]. He was discharged home on 12/06/23 in good condition. Patient had ponce cares done and JOSE removed and he was educated on how to take care of site.    On 12/04/23 (~4 day ago), the patient underwent a prostatectomy. Since then, the patient reports not being able to have a bowel movement. He is passing minimal gas. The patient endorses associated abdominal pain and distension. The patient also reports a decreased appetite and will vomit right after eating. Normal urine production. He otherwise denies any other complaints at this time.     REVIEW OF SYSTEMS   Constitutional:  Denies fever, chills. Positive for decreased appetite   Respiratory:  Denies productive cough or increased work of breathing  Cardiovascular:  Denies chest pain, palpitations  GI:  Positive for vomiting, abdominal pain and abdominal distension. Positive for constipation.    Musculoskeletal:  Denies any new muscle/joint swelling  Skin:  Denies rash   Neurologic:  Denies focal weakness  All systems negative except as marked.     PAST MEDICAL HISTORY:  Past Medical History:   Diagnosis Date    Esophageal reflux        PAST SURGICAL HISTORY:  Past Surgical History:   Procedure Laterality Date    APPENDECTOMY      cystoscopy with pyeloscopy with removal of calculus.      DAVINCI LYSIS OF ADHESIONS N/A 12/4/2023    Procedure: Davinci Lysis of Adhesions;  Surgeon: Kamlesh Henry MD;  Location: [facility] OR    DAVINCI PROSTATECTOMY, LYMPHADENECTOMY Bilateral 12/4/2023    Procedure: PROSTATECTOMY, ROBOT-ASSISTED, USING DA AIDA XI, WITH PELVIC LYMPHADENECTOMY, LYSIS OF ADHESIONS;  Surgeon: Kamlesh Henry MD;  Location: [facility] OR    PROSTATE  SURGERY      ROTATOR CUFF REPAIR RT/LT      surgery testis exploration of undescended.  1985    TESTICLE SURGERY      VASECTOMY           CURRENT MEDICATIONS:    No current facility-administered medications for this encounter.    Current Outpatient Medications:     ACCU-CHEK GUIDE test strip, 1 strip by In Vitro route daily To test blood sugar, Disp: 100 strip, Rfl: 1    acetaminophen (TYLENOL) 325 MG tablet, Take 2 tablets (650 mg) by mouth every 4 hours as needed for other (mild pain), Disp: 100 tablet, Rfl: 0    aspirin (ASA) 81 MG EC tablet, Take 81 mg by mouth daily, Disp: 300 tablet, Rfl: 1    blood glucose monitoring (ACCU-CHEK JHON PLUS) meter device kit, Use to test blood sugar 1 times daily or as directed., Disp: 1 kit, Rfl: 0    cetirizine (ZYRTEC) 10 MG tablet, Take 10 mg by mouth daily, Disp: , Rfl:     Cholecalciferol (VITAMIN D) 2000 UNITS tablet, Take 1 tablet by mouth daily, Disp: , Rfl:     hydrOXYzine HCl (ATARAX) 10 MG tablet, Take 1 tablet (10 mg) by mouth every 6 hours as needed for itching or anxiety (with pain, moderate pain), Disp: 30 tablet, Rfl: 0    metFORMIN (GLUCOPHAGE XR) 500 MG 24 hr tablet, Take one tab with breakfast and one tablet with dinner., Disp: 180 tablet, Rfl: 1    omeprazole (PRILOSEC) 20 MG DR capsule, Take 1 capsule (20 mg) by mouth daily, Disp: 90 capsule, Rfl: 1    ondansetron (ZOFRAN ODT) 4 MG ODT tab, Take 1-2 tablets (4-8 mg) by mouth every 8 hours as needed for nausea Dissolve ON the tongue., Disp: 10 tablet, Rfl: 0    oxyCODONE (ROXICODONE) 5 MG tablet, Take 1 tablet (5 mg) by mouth every 6 hours as needed for pain, Disp: 12 tablet, Rfl: 0    pravastatin (PRAVACHOL) 40 MG tablet, Take 1 tablet (40 mg) by mouth daily, Disp: 90 tablet, Rfl: 3    senna-docusate (SENOKOT-S/PERICOLACE) 8.6-50 MG tablet, Take 1 tablet by mouth 2 times daily as needed for constipation (and while on narcotic pain medication.), Disp: 30 tablet, Rfl: 0    ALLERGIES:  Allergies   Allergen  Reactions    Shellfish-Derived Products Rash    Cats      Hard time breathing, running nose       FAMILY HISTORY:  No family history on file.    SOCIAL HISTORY:   Social History     Socioeconomic History    Marital status:    Tobacco Use    Smoking status: Never    Smokeless tobacco: Never   Substance and Sexual Activity    Alcohol use: Yes     Alcohol/week: 1.0 standard drink of alcohol     Types: 1 Standard drinks or equivalent per week     Comment: occasional    Drug use: No    Sexual activity: Never     Social Determinants of Health     Financial Resource Strain: Low Risk  (11/21/2023)    Financial Resource Strain     Within the past 12 months, have you or your family members you live with been unable to get utilities (heat, electricity) when it was really needed?: No   Food Insecurity: Low Risk  (11/21/2023)    Food Insecurity     Within the past 12 months, did you worry that your food would run out before you got money to buy more?: No     Within the past 12 months, did the food you bought just not last and you didn t have money to get more?: No   Transportation Needs: Low Risk  (11/21/2023)    Transportation Needs     Within the past 12 months, has lack of transportation kept you from medical appointments, getting your medicines, non-medical meetings or appointments, work, or from getting things that you need?: No   Interpersonal Safety: Low Risk  (11/28/2023)    Interpersonal Safety     Do you feel physically and emotionally safe where you currently live?: Yes     Within the past 12 months, have you been hit, slapped, kicked or otherwise physically hurt by someone?: Patient refused     Within the past 12 months, have you been humiliated or emotionally abused in other ways by your partner or ex-partner?: Patient refused   Housing Stability: Low Risk  (11/21/2023)    Housing Stability     Do you have housing? : Yes     Are you worried about losing your housing?: No       VITALS:  Patient Vitals for the  "past 24 hrs:   BP Temp Temp src Pulse Resp SpO2 Height Weight   12/08/23 0853 130/75 97.9  F (36.6  C) Oral (!) 124 18 95 % 1.727 m (5' 8\") 81.6 kg (180 lb)        PHYSICAL EXAM    Constitutional:  Awake, alert, in mild distress  HENT:  Normocephalic, Atraumatic. Bilateral external ears normal. Oropharynx moist. Nose normal. Neck- Normal range of motion with no guarding, No midline cervical tenderness, Supple, No stridor.   Eyes:  PERRL, EOMI with no signs of entrapment, Conjunctiva normal, No discharge.   Respiratory:  Normal breath sounds, No respiratory distress, No wheezing.    Cardiovascular:  Normal heart rate, Normal rhythm, No appreciable rubs or gallops.   GI:  Soft, No palpable masses. Port sites clean/dry. Left lateral port site with serous oozing. Near absent bowel sounds. Diffuse tenderness.   Musculoskeletal:  Intact distal pulses, No edema. Good range of motion in all major joints. No tenderness to palpation or major deformities noted.  Integument:  Warm, Dry, No erythema, No rash.   Neurologic:  Alert & oriented, Normal motor function, Normal sensory function, No focal deficits noted.   Psychiatric:  Affect normal, Judgment normal, Mood normal.     LAB:  All pertinent labs reviewed and interpreted.     Results for orders placed or performed during the hospital encounter of 12/08/23   CT Abdomen Pelvis w/o Contrast     Status: None    Narrative    EXAM: CT ABDOMEN PELVIS W/O CONTRAST  LOCATION: [facility]  DATE: 12/8/2023    INDICATION: Postoperative vomiting  COMPARISON: 10/24/2023.   TECHNIQUE: CT scan of the abdomen and pelvis was performed without IV contrast. Multiplanar reformats were obtained. Dose reduction techniques were used.  CONTRAST: None.    FINDINGS:   LOWER CHEST: Bronchiectasis. Coronary artery disease.     HEPATOBILIARY: Hepatic steatosis. Multiple hypoattenuating hepatic lesions measuring up to 25 mm.     PANCREAS: Normal.    SPLEEN: Normal.    ADRENAL " GLANDS: Normal.    KIDNEYS/BLADDER: Wall thickening of the urinary bladder. The bladder is not decompressed despite presence of a Petersen catheter.     BOWEL: Normal stomach. Multiple loops of small bowel are distended with a maximum diameter of 3.7 cm. Air-fluid levels are present. No transition point. The distal ileum is not distended. No CT evidence of appendicitis. There is colonic diverticulosis   without diverticulitis.     LYMPH NODES: Normal.    VASCULATURE: Mild atherosclerotic disease.     PELVIC ORGANS: Prostatectomy.     OTHER: Small amount of free air in the abdomen. No free fluid.    MUSCULOSKELETAL: Postsurgical changes in the abdominal wall.       Impression    IMPRESSION:   1.  Prostatectomy. Intraperitoneal free air is from recent surgery.   2.  Small bowel paralytic ileus. No mechanical bowel obstruction.  3.  Wall thickening of the urinary bladder could be mild cystitis.  4.  Multiple hypoattenuating hepatic lesions are incompletely assessed on this study. These could be better assessed with liver mass protocol MRI. There is hepatic steatosis.  5.  Bronchiectasis of unclear etiology.     Comprehensive metabolic panel     Status: Abnormal   Result Value Ref Range    Sodium 133 (L) 135 - 145 mmol/L    Potassium 3.8 3.4 - 5.3 mmol/L    Carbon Dioxide (CO2) 25 22 - 29 mmol/L    Anion Gap 12 7 - 15 mmol/L    Urea Nitrogen 23.7 (H) 8.0 - 23.0 mg/dL    Creatinine 0.94 0.67 - 1.17 mg/dL    GFR Estimate 84 >60 mL/min/1.73m2    Calcium 9.8 8.8 - 10.2 mg/dL    Chloride 96 (L) 98 - 107 mmol/L    Glucose 211 (H) 70 - 99 mg/dL    Alkaline Phosphatase 61 40 - 150 U/L    AST 22 0 - 45 U/L    ALT 33 0 - 70 U/L    Protein Total 6.5 6.4 - 8.3 g/dL    Albumin 3.6 3.5 - 5.2 g/dL    Bilirubin Total 0.9 <=1.2 mg/dL   CBC (+ platelets, no diff)     Status: Normal   Result Value Ref Range    WBC Count 8.8 4.0 - 11.0 10e3/uL    RBC Count 4.85 4.40 - 5.90 10e6/uL    Hemoglobin 14.4 13.3 - 17.7 g/dL    Hematocrit 43.0 40.0 -  53.0 %    MCV 89 78 - 100 fL    MCH 29.7 26.5 - 33.0 pg    MCHC 33.5 31.5 - 36.5 g/dL    RDW 13.3 10.0 - 15.0 %    Platelet Count 317 150 - 450 10e3/uL      RADIOLOGY:  Reviewed all pertinent imaging. Please see official radiology report.  No orders to display       I, Che Mayer, am serving as a scribe to document services personally performed by Thad Burgos MD, based on my observation and the provider's statements to me. I, Thad Burgos MD attest that Che Mayer is acting in a scribe capacity, has observed my performance of the services and has documented them in accordance with my direction.    Thad Burgos M.D.  Emergency Medicine  [facility] EMERGENCY DEPARTMENT     Thad Burgos MD  12/08/23 2021

## 2023-12-08 NOTE — ED TRIAGE NOTES
Patient presents here for evaluation of vomiting that began on 12/6 following discharge from this hospital. He had underwent a prostatectomy on 12/4. Last bowel movement occurred about 10 days ago. He notes abdominal distension. He was not passing gas until this morning.

## 2023-12-08 NOTE — ED NOTES
"[facility] ED Handoff Report    ED Chief Complaint: Abd pain, n/v    ED Diagnosis:  (K91.89,  K56.7) Postoperative ileus (H)  Comment: Last BM was about 5 days ago on Sunday; had surgery on Monday   Plan: Surgery consult, NPO, new order for suppository       PMH:    Past Medical History:   Diagnosis Date    Esophageal reflux         Code Status:  Full Code     Falls Risk: No Band: Not applicable    Current Living Situation/Residence: lives alone     Elimination Status: Continent: Yes     Activity Level: Independent    Patients Preferred Language:  English     Needed: No    Vital Signs:  /83   Pulse 86   Temp 97.9  F (36.6  C) (Oral)   Resp 18   Ht 1.727 m (5' 8\")   Wt 81.6 kg (180 lb)   SpO2 93%   BMI 27.37 kg/m       Cardiac Rhythm: NA    Pain Score: 0/10    Is the Patient Confused:  No    Last Food or Drink: 12/07/23 at 2100 applesauce    Focused Assessment:  Pt alert and oriented x 4, independent and lives alone; Pt had recent prostatectomy with ponce catheter to leg bag, pietro urine. Pt has healing surgical incisions closed with skin glue to abd that appear well-approximated and without redness or drainage. Dressing to Left abdomen is clean/dry/intact. Abd distended with rare bowel sounds, passing flatus but no BM since Sunday.     Tests Performed: Done: Labs and Imaging    Treatments Provided:  IV fluids, insulin per protocol, NPO    Family Dynamics/Concerns: No    Family Updated On Visitor Policy: Yes    Plan of Care Communicated to Family: Yes    Who Was Updated about Plan of Care: Pt updating his own family with cell phone    Belongings Checklist Done and Signed by Patient: Yes    Belongings Sent with Patient: Clothing, cell phone, backpack    Medications sent with patient: none    Covid: asymptomatic , not tested    Additional Information: Pt to have a suppository, new order added by surgery, but did not give yet as he is in hallway    RN: Demetria Davies RN   " 12/8/2023 4:23 PM

## 2023-12-08 NOTE — H&P
[facility]    History and Physical - Hospitalist Service       Date of Admission:  12/8/2023    Assessment & Plan    Assessment:  Agustin Nolan is a 78 year old male admitted on 12/8/2023. He presented to the ER with complaint of Abdominal discomfort, nausea, vomiting, lack of bowel movement s/p prostatectomy.  As per patient he recently had prostatectomy on Monday 12/4/2023, he stated that he was discharged after prostatectomy but has not had a bowel movement since the procedure and started having nausea and vomiting and cannot keep anything down, as patient had multiple episodes of vomiting with minimal food intake, he decided to come to the ER, in the ER initially patient was seen to be tachycardic which resolved, labs were significant for mild hyponatremia, hypochloremia, CT scan of the abdomen was performed which showed. Prostatectomy. Intraperitoneal free air is from recent surgery. Small bowel paralytic ileus. No mechanical bowel obstruction. Wall thickening of the urinary bladder could be mild cystitis. Multiple hypoattenuating hepatic lesions are incompletely assessed on this study. There is hepatic steatosis. Bronchiectasis of unclear etiology.  Patient currently does not have any pulmonary symptoms, in the ER patient received Zofran and famotidine and fluids and is going to be admitted for further evaluation and management small bowel ileus and hepatic lesions.    Problem list and plan:  Abdominal discomfort, nausea, vomiting, lack of bowel movement  Small bowel paralytic ileus s/p prostatectomy  Presented with above symptoms of abdominal discomfort, nausea, vomiting and lack of bowel movement since prostatectomy  Patient stated that he started passing gas but otherwise does not have any bowel movement  Bowel sounds hypoactive and belly appears to be mildly distended but not tender  CT scan of the abdomen was performed which showed. Prostatectomy. Intraperitoneal free air is from  recent surgery. Small bowel paralytic ileus. No mechanical bowel obstruction  In the ER patient received Zofran and famotidine and fluids  Continue with antiemetics and pain management as appropriate  Bowel rest, IV fluids, n.p.o. for now, serial abdominal examination  Continue IV hydration, IV Protonix  Surgery consulted as per surgery to continue n.p.o. for bowel rest, NG tube for decompression if patient develops nausea/vomiting, monitor electrolytes and replete, general surgery will continue to follow    Hepatic lesions  Ct scan abdomen showed: Multiple hypoattenuating hepatic lesions are incompletely assessed on this study. There is hepatic steatosis  Follow-up MRI abdomen hepatic mass protocol    Tachycardia most likely in setting of dehydration resolved  Mild hyponatremia along with hypochloremia  Continue gentle IV hydration  Monitor sodium and chloride levels along with heart rate    Bronchiectasis on imaging  CT scan showed bronchiectasis of unclear etiology.  Patient currently does not have any pulmonary symptoms    Cystitis on imaging  No urinary symptoms  CT scan of the abdomen showed Wall thickening of the urinary bladder could be mild cystitis  Ordered UA with reflex microscopy to culture  No need for antibiotics for now, monitor fever and WBC curve    Electrolyte abnormality/hypomagnesemia  Repleted    History of hyperlipidemia  Continue with aspirin and pravastatin once off of n.p.o.    History of type 2 diabetes mellitus  Hemoglobin A1c 11/20/2023 7.5  At home on metformin which will be on hold  Continue with sliding scale, hypoglycemia protocol, blood sugar monitoring  Sugars are currently running in the appropriate range    CODE STATUS  Full code as per discussion with the patient    DVT PPx  Heparin SQ every 8  Intermittent pneumatic compression        Diet: NPO for Medical/Clinical Reasons Except for: No Exceptions    DVT Prophylaxis: Heparin SQ  Petersen Catheter: Not present  Lines: None    "  Cardiac Monitoring: None  Code Status: Full Code    Mental status: Patient was alert awake and oriented x 3, patient was a good historian most of the history was obtained from the patient, the history was obtained from chart review and the rest of the history was obtained from discussion with the ER physician  Clinically Significant Risk Factors Present on Admission                # Drug Induced Platelet Defect: home medication list includes an antiplatelet medication       # DMII: A1C = 7.5 % (Ref range: <5.7 %) within past 6 months    # Overweight: Estimated body mass index is 27.37 kg/m  as calculated from the following:    Height as of this encounter: 1.727 m (5' 8\").    Weight as of this encounter: 81.6 kg (180 lb).              Disposition Plan      Expected Discharge Date: 12/09/2023                  Saad J. Gondal, MD  Hospitalist Service  [facility]  Securely message with [employer] (more info)  Text page via [employer] Paging/Directory     ______________________________________________________________________    Chief Complaint   Abdominal discomfort, nausea, vomiting, lack of bowel movement s/p prostatectomy    History is obtained from the patient    History of Present Illness   Agustin Nolan is a 78 year old male with a past medical history documented below presented to the ER with complaint of Abdominal discomfort, nausea, vomiting, lack of bowel movement s/p prostatectomy.  As per patient he recently had prostatectomy on Monday 12/4/2023, he stated that he was discharged after prostatectomy but has not had a bowel movement since the procedure and started having nausea and vomiting and cannot keep anything down, as patient had multiple episodes of vomiting with minimal food intake, he decided to come to the ER, in the ER initially patient was seen to be tachycardic which resolved, labs were significant for mild hyponatremia, hypochloremia, CT scan of the abdomen was performed which " showed. Prostatectomy. Intraperitoneal free air is from recent surgery. Small bowel paralytic ileus. No mechanical bowel obstruction. Wall thickening of the urinary bladder could be mild cystitis. Multiple hypoattenuating hepatic lesions are incompletely assessed on this study. There is hepatic steatosis. Bronchiectasis of unclear etiology.  Patient currently does not have any pulmonary symptoms, in the ER patient received Zofran and famotidine and fluids and is going to be admitted for further evaluation and management small bowel ileus and hepatic lesions.      Past Medical History    Past Medical History:   Diagnosis Date    Esophageal reflux        Past Surgical History   Past Surgical History:   Procedure Laterality Date    APPENDECTOMY      cystoscopy with pyeloscopy with removal of calculus.      DAVINCI LYSIS OF ADHESIONS N/A 2023    Procedure: Davinci Lysis of Adhesions;  Surgeon: Kamlesh Henry MD;  Location: [facility] OR    DAVINCI PROSTATECTOMY, LYMPHADENECTOMY Bilateral 2023    Procedure: PROSTATECTOMY, ROBOT-ASSISTED, USING DA AIDA XI, WITH PELVIC LYMPHADENECTOMY, LYSIS OF ADHESIONS;  Surgeon: Kamlesh Henry MD;  Location: [facility] OR    PROSTATE SURGERY      ROTATOR CUFF REPAIR RT/LT      surgery testis exploration of undescended.  1985    TESTICLE SURGERY      VASECTOMY         Prior to Admission Medications   Prior to Admission Medications   Prescriptions Last Dose Informant Patient Reported? Taking?   ACCU-CHEK GUIDE test strip   No No   Si strip by In Vitro route daily To test blood sugar   Cholecalciferol (VITAMIN D) 2000 UNITS tablet Past Week at pt states hasnt taken daily  Yes Yes   Sig: Take 1 tablet by mouth daily   acetaminophen (TYLENOL) 325 MG tablet 2023 at pm  No Yes   Sig: Take 2 tablets (650 mg) by mouth every 4 hours as needed for other (mild pain)   aspirin (ASA) 81 MG EC tablet 2023 at pm  Yes Yes   Sig: Take 81 mg by mouth daily   blood glucose  monitoring (ACCU-CHEK JHON PLUS) meter device kit   No No   Sig: Use to test blood sugar 1 times daily or as directed.   cetirizine (ZYRTEC) 10 MG tablet Past Week at prn  Yes Yes   Sig: Take 10 mg by mouth daily   hydrOXYzine HCl (ATARAX) 10 MG tablet Past Week at prn  No Yes   Sig: Take 1 tablet (10 mg) by mouth every 6 hours as needed for itching or anxiety (with pain, moderate pain)   metFORMIN (GLUCOPHAGE XR) 500 MG 24 hr tablet 12/7/2023 at pm  No Yes   Sig: Take one tab with breakfast and one tablet with dinner.   omeprazole (PRILOSEC) 20 MG DR capsule 12/7/2023 at pm  No Yes   Sig: Take 1 capsule (20 mg) by mouth daily   ondansetron (ZOFRAN ODT) 4 MG ODT tab Past Week at prn  No Yes   Sig: Take 1-2 tablets (4-8 mg) by mouth every 8 hours as needed for nausea Dissolve ON the tongue.   oxyCODONE (ROXICODONE) 5 MG tablet Past Week at tuesday  No Yes   Sig: Take 1 tablet (5 mg) by mouth every 6 hours as needed for pain   pravastatin (PRAVACHOL) 40 MG tablet Past Week at [facility] hasnt been taking daily  No Yes   Sig: Take 1 tablet (40 mg) by mouth daily   senna-docusate (SENOKOT-S/PERICOLACE) 8.6-50 MG tablet Past Week at tuesday  No Yes   Sig: Take 1 tablet by mouth 2 times daily as needed for constipation (and while on narcotic pain medication.)      Facility-Administered Medications: None        Review of Systems    The 10 point Review of Systems is negative other than noted in the HPI or here. Abdominal discomfort, nausea, vomiting, lack of bowel movement s/p prostatectomy    Physical Exam   Vital Signs: Temp: 97.9  F (36.6  C) Temp src: Oral BP: 124/83 Pulse: 86   Resp: 18 SpO2: 93 %      Weight: 180 lbs 0 oz    GENERAL: Patient was seen and examined at bedside with no acute distress  HENT:  Head is normocephalic, atraumatic.   EYES:  Eyes have anicteric sclerae without conjunctival injection   LUNGS: Bilateral air entry, clear to auscultation bilaterally  CARDIOVASCULAR:  Regular rate and rhythm with no  murmurs, gallops or rubs.  ABDOMEN: Hypoactive bowel sounds, distended, nontender  EXT: no edema    SKIN:  No acute rashes.   NEUROLOGIC:  Grossly nonfocal.      Medical Decision Making       80 MINUTES SPENT BY ME on the date of service doing chart review, history, exam, documentation & further activities per the note.      Data     I have personally reviewed the following data over the past 24 hrs:    8.8  \   14.4   / 317     133 (L) 96 (L) 23.7 (H) /  166 (H)   3.8 25 0.94 \     ALT: 33 AST: 22 AP: 61 TBILI: 0.9   ALB: 3.6 TOT PROTEIN: 6.5 LIPASE: N/A       Imaging results reviewed over the past 24 hrs:   Recent Results (from the past 24 hour(s))   CT Abdomen Pelvis w/o Contrast    Narrative    EXAM: CT ABDOMEN PELVIS W/O CONTRAST  LOCATION: [facility]  DATE: 12/8/2023    INDICATION: Postoperative vomiting  COMPARISON: 10/24/2023.   TECHNIQUE: CT scan of the abdomen and pelvis was performed without IV contrast. Multiplanar reformats were obtained. Dose reduction techniques were used.  CONTRAST: None.    FINDINGS:   LOWER CHEST: Bronchiectasis. Coronary artery disease.     HEPATOBILIARY: Hepatic steatosis. Multiple hypoattenuating hepatic lesions measuring up to 25 mm.     PANCREAS: Normal.    SPLEEN: Normal.    ADRENAL GLANDS: Normal.    KIDNEYS/BLADDER: Wall thickening of the urinary bladder. The bladder is not decompressed despite presence of a Petersen catheter.     BOWEL: Normal stomach. Multiple loops of small bowel are distended with a maximum diameter of 3.7 cm. Air-fluid levels are present. No transition point. The distal ileum is not distended. No CT evidence of appendicitis. There is colonic diverticulosis   without diverticulitis.     LYMPH NODES: Normal.    VASCULATURE: Mild atherosclerotic disease.     PELVIC ORGANS: Prostatectomy.     OTHER: Small amount of free air in the abdomen. No free fluid.    MUSCULOSKELETAL: Postsurgical changes in the abdominal wall.       Impression     IMPRESSION:   1.  Prostatectomy. Intraperitoneal free air is from recent surgery.   2.  Small bowel paralytic ileus. No mechanical bowel obstruction.  3.  Wall thickening of the urinary bladder could be mild cystitis.  4.  Multiple hypoattenuating hepatic lesions are incompletely assessed on this study. These could be better assessed with liver mass protocol MRI. There is hepatic steatosis.  5.  Bronchiectasis of unclear etiology.

## 2023-12-08 NOTE — TELEPHONE ENCOUNTER
AJITHI  . ED  physician  LISETTE Burgos  from [facility]  Called to say  he was being admitted for an ileus  but everything else PO is looking good.

## 2023-12-08 NOTE — CONSULTS
General Surgery Consultation  Agustin Nolan MRN# [id number]   Age/Sex: 78 year old male YOB: 1945     Reason for consult: 1. Postoperative ileus (H)            Requesting physician: Dr. Saad Gondal                   Assessment and Plan:   Assessment:  Abdominal distention, nausea and vomiting  Post-operative ileus    Plan:  - NPO for bowel rest  - NG tube for decompression if patient develops nausea/vomiting  - Monitor electrolytes and replete  - General surgery will continue to follow          Chief Complaint:     Chief Complaint   Patient presents with    Vomiting    Abdominal Pain        History is obtained from the patient and electronic health record    HPI:   Agustin Nolan is a 78 year old male PMH HLD, prostate cancer who is POD#4 s/p robot-assisted, laparoscopic lysis of adhesions, prostatectomy and pelvic lymphadenectomy who presented to the [facility] emergency department with abdominal distention, nausea and vomiting x 2 days.  Patient reports he was feeling abdominal discomfort and distention prior to discharge from the hospital on Tuesday.  He was able to eat solid food and liquids without nausea and vomiting and he was subsequently discharged home on Wednesday prior to admission.  Upon returning home, the patient reports developing worsening abdominal distention with associated nausea and vomiting.  He has been able to keep down liquids but has been vomiting when trying solid foods.  Given his ongoing discomfort and oral intolerance he presented to the emergency department for further evaluation.  Currently, he endorses abdominal distention and discomfort.  He last had some nausea and dry heaves this morning.  He endorses passing scant flatus but no bowel movement since surgery.  Patient's past surgical history also includes an appendectomy.          Past Medical History:     Past Medical History:   Diagnosis Date    Esophageal reflux               Past Surgical History:     Past Surgical  History:   Procedure Laterality Date    APPENDECTOMY      cystoscopy with pyeloscopy with removal of calculus.      DAVINCI LYSIS OF ADHESIONS N/A 12/4/2023    Procedure: Davinci Lysis of Adhesions;  Surgeon: Kamlesh Henry MD;  Location: [facility] OR    DAVINCI PROSTATECTOMY, LYMPHADENECTOMY Bilateral 12/4/2023    Procedure: PROSTATECTOMY, ROBOT-ASSISTED, USING DA AIDA XI, WITH PELVIC LYMPHADENECTOMY, LYSIS OF ADHESIONS;  Surgeon: Kamlesh Henry MD;  Location: [facility] OR    PROSTATE SURGERY      ROTATOR CUFF REPAIR RT/LT      surgery testis exploration of undescended.  1985    TESTICLE SURGERY      VASECTOMY               Social History:    reports that he has never smoked. He has never used smokeless tobacco. He reports current alcohol use of about 1.0 standard drink of alcohol per week. He reports that he does not use drugs.           Family History:   History reviewed. No pertinent family history.           Allergies:     Allergies   Allergen Reactions    Shellfish-Derived Products Rash    Cats      Hard time breathing, running nose              Medications:     Prior to Admission medications    Medication Sig Start Date End Date Taking? Authorizing Provider   acetaminophen (TYLENOL) 325 MG tablet Take 2 tablets (650 mg) by mouth every 4 hours as needed for other (mild pain) 12/4/23  Yes Kamlesh Henry MD   aspirin (ASA) 81 MG EC tablet Take 81 mg by mouth daily 4/18/21  Yes Vicente Tomlin MD   cetirizine (ZYRTEC) 10 MG tablet Take 10 mg by mouth daily   Yes Reported, Patient   Cholecalciferol (VITAMIN D) 2000 UNITS tablet Take 1 tablet by mouth daily   Yes Reported, Patient   hydrOXYzine HCl (ATARAX) 10 MG tablet Take 1 tablet (10 mg) by mouth every 6 hours as needed for itching or anxiety (with pain, moderate pain) 12/4/23  Yes Kamlesh Henry MD   metFORMIN (GLUCOPHAGE XR) 500 MG 24 hr tablet Take one tab with breakfast and one tablet with dinner. 5/4/23  Yes Vicente Tomlin  "MD   omeprazole (PRILOSEC) 20 MG DR capsule Take 1 capsule (20 mg) by mouth daily 10/3/23  Yes Vicente Tomlin MD   ondansetron (ZOFRAN ODT) 4 MG ODT tab Take 1-2 tablets (4-8 mg) by mouth every 8 hours as needed for nausea Dissolve ON the tongue. 12/4/23  Yes Kamlesh Henry MD   oxyCODONE (ROXICODONE) 5 MG tablet Take 1 tablet (5 mg) by mouth every 6 hours as needed for pain 12/5/23 12/8/23 Yes Precious Toro PA-C   pravastatin (PRAVACHOL) 40 MG tablet Take 1 tablet (40 mg) by mouth daily 7/5/23  Yes Vicente Tomlin MD   senna-docusate (SENOKOT-S/PERICOLACE) 8.6-50 MG tablet Take 1 tablet by mouth 2 times daily as needed for constipation (and while on narcotic pain medication.) 12/5/23  Yes Precious Toro PA-C   ACCU-CHEK GUIDE test strip 1 strip by In Vitro route daily To test blood sugar 4/7/23   Vicente Tomlin MD   blood glucose monitoring (ACCU-CHEK JHON PLUS) meter device kit Use to test blood sugar 1 times daily or as directed. 6/7/21   Vicente Tomlin MD              Review of Systems:   The Review of Systems is negative other than noted in the HPI            Physical Exam:   Patient Vitals for the past 24 hrs:   BP Temp Temp src Pulse Resp SpO2 Height Weight   12/08/23 1330 124/83 -- -- 86 -- 93 % -- --   12/08/23 1300 123/80 -- -- 84 -- 93 % -- --   12/08/23 1233 126/76 -- -- 89 -- 91 % -- --   12/08/23 1203 126/79 -- -- 81 -- 92 % -- --   12/08/23 1133 124/74 -- -- -- -- -- -- --   12/08/23 1103 129/77 -- -- 81 -- 90 % -- --   12/08/23 1033 (!) 155/77 -- -- 83 -- 92 % -- --   12/08/23 1018 135/73 -- -- 85 -- 90 % -- --   12/08/23 0853 130/75 97.9  F (36.6  C) Oral (!) 124 18 95 % 1.727 m (5' 8\") 81.6 kg (180 lb)        No intake or output data in the 24 hours ending 12/08/23 9731   Constitutional:   awake, alert, cooperative, no apparent distress, and appears stated age       Eyes:   PERRL, conjunctiva/corneas clear, EOM's intact; no scleral edema or " icterus noted        ENT:   Normocephalic, without obvious abnormality, atraumatic, Lips, mucosa, and tongue normal        Lungs:   Normal respiratory effort, no accessory muscle use       Cardiovascular:   Regular rate and rhythm       Abdomen:   Softly distended, mildly tender to palpation over surgical incisions.  No rebound or guarding.       Musculoskeletal:   No obvious swelling, bruising or deformity       Skin:   Skin color and texture normal for patient, no rashes or lesions              Data:         All imaging studies reviewed by me.    Results for orders placed or performed during the hospital encounter of 12/08/23 (from the past 24 hour(s))   Comprehensive metabolic panel   Result Value Ref Range    Sodium 133 (L) 135 - 145 mmol/L    Potassium 3.8 3.4 - 5.3 mmol/L    Carbon Dioxide (CO2) 25 22 - 29 mmol/L    Anion Gap 12 7 - 15 mmol/L    Urea Nitrogen 23.7 (H) 8.0 - 23.0 mg/dL    Creatinine 0.94 0.67 - 1.17 mg/dL    GFR Estimate 84 >60 mL/min/1.73m2    Calcium 9.8 8.8 - 10.2 mg/dL    Chloride 96 (L) 98 - 107 mmol/L    Glucose 211 (H) 70 - 99 mg/dL    Alkaline Phosphatase 61 40 - 150 U/L    AST 22 0 - 45 U/L    ALT 33 0 - 70 U/L    Protein Total 6.5 6.4 - 8.3 g/dL    Albumin 3.6 3.5 - 5.2 g/dL    Bilirubin Total 0.9 <=1.2 mg/dL   CBC (+ platelets, no diff)   Result Value Ref Range    WBC Count 8.8 4.0 - 11.0 10e3/uL    RBC Count 4.85 4.40 - 5.90 10e6/uL    Hemoglobin 14.4 13.3 - 17.7 g/dL    Hematocrit 43.0 40.0 - 53.0 %    MCV 89 78 - 100 fL    MCH 29.7 26.5 - 33.0 pg    MCHC 33.5 31.5 - 36.5 g/dL    RDW 13.3 10.0 - 15.0 %    Platelet Count 317 150 - 450 10e3/uL   Magnesium   Result Value Ref Range    Magnesium 1.8 1.7 - 2.3 mg/dL   Phosphorus   Result Value Ref Range    Phosphorus 2.9 2.5 - 4.5 mg/dL   CT Abdomen Pelvis w/o Contrast    Narrative    EXAM: CT ABDOMEN PELVIS W/O CONTRAST  LOCATION: [facility]  DATE: 12/8/2023    INDICATION: Postoperative vomiting  COMPARISON:  10/24/2023.   TECHNIQUE: CT scan of the abdomen and pelvis was performed without IV contrast. Multiplanar reformats were obtained. Dose reduction techniques were used.  CONTRAST: None.    FINDINGS:   LOWER CHEST: Bronchiectasis. Coronary artery disease.     HEPATOBILIARY: Hepatic steatosis. Multiple hypoattenuating hepatic lesions measuring up to 25 mm.     PANCREAS: Normal.    SPLEEN: Normal.    ADRENAL GLANDS: Normal.    KIDNEYS/BLADDER: Wall thickening of the urinary bladder. The bladder is not decompressed despite presence of a Petersen catheter.     BOWEL: Normal stomach. Multiple loops of small bowel are distended with a maximum diameter of 3.7 cm. Air-fluid levels are present. No transition point. The distal ileum is not distended. No CT evidence of appendicitis. There is colonic diverticulosis   without diverticulitis.     LYMPH NODES: Normal.    VASCULATURE: Mild atherosclerotic disease.     PELVIC ORGANS: Prostatectomy.     OTHER: Small amount of free air in the abdomen. No free fluid.    MUSCULOSKELETAL: Postsurgical changes in the abdominal wall.       Impression    IMPRESSION:   1.  Prostatectomy. Intraperitoneal free air is from recent surgery.   2.  Small bowel paralytic ileus. No mechanical bowel obstruction.  3.  Wall thickening of the urinary bladder could be mild cystitis.  4.  Multiple hypoattenuating hepatic lesions are incompletely assessed on this study. These could be better assessed with liver mass protocol MRI. There is hepatic steatosis.  5.  Bronchiectasis of unclear etiology.             Mariangel Barlow PA-C  [facility] Surgery  [facility] [address]

## 2023-12-08 NOTE — MEDICATION SCRIBE - ADMISSION MEDICATION HISTORY
Medication Scribe Admission Medication History    Admission medication history is complete. The information provided in this note is only as accurate as the sources available at the time of the update.    Information Source(s): Patient and CareEverywhere/SureScripts via in-person    Pertinent Information: pt states hasn't been taking most daily med's past week due to feeling nauseas and vomiting      Changes made to PTA medication list:  Added: None  Deleted: None  Changed: None    Medication Affordability:  Not including over the counter (OTC) medications, was there a time in the past 3 months when you did not take your medications as prescribed because of cost?: No    Allergies reviewed with patient and updates made in EHR: yes    Medication History Completed By: REBEKA SOL 12/8/2023 2:00 PM    PTA Med List   Medication Sig Last Dose    acetaminophen (TYLENOL) 325 MG tablet Take 2 tablets (650 mg) by mouth every 4 hours as needed for other (mild pain) 12/7/2023 at pm    aspirin (ASA) 81 MG EC tablet Take 81 mg by mouth daily 12/7/2023 at pm    cetirizine (ZYRTEC) 10 MG tablet Take 10 mg by mouth daily Past Week at prn    Cholecalciferol (VITAMIN D) 2000 UNITS tablet Take 1 tablet by mouth daily Past Week at pt states hasnt taken daily    hydrOXYzine HCl (ATARAX) 10 MG tablet Take 1 tablet (10 mg) by mouth every 6 hours as needed for itching or anxiety (with pain, moderate pain) Past Week at prn    metFORMIN (GLUCOPHAGE XR) 500 MG 24 hr tablet Take one tab with breakfast and one tablet with dinner. 12/7/2023 at pm    omeprazole (PRILOSEC) 20 MG DR capsule Take 1 capsule (20 mg) by mouth daily 12/7/2023 at pm    ondansetron (ZOFRAN ODT) 4 MG ODT tab Take 1-2 tablets (4-8 mg) by mouth every 8 hours as needed for nausea Dissolve ON the tongue. Past Week at prn    oxyCODONE (ROXICODONE) 5 MG tablet Take 1 tablet (5 mg) by mouth every 6 hours as needed for pain Past Week at tuesday    pravastatin (PRAVACHOL) 40  MG tablet Take 1 tablet (40 mg) by mouth daily Past Week at [facility] hasnt been taking daily    senna-docusate (SENOKOT-S/PERICOLACE) 8.6-50 MG tablet Take 1 tablet by mouth 2 times daily as needed for constipation (and while on narcotic pain medication.) Past Week at tuesday

## 2023-12-09 ENCOUNTER — APPOINTMENT (OUTPATIENT)
Dept: RADIOLOGY | Facility: HOSPITAL | Age: 78
DRG: 394 | End: 2023-12-09
Attending: SURGERY
Payer: MEDICARE

## 2023-12-09 LAB
ALBUMIN SERPL BCG-MCNC: 3.4 G/DL (ref 3.5–5.2)
ALP SERPL-CCNC: 57 U/L (ref 40–150)
ALT SERPL W P-5'-P-CCNC: 24 U/L (ref 0–70)
ANION GAP SERPL CALCULATED.3IONS-SCNC: 16 MMOL/L (ref 7–15)
AST SERPL W P-5'-P-CCNC: 22 U/L (ref 0–45)
BILIRUB SERPL-MCNC: 0.8 MG/DL
BUN SERPL-MCNC: 24.8 MG/DL (ref 8–23)
CALCIUM SERPL-MCNC: 8.4 MG/DL (ref 8.8–10.2)
CHLORIDE SERPL-SCNC: 103 MMOL/L (ref 98–107)
CREAT SERPL-MCNC: 0.91 MG/DL (ref 0.67–1.17)
DEPRECATED HCO3 PLAS-SCNC: 20 MMOL/L (ref 22–29)
EGFRCR SERPLBLD CKD-EPI 2021: 87 ML/MIN/1.73M2
ERYTHROCYTE [DISTWIDTH] IN BLOOD BY AUTOMATED COUNT: 13.3 % (ref 10–15)
GLUCOSE BLDC GLUCOMTR-MCNC: 139 MG/DL (ref 70–99)
GLUCOSE BLDC GLUCOMTR-MCNC: 147 MG/DL (ref 70–99)
GLUCOSE BLDC GLUCOMTR-MCNC: 154 MG/DL (ref 70–99)
GLUCOSE BLDC GLUCOMTR-MCNC: 156 MG/DL (ref 70–99)
GLUCOSE BLDC GLUCOMTR-MCNC: 175 MG/DL (ref 70–99)
GLUCOSE SERPL-MCNC: 178 MG/DL (ref 70–99)
HCT VFR BLD AUTO: 41.1 % (ref 40–53)
HGB BLD-MCNC: 13.5 G/DL (ref 13.3–17.7)
LACTATE SERPL-SCNC: 1.3 MMOL/L (ref 0.7–2)
MAGNESIUM SERPL-MCNC: 2.2 MG/DL (ref 1.7–2.3)
MCH RBC QN AUTO: 29.2 PG (ref 26.5–33)
MCHC RBC AUTO-ENTMCNC: 32.8 G/DL (ref 31.5–36.5)
MCV RBC AUTO: 89 FL (ref 78–100)
PHOSPHATE SERPL-MCNC: 2.8 MG/DL (ref 2.5–4.5)
PLATELET # BLD AUTO: 306 10E3/UL (ref 150–450)
POTASSIUM SERPL-SCNC: 4 MMOL/L (ref 3.4–5.3)
PROT SERPL-MCNC: 6.4 G/DL (ref 6.4–8.3)
RBC # BLD AUTO: 4.62 10E6/UL (ref 4.4–5.9)
SODIUM SERPL-SCNC: 139 MMOL/L (ref 135–145)
WBC # BLD AUTO: 3.5 10E3/UL (ref 4–11)

## 2023-12-09 PROCEDURE — G0378 HOSPITAL OBSERVATION PER HR: HCPCS

## 2023-12-09 PROCEDURE — 96361 HYDRATE IV INFUSION ADD-ON: CPT

## 2023-12-09 PROCEDURE — 120N000001 HC R&B MED SURG/OB

## 2023-12-09 PROCEDURE — 84100 ASSAY OF PHOSPHORUS: CPT | Performed by: STUDENT IN AN ORGANIZED HEALTH CARE EDUCATION/TRAINING PROGRAM

## 2023-12-09 PROCEDURE — 83605 ASSAY OF LACTIC ACID: CPT | Performed by: STUDENT IN AN ORGANIZED HEALTH CARE EDUCATION/TRAINING PROGRAM

## 2023-12-09 PROCEDURE — 250N000011 HC RX IP 250 OP 636: Performed by: STUDENT IN AN ORGANIZED HEALTH CARE EDUCATION/TRAINING PROGRAM

## 2023-12-09 PROCEDURE — 36415 COLL VENOUS BLD VENIPUNCTURE: CPT | Performed by: STUDENT IN AN ORGANIZED HEALTH CARE EDUCATION/TRAINING PROGRAM

## 2023-12-09 PROCEDURE — 96372 THER/PROPH/DIAG INJ SC/IM: CPT | Performed by: STUDENT IN AN ORGANIZED HEALTH CARE EDUCATION/TRAINING PROGRAM

## 2023-12-09 PROCEDURE — 250N000013 HC RX MED GY IP 250 OP 250 PS 637: Performed by: STUDENT IN AN ORGANIZED HEALTH CARE EDUCATION/TRAINING PROGRAM

## 2023-12-09 PROCEDURE — 99233 SBSQ HOSP IP/OBS HIGH 50: CPT | Performed by: STUDENT IN AN ORGANIZED HEALTH CARE EDUCATION/TRAINING PROGRAM

## 2023-12-09 PROCEDURE — 258N000003 HC RX IP 258 OP 636: Performed by: STUDENT IN AN ORGANIZED HEALTH CARE EDUCATION/TRAINING PROGRAM

## 2023-12-09 PROCEDURE — C9113 INJ PANTOPRAZOLE SODIUM, VIA: HCPCS | Performed by: STUDENT IN AN ORGANIZED HEALTH CARE EDUCATION/TRAINING PROGRAM

## 2023-12-09 PROCEDURE — 74018 RADEX ABDOMEN 1 VIEW: CPT

## 2023-12-09 PROCEDURE — 82962 GLUCOSE BLOOD TEST: CPT

## 2023-12-09 PROCEDURE — 83735 ASSAY OF MAGNESIUM: CPT | Performed by: STUDENT IN AN ORGANIZED HEALTH CARE EDUCATION/TRAINING PROGRAM

## 2023-12-09 PROCEDURE — 99231 SBSQ HOSP IP/OBS SF/LOW 25: CPT | Mod: 24 | Performed by: PHYSICIAN ASSISTANT

## 2023-12-09 PROCEDURE — 80053 COMPREHEN METABOLIC PANEL: CPT | Performed by: STUDENT IN AN ORGANIZED HEALTH CARE EDUCATION/TRAINING PROGRAM

## 2023-12-09 PROCEDURE — 85027 COMPLETE CBC AUTOMATED: CPT | Performed by: STUDENT IN AN ORGANIZED HEALTH CARE EDUCATION/TRAINING PROGRAM

## 2023-12-09 RX ORDER — PANTOPRAZOLE SODIUM 40 MG/1
40 TABLET, DELAYED RELEASE ORAL
Status: DISCONTINUED | OUTPATIENT
Start: 2023-12-10 | End: 2023-12-10 | Stop reason: HOSPADM

## 2023-12-09 RX ADMIN — SODIUM CHLORIDE: 9 INJECTION, SOLUTION INTRAVENOUS at 01:46

## 2023-12-09 RX ADMIN — HEPARIN SODIUM 5000 UNITS: 5000 INJECTION, SOLUTION INTRAVENOUS; SUBCUTANEOUS at 22:07

## 2023-12-09 RX ADMIN — PHENOL 1 ML: 1.4 SPRAY ORAL at 05:35

## 2023-12-09 RX ADMIN — HEPARIN SODIUM 5000 UNITS: 5000 INJECTION, SOLUTION INTRAVENOUS; SUBCUTANEOUS at 02:44

## 2023-12-09 RX ADMIN — PANTOPRAZOLE SODIUM 40 MG: 40 INJECTION, POWDER, FOR SOLUTION INTRAVENOUS at 09:15

## 2023-12-09 RX ADMIN — DEXTROSE AND SODIUM CHLORIDE: 5; 900 INJECTION, SOLUTION INTRAVENOUS at 12:16

## 2023-12-09 RX ADMIN — HEPARIN SODIUM 5000 UNITS: 5000 INJECTION, SOLUTION INTRAVENOUS; SUBCUTANEOUS at 14:54

## 2023-12-09 RX ADMIN — DIATRIZOATE MEGLUMINE AND DIATRIZOATE SODIUM 120 ML: 660; 100 SOLUTION ORAL; RECTAL at 01:44

## 2023-12-09 RX ADMIN — PHENOL 1 ML: 1.4 SPRAY ORAL at 03:07

## 2023-12-09 ASSESSMENT — ACTIVITIES OF DAILY LIVING (ADL)
ADLS_ACUITY_SCORE: 22
ADLS_ACUITY_SCORE: 22
ADLS_ACUITY_SCORE: 24
ADLS_ACUITY_SCORE: 22
ADLS_ACUITY_SCORE: 22
ADLS_ACUITY_SCORE: 24
ADLS_ACUITY_SCORE: 22
ADLS_ACUITY_SCORE: 24
ADLS_ACUITY_SCORE: 22
ADLS_ACUITY_SCORE: 22
ADLS_ACUITY_SCORE: 24
ADLS_ACUITY_SCORE: 22

## 2023-12-09 NOTE — PLAN OF CARE
"PRIMARY DIAGNOSIS: \"GENERIC\" NURSING  OUTPATIENT/OBSERVATION GOALS TO BE MET BEFORE DISCHARGE:  ADLs back to baseline: Yes    Activity and level of assistance: Ambulating independently.    Pain status: Pain free.    Return to near baseline physical activity: Yes     Discharge Planner Nurse   Safe discharge environment identified: Yes  Barriers to discharge: Yes --- pending results from xray gastrografin challenge completed at 1045 am. Diet progression and IV fluid maintenance until adequate PO intake.        Entered by: Allison Mckeon RN 12/09/2023      Please review provider order for any additional goals.   Nurse to notify provider when observation goals have been met and patient is ready for discharge.  "

## 2023-12-09 NOTE — PROGRESS NOTES
[facility] Progress Note - Hospitalist Service    Date of Admission:  12/8/2023    Assessment & Plan   Agustin Nolan is a 78 year old man who presented to the ER with complaint of abdominal discomfort, nausea, vomiting, lack of bowel movement s/p prostatectomy. Labs were significant for mild hyponatremia, hypochloremia. CT scan of the abdomen was performed which showed small bowel paralytic ileus. No mechanical bowel obstruction. Intraperitoneal free air from recent surgery. Also mentioning possible cystitis and hypo-attenuating hepatic lesions that were incompletely assessed on imaging.     Small bowel ileus s/p prostatectomy    -continue IVF  -PRN antiemesis meds and PRN pain regimen  -Bowel regimen with BID Dulcolax  -kieran consulted and signed off  -NG tube self-removed this AM, CTM and do not need to place new NG unless pain, nausea or vomiting occurs   -diet advanced per surgery     Anion gap acidosis  -likely starvation ketosis. Poor oral intake since surgery with UA showing ketones.  -will check lactate  -add dextrose to IVF, will switch from NS to D5 with NS  -once able to tolerate better oral intake can discontinue fluids     Hepatic lesions on CT  -MRI abdomen showing benign hepatic cysts requiring no follow-up     Cystitis on CT  -patient without urinary sx  -follow-up on UA and cx done on admission    Diabetes  -hold home metformin   -continue inpatient insulin regimen currently medium dose sliding scale insulin   -monitor blood glucose, titrate insulin as needed to achieve goal     HLD  -restart home statin now that diet is advanced     GERD  -discontinue IV PPI and restart home PPI          Diet: Advance Diet as Tolerated: Clear Liquid Diet; Regular Diet Adult    DVT Prophylaxis: subcutaneous heparin   Petersen Catheter: PRESENT, indication: /GI/GYN Pelvic Procedure  Lines: None     Cardiac Monitoring: None  Code Status: Full Code      Clinically Significant Risk  "Factors Present on Admission          # Hypocalcemia: Lowest Ca = 8.4 mg/dL in last 2 days, will monitor and replace as appropriate     # Hypoalbuminemia: Lowest albumin = 3.4 g/dL at 12/9/2023  5:49 AM, will monitor as appropriate   # Drug Induced Platelet Defect: home medication list includes an antiplatelet medication       # DMII: A1C = 7.5 % (Ref range: <5.7 %) within past 6 months    # Overweight: Estimated body mass index is 26.95 kg/m  as calculated from the following:    Height as of this encounter: 1.727 m (5' 8\").    Weight as of this encounter: 80.4 kg (177 lb 4 oz).              Disposition Plan 12-24 hours      Expected Discharge Date: 12/10/2023      Destination: home              Hayden Pak DO  Hospitalist Service  [facility]  Securely message with [employer] (more info)  Text page via [employer] Paging/Directory   ______________________________________________________________________    Interval History   Feeling better this morning after getting fluids overnight. Was able to tolerate some sips of water without issue. Diet being advanced.    Physical Exam   Vital Signs: Temp: 98.6  F (37  C) Temp src: Oral BP: (!) 153/87 Pulse: 84   Resp: 20 SpO2: 92 % O2 Device: None (Room air)    Weight: 177 lbs 4 oz    General Appearance: No acute distress  Respiratory: CTAB  Cardiovascular: RRR  GI: normoactive sounds without rebound, guarding or pain      Medical Decision Making       55 MINUTES SPENT BY ME on the date of service doing chart review, history, exam, documentation & further activities per the note.      Data     I have personally reviewed the following data over the past 24 hrs:    3.5 (L)  \   13.5   / 306     139 103 24.8 (H) /  147 (H)   4.0 20 (L) 0.91 \     ALT: 24 AST: 22 AP: 57 TBILI: 0.8   ALB: 3.4 (L) TOT PROTEIN: 6.4 LIPASE: N/A     Procal: N/A CRP: N/A Lactic Acid: 1.3         Imaging results reviewed over the past 24 hrs:   Recent Results (from the past 24 hour(s)) "   MR Abdomen w/o & w Contrast    Narrative    EXAM: MR ABDOMEN W/O and W CONTRAST  LOCATION: [facility]  DATE: 12/8/2023    INDICATION: hepatic mass protocol for hepatic lesions seen on CT  COMPARISON: CT abdomen/pelvis without contrast 12/08/2023  TECHNIQUE: Routine MRI abdomen protocol including T1 in/out phase, diffusion, multiplane T2, and dynamic T1 with IV contrast.   CONTRAST: 8ml Gadavist    FINDINGS:    LOWER CHEST: The visualized lung bases are clear.    LIVER: Multiple benign hepatic cysts. Tiny shunt of the peripheral right hepatic lobe. No dedicated follow-up required for these lesions. No suspicious hepatic lesion. Hepatic steatosis.     GALLBLADDER/BILIARY: Unremarkable gallbladder. No biliary duct dilatation.    SPLEEN: Unremarkable.    PANCREAS: Atrophic, otherwise unremarkable.    ADRENAL GLANDS: Unremarkable.    KIDNEYS: Benign renal cysts which require no dedicated follow-up. No hydronephrosis.    BOWEL: Colonic diverticulosis. No significant change in prominent fluid and gas filled loops of small bowel, favoring adynamic ileus in the early postoperative setting.    ASCITES: No significant ascites. Trace postoperative free intraperitoneal gas.    LYMPH NODES: No lymphadenopathy.    VASCULATURE: No abdominal aortic aneurysm.    MUSCULOSKELETAL: No acute osseous abnormality or suspicious osseous lesion. Multilevel degenerative changes of the thoracic and lumbosacral spine.      Impression    IMPRESSION:     1.  Benign hepatic cysts requiring no dedicated follow-up. No suspicious hepatic lesion.  2.  Hepatic steatosis.  3.  No significant change in prominent fluid and gas filled loops of small bowel, favoring adynamic ileus in the early postoperative setting.   XR Abdomen Port 1 View    Narrative    EXAM: XR ABDOMEN PORT 1 VIEW  LOCATION: [facility]  DATE: 12/9/2023    INDICATION: Check NG placement  COMPARISON: CT 12/08/2023      Impression     IMPRESSION: Enteric drainage tube tip and sidehole in the stomach. Mildly dilated loops of small bowel again identified. No evidence of free air.

## 2023-12-09 NOTE — PLAN OF CARE
[id number]    Following xray results, clear liquid diet tolerated without nausea and vomiting or abdominal discomfort.     Petersen in place per surgical requirements from prior procedure 12/4/23 at [facility]. Patient able to assist with emptying.     Large loose BM following oral intake.     L AC PIV patent and infusing ordered mIVf.     BG checks per orders - 1 unit of insulin given with lunch.     Denies pain. Independent in room.     Family at bedside.     Problem: Adult Inpatient Plan of Care  Goal: Optimal Comfort and Wellbeing  Outcome: Progressing     Problem: Pain Acute  Goal: Optimal Pain Control and Function  Outcome: Progressing     Problem: Nausea and Vomiting  Goal: Nausea and Vomiting Relief  Outcome: Progressing     Problem: Constipation  Goal: Effective Bowel Elimination  Outcome: Progressing

## 2023-12-09 NOTE — PROGRESS NOTES
General Surgery Progress Note:    Hospital Day # 0    ASSESSMENT:   1. Postoperative ileus (H)        Agustin Nolan is a 78 year old male who is POD#5 s/p robot-assisted, laparoscopic AMEE, prostatectomy and pelvic lymphadenectomy who was admitted with an ileus. NPO with NG tube overnight, which patient self discontinued early this morning.  Endorses improvement in abdominal pain and distention.  Passing flatus and stools.  Recommend slowly advance diet as tolerated.    PLAN:   -Okay to advance diet as tolerated to regular diet. Recommend start with clears and advance if tolerates  -Continue to encourage ambulation to continue to promote bowel function  -If patient able to tolerate a diet without further nausea, vomiting and continued bowel function okay for patient to discharge from a surgical standpoint if deemed medically appropriate  -Recommend patient follow-up with urology as scheduled  -General surgery will sign off at this time.  Please page/call if further questions      SUBJECTIVE:   Agustin Nolan is feeling much better this morning.  Reports overnight he started passing several bowel movements and continues to pass gas.  Due to the discomfort from his nasogastric tube he removed this himself earlier this morning.  Voiding and ambulating independently.  Denies abdominal pain, nausea, vomiting.  Continues to endorse abdominal distention but reports this is improved.    Patient Vitals for the past 24 hrs:   BP Temp Temp src Pulse Resp SpO2 Weight   12/09/23 0709 136/82 98.6  F (37  C) Oral 83 20 94 % --   12/09/23 0350 (!) 140/76 98.8  F (37.1  C) Oral 85 20 93 % --   12/08/23 2359 139/82 98.1  F (36.7  C) Oral 90 22 92 % --   12/08/23 1849 (!) 148/75 99.1  F (37.3  C) Oral 83 18 93 % --   12/08/23 1706 (!) 127/97 98.2  F (36.8  C) Oral 76 18 97 % 80.4 kg (177 lb 4 oz)   12/08/23 1330 124/83 -- -- 86 -- 93 % --   12/08/23 1300 123/80 -- -- 84 -- 93 % --   12/08/23 1233 126/76 -- -- 89 -- 91 % --   12/08/23  1203 126/79 -- -- 81 -- 92 % --   12/08/23 1133 124/74 -- -- -- -- -- --   12/08/23 1103 129/77 -- -- 81 -- 90 % --   12/08/23 1033 (!) 155/77 -- -- 83 -- 92 % --       Physical Exam:  General: patient seen resting in bed, no acute distress  Resp: no respiratory distress, breathing comfortably on room air  Abdomen: Softly distended, minimally tender to palpation over surgical incisions.  Surgical incisions clean/dry/intact with overlying Dermabond.  Extremities: warm and well perfused    Admission on 12/08/2023   Component Date Value    Sodium 12/08/2023 133 (L)     Potassium 12/08/2023 3.8     Carbon Dioxide (CO2) 12/08/2023 25     Anion Gap 12/08/2023 12     Urea Nitrogen 12/08/2023 23.7 (H)     Creatinine 12/08/2023 0.94     GFR Estimate 12/08/2023 84     Calcium 12/08/2023 9.8     Chloride 12/08/2023 96 (L)     Glucose 12/08/2023 211 (H)     Alkaline Phosphatase 12/08/2023 61     AST 12/08/2023 22     ALT 12/08/2023 33     Protein Total 12/08/2023 6.5     Albumin 12/08/2023 3.6     Bilirubin Total 12/08/2023 0.9     WBC Count 12/08/2023 8.8     RBC Count 12/08/2023 4.85     Hemoglobin 12/08/2023 14.4     Hematocrit 12/08/2023 43.0     MCV 12/08/2023 89     MCH 12/08/2023 29.7     MCHC 12/08/2023 33.5     RDW 12/08/2023 13.3     Platelet Count 12/08/2023 317     Magnesium 12/08/2023 1.8     Phosphorus 12/08/2023 2.9     GLUCOSE BY METER POCT 12/08/2023 166 (H)     Color Urine 12/08/2023 Yellow     Appearance Urine 12/08/2023 Clear     Glucose Urine 12/08/2023 70 (A)     Bilirubin Urine 12/08/2023 Negative     Ketones Urine 12/08/2023 60 (A)     Specific Gravity Urine 12/08/2023 1.032 (H)     Blood Urine 12/08/2023 1.0 mg/dL (A)     pH Urine 12/08/2023 5.5     Protein Albumin Urine 12/08/2023 30 (A)     Urobilinogen Urine 12/08/2023 <2.0     Nitrite Urine 12/08/2023 Negative     Leukocyte Esterase Urine 12/08/2023 250 Morgan/uL (A)     Mucus Urine 12/08/2023 Present (A)     RBC Urine 12/08/2023 112 (H)     WBC Urine  12/08/2023 13 (H)     Hyaline Casts Urine 12/08/2023 10 (H)     GLUCOSE BY METER POCT 12/08/2023 162 (H)     Sodium 12/09/2023 139     Potassium 12/09/2023 4.0     Carbon Dioxide (CO2) 12/09/2023 20 (L)     Anion Gap 12/09/2023 16 (H)     Urea Nitrogen 12/09/2023 24.8 (H)     Creatinine 12/09/2023 0.91     GFR Estimate 12/09/2023 87     Calcium 12/09/2023 8.4 (L)     Chloride 12/09/2023 103     Glucose 12/09/2023 178 (H)     Alkaline Phosphatase 12/09/2023 57     AST 12/09/2023 22     ALT 12/09/2023 24     Protein Total 12/09/2023 6.4     Albumin 12/09/2023 3.4 (L)     Bilirubin Total 12/09/2023 0.8     WBC Count 12/09/2023 3.5 (L)     RBC Count 12/09/2023 4.62     Hemoglobin 12/09/2023 13.5     Hematocrit 12/09/2023 41.1     MCV 12/09/2023 89     MCH 12/09/2023 29.2     MCHC 12/09/2023 32.8     RDW 12/09/2023 13.3     Platelet Count 12/09/2023 306     Magnesium 12/09/2023 2.2     Phosphorus 12/09/2023 2.8     GLUCOSE BY METER POCT 12/09/2023 154 (H)     Lactic Acid 12/09/2023 1.3     GLUCOSE BY METER POCT 12/09/2023 139 (H)         Mariangel Barlow PA-C  [facility]  [address]  [address]  [place] [address]

## 2023-12-09 NOTE — PLAN OF CARE
XL watery BM overnight, abdomen soft, denies nausea or abdominal pain, audible bowel sounds, NG has been clamped since gastrografin given at 0145.      Goal Outcome Evaluation:  Problem: Pain Acute  Goal: Optimal Pain Control and Function  Outcome: Progressing  Intervention: Develop Pain Management Plan  Recent Flowsheet Documentation  Taken 12/9/2023 0145 by Liane White RN  Pain Management Interventions: MD notified (comment)     Problem: Nausea and Vomiting  Goal: Nausea and Vomiting Relief  Outcome: Progressing     Problem: Constipation  Goal: Effective Bowel Elimination  Outcome: Progressing

## 2023-12-09 NOTE — PLAN OF CARE
PRIMARY DIAGNOSIS: Postop ileus    OUTPATIENT/OBSERVATION GOALS TO BE MET BEFORE DISCHARGE  1. Orthostatic performed: N/A    2. Tolerating PO fluid and/or antibiotics (if applicable): No-NPO    3. Nausea/Vomiting/Diarrhea symptoms improved: Yes    4. Pain status: Improved with use of alternative comfort measures i.e.: positioning    5. Return to near baseline physical activity: Yes    Discharge Planner Nurse   Safe discharge environment identified: Yes  Barriers to discharge: Yes       Entered by: Mayra Sosa RN 12/08/2023 10:29 PM    Patient arrived to floor around 1700. Mild pain reported. Pt IND in room. VSS- mild HTN. No N&V. Small stool x1. Pt went down for MRI. Dressing change done x3 where old JOSE drain was. Serous-serosanguinous drainage.     Mayra Sosa RN

## 2023-12-09 NOTE — PLAN OF CARE
"PRIMARY DIAGNOSIS: \"GENERIC\" NURSING  OUTPATIENT/OBSERVATION GOALS TO BE MET BEFORE DISCHARGE:  ADLs back to baseline: No    Activity and level of assistance: Up with standby assistance.    Pain status: Improved with use of alternative comfort measures i.e.: NG placed    Return to near baseline physical activity: No     Discharge Planner Nurse   Safe discharge environment identified: No  Barriers to discharge: Yes, NG placed, gastrografin given, needs further imaging.       Entered by: Liane White RN 12/09/2023 8:19 AM     Please review provider order for any additional goals.   Nurse to notify provider when observation goals have been met and patient is ready for discharge.Goal Outcome Evaluation:                        "

## 2023-12-09 NOTE — PLAN OF CARE
PRIMARY DIAGNOSIS: Postop Ileus    OUTPATIENT/OBSERVATION GOALS TO BE MET BEFORE DISCHARGE  1. Orthostatic performed: No    2. Tolerating PO fluid and/or antibiotics (if applicable): No-NPO     3. Nausea/Vomiting/Diarrhea symptoms improved: Yes    4. Pain status: Improved with use of alternative comfort measures i.e.: distraction     5. Return to near baseline physical activity: Yes    Discharge Planner Nurse   Safe discharge environment identified: Yes  Barriers to discharge: Yes       Entered by: Mayra Sosa RN 12/08/2023 8:34 PM

## 2023-12-10 VITALS
OXYGEN SATURATION: 96 % | HEART RATE: 66 BPM | WEIGHT: 177.25 LBS | RESPIRATION RATE: 18 BRPM | SYSTOLIC BLOOD PRESSURE: 155 MMHG | TEMPERATURE: 98.1 F | BODY MASS INDEX: 26.86 KG/M2 | HEIGHT: 68 IN | DIASTOLIC BLOOD PRESSURE: 94 MMHG

## 2023-12-10 LAB
ANION GAP SERPL CALCULATED.3IONS-SCNC: 9 MMOL/L (ref 7–15)
BACTERIA UR CULT: NO GROWTH
BUN SERPL-MCNC: 17.6 MG/DL (ref 8–23)
CALCIUM SERPL-MCNC: 8.5 MG/DL (ref 8.8–10.2)
CHLORIDE SERPL-SCNC: 104 MMOL/L (ref 98–107)
CREAT SERPL-MCNC: 0.89 MG/DL (ref 0.67–1.17)
DEPRECATED HCO3 PLAS-SCNC: 27 MMOL/L (ref 22–29)
EGFRCR SERPLBLD CKD-EPI 2021: 89 ML/MIN/1.73M2
GLUCOSE BLDC GLUCOMTR-MCNC: 131 MG/DL (ref 70–99)
GLUCOSE BLDC GLUCOMTR-MCNC: 149 MG/DL (ref 70–99)
GLUCOSE SERPL-MCNC: 145 MG/DL (ref 70–99)
POTASSIUM SERPL-SCNC: 4.1 MMOL/L (ref 3.4–5.3)
SODIUM SERPL-SCNC: 140 MMOL/L (ref 135–145)

## 2023-12-10 PROCEDURE — 80048 BASIC METABOLIC PNL TOTAL CA: CPT | Performed by: STUDENT IN AN ORGANIZED HEALTH CARE EDUCATION/TRAINING PROGRAM

## 2023-12-10 PROCEDURE — G0008 ADMIN INFLUENZA VIRUS VAC: HCPCS | Performed by: STUDENT IN AN ORGANIZED HEALTH CARE EDUCATION/TRAINING PROGRAM

## 2023-12-10 PROCEDURE — 99239 HOSP IP/OBS DSCHRG MGMT >30: CPT | Performed by: STUDENT IN AN ORGANIZED HEALTH CARE EDUCATION/TRAINING PROGRAM

## 2023-12-10 PROCEDURE — 36415 COLL VENOUS BLD VENIPUNCTURE: CPT | Performed by: STUDENT IN AN ORGANIZED HEALTH CARE EDUCATION/TRAINING PROGRAM

## 2023-12-10 PROCEDURE — 90662 IIV NO PRSV INCREASED AG IM: CPT | Performed by: STUDENT IN AN ORGANIZED HEALTH CARE EDUCATION/TRAINING PROGRAM

## 2023-12-10 PROCEDURE — 250N000013 HC RX MED GY IP 250 OP 250 PS 637: Mod: GY | Performed by: STUDENT IN AN ORGANIZED HEALTH CARE EDUCATION/TRAINING PROGRAM

## 2023-12-10 PROCEDURE — 250N000011 HC RX IP 250 OP 636: Performed by: STUDENT IN AN ORGANIZED HEALTH CARE EDUCATION/TRAINING PROGRAM

## 2023-12-10 PROCEDURE — 250N000011 HC RX IP 250 OP 636: Mod: JZ | Performed by: STUDENT IN AN ORGANIZED HEALTH CARE EDUCATION/TRAINING PROGRAM

## 2023-12-10 RX ADMIN — PANTOPRAZOLE SODIUM 40 MG: 40 TABLET, DELAYED RELEASE ORAL at 06:44

## 2023-12-10 RX ADMIN — INFLUENZA A VIRUS A/VICTORIA/4897/2022 IVR-238 (H1N1) ANTIGEN (FORMALDEHYDE INACTIVATED), INFLUENZA A VIRUS A/DARWIN/9/2021 SAN-010 (H3N2) ANTIGEN (FORMALDEHYDE INACTIVATED), INFLUENZA B VIRUS B/PHUKET/3073/2013 ANTIGEN (FORMALDEHYDE INACTIVATED), AND INFLUENZA B VIRUS B/MICHIGAN/01/2021 ANTIGEN (FORMALDEHYDE INACTIVATED) 0.7 ML: 60; 60; 60; 60 INJECTION, SUSPENSION INTRAMUSCULAR at 11:58

## 2023-12-10 RX ADMIN — PRAVASTATIN SODIUM 40 MG: 20 TABLET ORAL at 08:08

## 2023-12-10 RX ADMIN — HEPARIN SODIUM 5000 UNITS: 5000 INJECTION, SOLUTION INTRAVENOUS; SUBCUTANEOUS at 06:44

## 2023-12-10 RX ADMIN — Medication 81 MG: at 08:09

## 2023-12-10 ASSESSMENT — ACTIVITIES OF DAILY LIVING (ADL)
ADLS_ACUITY_SCORE: 24

## 2023-12-10 NOTE — PROGRESS NOTES
Care Management Discharge Note    Discharge Date: 12/10/2023       Discharge Disposition:  Home      Additional Information:  Discharge anticipated. No CM needs identified.    DODIE Arreaga

## 2023-12-10 NOTE — PLAN OF CARE
Problem: Pain Acute  Goal: Optimal Pain Control and Function  Outcome: Progressing  Intervention: Optimize Psychosocial Wellbeing  Recent Flowsheet Documentation  Taken 12/9/2023 1520 by Yahaira Dodd RN  Supportive Measures:   active listening utilized   verbalization of feelings encouraged   Denies pain.  Problem: Nausea and Vomiting  Goal: Nausea and Vomiting Relief  Outcome: Progressing  Intervention: Prevent and Manage Nausea and Vomiting  Recent Flowsheet Documentation  Taken 12/9/2023 1520 by Yahaira Dodd RN  Environmental Support: calm environment promoted   Tolerated full liquid well.  Problem: Constipation  Goal: Effective Bowel Elimination  Outcome: Progressing   Pt had loose stools.  Problem: Risk for Delirium  Goal: Improved Behavioral Control  Outcome: Progressing  Intervention: Minimize Safety Risk  Recent Flowsheet Documentation  Taken 12/9/2023 1520 by Yahaira Dodd RN  Communication Enhancement Strategies: call light answered in person  Pt has been pleasant, alert and coherent.  Problem: Surgery Nonspecified  Goal: Effective Urinary Elimination  Outcome: Progressing   Petersen catheter intact and draining well.

## 2023-12-10 NOTE — PLAN OF CARE
Goal Outcome Evaluation:      Plan of Care Reviewed With: patient    Overall Patient Progress: improvingOverall Patient Progress: improving         Alert and oriented.hard of hearing, uses bilateral hearing aids. Petersen catheter present  and draining.  Independent in the room. Ambulates independently. Diet advanced to regular.  Patient denies pain, nausea/vomiting. JOSE site has ostomy pouch to collect drainage 15 ml output this shift.

## 2023-12-10 NOTE — PLAN OF CARE
Patient discharged to home with son at 12:40 PM.     Flu shot given to patient prior to discharge, per patient request.     AVS printed and medications updated. AVS/Discharge supplies given to patient. All discharge medications, restrictions, and instructions reviewed with patient. Patient verbalized understanding of discharge medications, restrictions, and instructions. Patient discharging with ponce catheter, per previous admission instructions until follow-up with urology completed.     Patient to follow-up with Urology December 14, 2023. Patient instructed to contact urology office or to seek care if experiencing worsening symptoms, such as uncontrolled pain, fevers, and uncontrolled nausea or vomiting.      PIV removed. Education completed. Belongings returned to patient.    ------    Problem: Adult Inpatient Plan of Care  Goal: Plan of Care Review  Outcome: Met

## 2023-12-10 NOTE — DISCHARGE SUMMARY
"[facility]  Hospitalist Discharge Summary      Date of Admission:  12/8/2023  Date of Discharge:  12/10/2023  Discharging Provider: Hayden Pak DO  Discharge Service: Hospitalist Service    Discharge Diagnoses   Post-op ileus     Clinically Significant Risk Factors     # DMII: A1C = 7.5 % (Ref range: <5.7 %) within past 6 months    # Overweight: Estimated body mass index is 26.95 kg/m  as calculated from the following:    Height as of this encounter: 1.727 m (5' 8\").    Weight as of this encounter: 80.4 kg (177 lb 4 oz).       Follow-ups Needed After Discharge   Follow-up Appointments     Follow-up and recommended labs and tests       Follow up with primary care provider, Vicente Tomlin MD, within 7   days for hospital follow- up.  The following labs/tests are recommended:   BMP.            Unresulted Labs Ordered in the Past 30 Days of this Admission       Date and Time Order Name Status Description    12/4/2023  1:44 PM Surgical Pathology Exam In process         These results will be followed up by PCP, urology    Discharge Disposition   Discharged to home  Condition at discharge: Stable    Hospital Course   Agustin Nolan is a 78 year old man who presented to the ER with complaint of abdominal discomfort, nausea, vomiting, lack of bowel movement s/p prostatectomy. Labs were significant for mild hyponatremia, hypochloremia. CT scan of the abdomen was performed which showed small bowel paralytic ileus. No mechanical bowel obstruction. Intraperitoneal free air from recent surgery. Also mentioning possible cystitis and hypo-attenuating hepatic lesions that were incompletely assessed on imaging.     Small bowel ileus s/p prostatectomy    -patient has had return of bowel function and able to tolerate regular diet without issue  -PRN antiemesis meds and PRN pain regimen  -Bowel regimen with BID Dulcolax  summer consulted and signed off  -discharging today     Anion gap acidosis- " resolved   -likely starvation ketosis. Poor oral intake since surgery with UA showing ketones.  -resolved with D5 and increased PO intake     Hepatic lesions on CT  -MRI abdomen showing benign hepatic cysts requiring no follow-up     Cystitis on CT  -patient without urinary sx  -follow-up on UA and cx done on admission, no concern at the moment   -may be from recent prostate surgery and Petersen in place, patient to follow-up with urology as scheduled     Diabetes  -continue home regimen on discharge     Consultations This Hospital Stay   SURGERY GENERAL IP CONSULT    Code Status   Full Code    Time Spent on this Encounter   IHayden DO, personally saw the patient today and spent greater than 30 minutes discharging this patient.       Hayden Pak DO  [address] [address]  Phone: [phone]  Fax: [phone]  ______________________________________________________________________    Physical Exam   Vital Signs: Temp: 98.1  F (36.7  C) Temp src: Oral BP: (!) 155/94 Pulse: 66   Resp: 18 SpO2: 96 % O2 Device: None (Room air)    Weight: 177 lbs 4 oz    General Appearance:  No acute distress  Respiratory: CTAB  Cardiovascular: RRR  GI: normoactive sounds without rebound, guarding or pain       Primary Care Physician   Vicente Tomlin MD    Discharge Orders      Reason for your hospital stay    Post-op ileus     Follow-up and recommended labs and tests     Follow up with primary care provider, Vicente Tomlin MD, within 7 days for hospital follow- up.  The following labs/tests are recommended: BMP.     Activity    Your activity upon discharge: activity as tolerated     Diet    Follow this diet upon discharge: Orders Placed This Encounter      Room Service      Regular Diet Adult       Significant Results and Procedures   Results for orders placed or performed during the hospital encounter of 12/08/23   CT Abdomen Pelvis w/o Contrast     Narrative    EXAM: CT ABDOMEN PELVIS W/O CONTRAST  LOCATION: [facility]  DATE: 12/8/2023    INDICATION: Postoperative vomiting  COMPARISON: 10/24/2023.   TECHNIQUE: CT scan of the abdomen and pelvis was performed without IV contrast. Multiplanar reformats were obtained. Dose reduction techniques were used.  CONTRAST: None.    FINDINGS:   LOWER CHEST: Bronchiectasis. Coronary artery disease.     HEPATOBILIARY: Hepatic steatosis. Multiple hypoattenuating hepatic lesions measuring up to 25 mm.     PANCREAS: Normal.    SPLEEN: Normal.    ADRENAL GLANDS: Normal.    KIDNEYS/BLADDER: Wall thickening of the urinary bladder. The bladder is not decompressed despite presence of a Petersen catheter.     BOWEL: Normal stomach. Multiple loops of small bowel are distended with a maximum diameter of 3.7 cm. Air-fluid levels are present. No transition point. The distal ileum is not distended. No CT evidence of appendicitis. There is colonic diverticulosis   without diverticulitis.     LYMPH NODES: Normal.    VASCULATURE: Mild atherosclerotic disease.     PELVIC ORGANS: Prostatectomy.     OTHER: Small amount of free air in the abdomen. No free fluid.    MUSCULOSKELETAL: Postsurgical changes in the abdominal wall.       Impression    IMPRESSION:   1.  Prostatectomy. Intraperitoneal free air is from recent surgery.   2.  Small bowel paralytic ileus. No mechanical bowel obstruction.  3.  Wall thickening of the urinary bladder could be mild cystitis.  4.  Multiple hypoattenuating hepatic lesions are incompletely assessed on this study. These could be better assessed with liver mass protocol MRI. There is hepatic steatosis.  5.  Bronchiectasis of unclear etiology.     MR Abdomen w/o & w Contrast    Narrative    EXAM: MR ABDOMEN W/O and W CONTRAST  LOCATION: [facility]  DATE: 12/8/2023    INDICATION: hepatic mass protocol for hepatic lesions seen on CT  COMPARISON: CT abdomen/pelvis without  contrast 12/08/2023  TECHNIQUE: Routine MRI abdomen protocol including T1 in/out phase, diffusion, multiplane T2, and dynamic T1 with IV contrast.   CONTRAST: 8ml Gadavist    FINDINGS:    LOWER CHEST: The visualized lung bases are clear.    LIVER: Multiple benign hepatic cysts. Tiny shunt of the peripheral right hepatic lobe. No dedicated follow-up required for these lesions. No suspicious hepatic lesion. Hepatic steatosis.     GALLBLADDER/BILIARY: Unremarkable gallbladder. No biliary duct dilatation.    SPLEEN: Unremarkable.    PANCREAS: Atrophic, otherwise unremarkable.    ADRENAL GLANDS: Unremarkable.    KIDNEYS: Benign renal cysts which require no dedicated follow-up. No hydronephrosis.    BOWEL: Colonic diverticulosis. No significant change in prominent fluid and gas filled loops of small bowel, favoring adynamic ileus in the early postoperative setting.    ASCITES: No significant ascites. Trace postoperative free intraperitoneal gas.    LYMPH NODES: No lymphadenopathy.    VASCULATURE: No abdominal aortic aneurysm.    MUSCULOSKELETAL: No acute osseous abnormality or suspicious osseous lesion. Multilevel degenerative changes of the thoracic and lumbosacral spine.      Impression    IMPRESSION:     1.  Benign hepatic cysts requiring no dedicated follow-up. No suspicious hepatic lesion.  2.  Hepatic steatosis.  3.  No significant change in prominent fluid and gas filled loops of small bowel, favoring adynamic ileus in the early postoperative setting.   XR Gastrografin Challenge    Narrative    EXAM: XR GASTROGRAFIN CHALLENGE  LOCATION: [facility]  DATE: 12/9/2023    INDICATION: Small Bowel Obstruction  COMPARISON: None.  TECHNIQUE: Routine water soluble contrast follow-through challenge.    FINDINGS:    Gas distended small bowel is present in the central abdomen with residual dilute contrast in the distal small bowel. The entire colon, including the rectum contains positive enteric  contrast. There are diverticula which arise from the sigmoid colon.       Impression    IMPRESSION:    1.  Free passage of enteric contrast in the colon which argues against small bowel obstruction.  2.  Persistently distended small bowel loops in the central abdomen. Findings are suggestive of a small bowel ileus.   XR Abdomen Port 1 View    Narrative    EXAM: XR ABDOMEN PORT 1 VIEW  LOCATION: [facility]  DATE: 12/9/2023    INDICATION: Check NG placement  COMPARISON: CT 12/08/2023      Impression    IMPRESSION: Enteric drainage tube tip and sidehole in the stomach. Mildly dilated loops of small bowel again identified. No evidence of free air.       Discharge Medications   Current Discharge Medication List        CONTINUE these medications which have NOT CHANGED    Details   acetaminophen (TYLENOL) 325 MG tablet Take 2 tablets (650 mg) by mouth every 4 hours as needed for other (mild pain)  Qty: 100 tablet, Refills: 0    Associated Diagnoses: Prostate cancer (H)      aspirin (ASA) 81 MG EC tablet Take 81 mg by mouth daily  Qty: 300 tablet, Refills: 1    Associated Diagnoses: Type 2 diabetes mellitus without complication, without long-term current use of insulin (H)      cetirizine (ZYRTEC) 10 MG tablet Take 10 mg by mouth daily      Cholecalciferol (VITAMIN D) 2000 UNITS tablet Take 1 tablet by mouth daily      hydrOXYzine HCl (ATARAX) 10 MG tablet Take 1 tablet (10 mg) by mouth every 6 hours as needed for itching or anxiety (with pain, moderate pain)  Qty: 30 tablet, Refills: 0    Associated Diagnoses: Prostate cancer (H)      metFORMIN (GLUCOPHAGE XR) 500 MG 24 hr tablet Take one tab with breakfast and one tablet with dinner.  Qty: 180 tablet, Refills: 1    Associated Diagnoses: Type 2 diabetes mellitus without complication, without long-term current use of insulin (H)      omeprazole (PRILOSEC) 20 MG DR capsule Take 1 capsule (20 mg) by mouth daily  Qty: 90 capsule, Refills: 1     Associated Diagnoses: Gastroesophageal reflux disease with esophagitis      ondansetron (ZOFRAN ODT) 4 MG ODT tab Take 1-2 tablets (4-8 mg) by mouth every 8 hours as needed for nausea Dissolve ON the tongue.  Qty: 10 tablet, Refills: 0    Associated Diagnoses: Prostate cancer (H)      pravastatin (PRAVACHOL) 40 MG tablet Take 1 tablet (40 mg) by mouth daily  Qty: 90 tablet, Refills: 3    Associated Diagnoses: Hyperlipidemia LDL goal <100      senna-docusate (SENOKOT-S/PERICOLACE) 8.6-50 MG tablet Take 1 tablet by mouth 2 times daily as needed for constipation (and while on narcotic pain medication.)  Qty: 30 tablet, Refills: 0    Associated Diagnoses: Prostate cancer (H)      ACCU-CHEK GUIDE test strip 1 strip by In Vitro route daily To test blood sugar  Qty: 100 strip, Refills: 1    Associated Diagnoses: Diabetes mellitus, type 2 (H)      blood glucose monitoring (ACCU-CHEK JHON PLUS) meter device kit Use to test blood sugar 1 times daily or as directed.  Qty: 1 kit, Refills: 0    Associated Diagnoses: Type 2 diabetes mellitus without complication, without long-term current use of insulin (H)           STOP taking these medications       oxyCODONE (ROXICODONE) 5 MG tablet Comments:   Reason for Stopping:             Allergies   Allergies   Allergen Reactions    Iodine Other (See Comments), Hives and Rash     Hives and all over feels hot    Shellfish-Derived Products Rash    Cats      Hard time breathing, running nose

## 2023-12-11 ENCOUNTER — PATIENT OUTREACH (OUTPATIENT)
Dept: CARE COORDINATION | Facility: CLINIC | Age: 78
End: 2023-12-11
Payer: MEDICARE

## 2023-12-11 DIAGNOSIS — E11.9 DIABETES MELLITUS, TYPE 2 (H): ICD-10-CM

## 2023-12-11 LAB
PATH REPORT.COMMENTS IMP SPEC: ABNORMAL
PATH REPORT.COMMENTS IMP SPEC: ABNORMAL
PATH REPORT.COMMENTS IMP SPEC: YES
PATH REPORT.FINAL DX SPEC: ABNORMAL
PATH REPORT.GROSS SPEC: ABNORMAL
PATH REPORT.MICROSCOPIC SPEC OTHER STN: ABNORMAL
PATH REPORT.RELEVANT HX SPEC: ABNORMAL
PATHOLOGY SYNOPTIC REPORT: ABNORMAL
PHOTO IMAGE: ABNORMAL

## 2023-12-11 PROCEDURE — 88309 TISSUE EXAM BY PATHOLOGIST: CPT | Mod: 26 | Performed by: PATHOLOGY

## 2023-12-11 PROCEDURE — 88307 TISSUE EXAM BY PATHOLOGIST: CPT | Mod: 26 | Performed by: PATHOLOGY

## 2023-12-11 RX ORDER — BLOOD SUGAR DIAGNOSTIC
1 STRIP MISCELLANEOUS DAILY
Qty: 100 STRIP | Refills: 1 | Status: SHIPPED | OUTPATIENT
Start: 2023-12-11 | End: 2024-06-25

## 2023-12-11 NOTE — PROGRESS NOTES
[facility]: [facility]: Post-Discharge Note  SITUATION                                                      Admission:    Admission Date: 12/08/23   Reason for Admission: Post-op ileus  Discharge:   Discharge Date: 12/10/23  Discharge Diagnosis: Post-op ileus    BACKGROUND                                                      Per hospital discharge summary and inpatient provider notes:    Agustin Nolan is a 78 year old male with a past medical history documented below presented to the ER with complaint of Abdominal discomfort, nausea, vomiting, lack of bowel movement s/p prostatectomy.  As per patient he recently had prostatectomy on Monday 12/4/2023, he stated that he was discharged after prostatectomy but has not had a bowel movement since the procedure and started having nausea and vomiting and cannot keep anything down, as patient had multiple episodes of vomiting with minimal food intake, he decided to come to the ER, in the ER initially patient was seen to be tachycardic which resolved, labs were significant for mild hyponatremia, hypochloremia, CT scan of the abdomen was performed which showed. Prostatectomy. Intraperitoneal free air is from recent surgery. Small bowel paralytic ileus. No mechanical bowel obstruction. Wall thickening of the urinary bladder could be mild cystitis. Multiple hypoattenuating hepatic lesions are incompletely assessed on this study. There is hepatic steatosis. Bronchiectasis of unclear etiology.  Patient currently does not have any pulmonary symptoms, in the ER patient received Zofran and famotidine and fluids and is going to be admitted for further evaluation and management small bowel ileus and hepatic lesions.     ASSESSMENT           Discharge Assessment  How are you doing now that you are home?: I am doing much better today.  How are your symptoms? (Red Flag symptoms escalate to triage hotline per guidelines): Improved  Do you feel your condition is  stable enough to be safe at home until your provider visit?: Yes  Does the patient have their discharge instructions? : Yes  Does the patient have questions regarding their discharge instructions? : No  Were you started on any new medications or were there changes to any of your previous medications? : No (Pt did not start any new medications.)  Discharge follow-up appointment scheduled within 14 calendar days? : Yes  Discharge Follow Up Appointment Date: 12/14/23  Discharge Follow Up Appointment Scheduled with?: Specialty Care Provider                PLAN                                                      Outpatient Plan:  Follow up with primary care provider, Vicente Tomlin MD, within 7 days for hospital follow- up. The following labs/  tests are recommended: BMP.    Future Appointments   Date Time Provider Department Center   12/14/2023  1:00 PM Clarissa La APRN CNP [facility]         For any urgent concerns, please contact our 24 hour nurse triage line: [phone] ([phone])         ERIC Muhammad  [phone]  [facility]

## 2023-12-11 NOTE — DISCHARGE SUMMARY
[facility] Discharge Summary: Urology    Agustin Nolan MRN# [id number]   Age: 78 year old YOB: 1945     Date of Admission:  12/4/2023  Date of Discharge::  12/6/2023  1:15 PM  Admitting Physician:  Kamlesh Henry MD  Discharge Physician:  Precious Toro PA-C           Admission Diagnoses:   Prostate cancer (H)          Discharge Diagnosis:     Same          Procedures:    Robotic-assisted laparoscopic radical prostatectomy with bilateral pelvic lymph node dissection.  Robotic assisted lysis of adhesions           Medications Prior to Admission:     No medications prior to admission.             Discharge Medications:     Discharge Medication List as of 12/6/2023 12:31 PM        START taking these medications    Details   acetaminophen (TYLENOL) 325 MG tablet Take 2 tablets (650 mg) by mouth every 4 hours as needed for other (mild pain), Disp-100 tablet, R-0, E-Prescribe      hydrOXYzine HCl (ATARAX) 10 MG tablet Take 1 tablet (10 mg) by mouth every 6 hours as needed for itching or anxiety (with pain, moderate pain), Disp-30 tablet, R-0, E-Prescribe      ondansetron (ZOFRAN ODT) 4 MG ODT tab Take 1-2 tablets (4-8 mg) by mouth every 8 hours as needed for nausea Dissolve ON the tongue., Disp-10 tablet, R-0, E-Prescribe      senna-docusate (SENOKOT-S/PERICOLACE) 8.6-50 MG tablet Take 1 tablet by mouth 2 times daily as needed for constipation (and while on narcotic pain medication.), Disp-30 tablet, R-0, E-Prescribe      oxyCODONE (ROXICODONE) 5 MG tablet Take 1 tablet (5 mg) by mouth every 6 hours as needed for pain, Disp-12 tablet, R-0, E-Prescribe           CONTINUE these medications which have NOT CHANGED    Details   ACCU-CHEK GUIDE test strip 1 strip by In Vitro route daily To test blood sugar, Disp-100 strip, R-1, ANNITA, E-Prescribe      aspirin (ASA) 81 MG EC tablet Take 81 mg by mouth daily, Disp-300 tablet, R-1, Historical      blood glucose monitoring (ACCU-CHEK JHON PLUS) meter  device kit Use to test blood sugar 1 times daily or as directed.Disp-1 kit, [id number]      cetirizine (ZYRTEC) 10 MG tablet Take 10 mg by mouth daily, Historical      Cholecalciferol (VITAMIN D) 2000 UNITS tablet Take 1 tablet by mouth daily, Historical      metFORMIN (GLUCOPHAGE XR) 500 MG 24 hr tablet Take one tab with breakfast and one tablet with dinner., Disp-180 tablet, R-1, E-Prescribe      omeprazole (PRILOSEC) 20 MG DR capsule Take 1 capsule (20 mg) by mouth daily, Disp-90 capsule, R-1, E-Prescribe      pravastatin (PRAVACHOL) 40 MG tablet Take 1 tablet (40 mg) by mouth daily, Disp-90 tablet, R-3, E-Prescribe           STOP taking these medications       ciprofloxacin (CIPRO) 500 MG tablet Comments:   Reason for Stopping:                     Consultations:     None          Hospital Course:     The patient underwent the above procedure and tolerated this well. Uncomplicated post operative course. Had adequate pain control, ambulating and tolerating regular diet at the time of discharge.            Discharge Instructions and Follow-Up:     Discharge diet: Regular   Discharge activity: No lifting >10lbs or strenuous exercise for 6 week(s)   Discharge follow-up: Dr. Henry 12/14/23   Wound care: Ice to area for comfort  Keep wound clean and dry           Discharge Disposition:     Discharged to home        Precious Toro PA-C   [facility] Urology

## 2023-12-11 NOTE — TELEPHONE ENCOUNTER
Accu-chek test strip    Last Office Visit: 11/28/23  Future [facility] Appointments: None  Medication last refilled: 4/7/23 #100 with 1 refill(s)    Prescription approved per [facility] Refill Protocol.    LATOYA DasN, RN, CCM

## 2023-12-14 ENCOUNTER — OFFICE VISIT (OUTPATIENT)
Dept: UROLOGY | Facility: CLINIC | Age: 78
End: 2023-12-14
Payer: MEDICARE

## 2023-12-14 VITALS — OXYGEN SATURATION: 95 % | SYSTOLIC BLOOD PRESSURE: 139 MMHG | HEART RATE: 102 BPM | DIASTOLIC BLOOD PRESSURE: 83 MMHG

## 2023-12-14 DIAGNOSIS — Z85.46 PERSONAL HISTORY OF MALIGNANT NEOPLASM OF PROSTATE: Primary | ICD-10-CM

## 2023-12-14 DIAGNOSIS — Z79.2 PROPHYLACTIC ANTIBIOTIC: ICD-10-CM

## 2023-12-14 PROCEDURE — 99024 POSTOP FOLLOW-UP VISIT: CPT | Performed by: NURSE PRACTITIONER

## 2023-12-14 RX ORDER — CIPROFLOXACIN 500 MG/1
500 TABLET, FILM COATED ORAL ONCE
Qty: 1 TABLET | Refills: 0 | Status: SHIPPED | OUTPATIENT
Start: 2023-12-14 | End: 2023-12-14

## 2023-12-14 NOTE — PROGRESS NOTES
Urology Outpatient Visit    Name: Agustin Nolan    MRN: [id number]   YOB: 1945               Chief Complaint:   S/p prostatectomy         Impression and Plan:   Impression / Plan:   Agustin Nolan is a 78 year old male with hx low grade prostate cancer now s/p RALP wo nerve sparing      -Catheter removed without complication.  -Prophylactic dose of Cipro taken 1 hr prior to presentation.   -Discussed with patient incontinence post-procedure is to be expected.   -Provided with information on performing Kegel exercises. Discussed with patient research showing that 88% of men who performed pelvic floor exercises were completely continent at 3 months following prostatectomy, in contrast to only 56% of men who did not engage in such exercises.  -Follow up post-op 3 mo w Dr. Henry & PSA prior.    Thank you for the opportunity to participate in the care of Agustin Nolan.     DOUGLAS Krueger, CNP  [facility] - Department of Urology  [phone]          History of Present Illness:   Agustin Nolan is 78 year old with Hx of low-grade prostate cancer, rising PSA and enlarging of a lesion seen on MRI of the prostate.  He decided to undergo a robotic prostatectomy, now s/p RALP without nerve sparing performed by Dr. Henry on 12/4. Patient recovered without any postoperative complications other than ileus which resolved w NG tube. Was discharged with Petersen catheter in place. Presents today for catheter removal post procedure. He reports he tolerated the catheter well overall.     History is obtained from patient & EMR          Past Medical History:     Past Medical History:   Diagnosis Date    Esophageal reflux             Past Surgical History:     Past Surgical History:   Procedure Laterality Date    APPENDECTOMY      cystoscopy with pyeloscopy with removal of calculus.      DAVINCI LYSIS OF ADHESIONS N/A 12/4/2023    Procedure: Davinci Lysis of Adhesions;  Surgeon: Kamlesh Henry MD;   Location: [facility] OR    DAVINCI PROSTATECTOMY, LYMPHADENECTOMY Bilateral 12/4/2023    Procedure: PROSTATECTOMY, ROBOT-ASSISTED, USING DA AIDA XI, WITH PELVIC LYMPHADENECTOMY, LYSIS OF ADHESIONS;  Surgeon: Kamlesh Henry MD;  Location: [facility] OR    PROSTATE SURGERY      ROTATOR CUFF REPAIR RT/LT      surgery testis exploration of undescended.  1985    TESTICLE SURGERY      VASECTOMY              Social History:     Social History     Tobacco Use    Smoking status: Never    Smokeless tobacco: Never   Substance Use Topics    Alcohol use: Yes     Alcohol/week: 1.0 standard drink of alcohol     Types: 1 Standard drinks or equivalent per week     Comment: occasional            Family History:   No family history on file.         Allergies:     Allergies   Allergen Reactions    Iodine Other (See Comments), Hives and Rash     Hives and all over feels hot    Shellfish-Derived Products Rash    Cats      Hard time breathing, running nose            Medications:     Current Outpatient Medications   Medication Sig    ACCU-CHEK GUIDE test strip 1 strip by In Vitro route daily To test blood sugar    aspirin (ASA) 81 MG EC tablet Take 81 mg by mouth daily    blood glucose monitoring (ACCU-CHEK JHON PLUS) meter device kit Use to test blood sugar 1 times daily or as directed.    cetirizine (ZYRTEC) 10 MG tablet Take 10 mg by mouth daily    Cholecalciferol (VITAMIN D) 2000 UNITS tablet Take 1 tablet by mouth daily    metFORMIN (GLUCOPHAGE XR) 500 MG 24 hr tablet Take one tab with breakfast and one tablet with dinner.    omeprazole (PRILOSEC) 20 MG DR capsule Take 1 capsule (20 mg) by mouth daily    pravastatin (PRAVACHOL) 40 MG tablet Take 1 tablet (40 mg) by mouth daily    senna-docusate (SENOKOT-S/PERICOLACE) 8.6-50 MG tablet Take 1 tablet by mouth 2 times daily as needed for constipation (and while on narcotic pain medication.)    acetaminophen (TYLENOL) 325 MG tablet Take 2 tablets (650 mg) by mouth every 4 hours as needed for  other (mild pain)    hydrOXYzine HCl (ATARAX) 10 MG tablet Take 1 tablet (10 mg) by mouth every 6 hours as needed for itching or anxiety (with pain, moderate pain)    ondansetron (ZOFRAN ODT) 4 MG ODT tab Take 1-2 tablets (4-8 mg) by mouth every 8 hours as needed for nausea Dissolve ON the tongue.     No current facility-administered medications for this visit.             Review of Systems:    ROS: See HPI for pertinent details.  Remainder of 10-point ROS negative.         Physical Exam:   VS:  T: Data Unavailable    HR: 102    BP: 139/83    RR: Data Unavailable     GEN:  Alert.  NAD. Pt is not diaphoretic.   HEENT:  Sclerae anicteric.    CV:  No obvious jugular venous distension  LUNGS: No respiratory distress, breathing comfortably wo accessory muscle use.  ABD:  ND.        EXT:  Warm, well perfused.  SKIN:  Warm.  Dry.   NEURO:  CN grossly intact.           Data:   All laboratory data reviewed:    Lab Results   Component Value Date    PSA 10.80 09/28/2023    PSA 8.80 05/25/2023    PSA 8.40 04/20/2023    PSA 9.20 03/09/2023    PSA 6.60 09/08/2022    PSA 6.97 04/27/2022    PSA 6.70 02/10/2022    PSA 6.20 08/10/2021    PSA 5.10 03/15/2018    PSA 4.45 01/24/2017    PSA 3.31 01/21/2016    PSA 3.14 09/03/2014    PSA 2.99 08/23/2013    PSA 3.67 03/08/2012    PSA 1.76 08/31/2010    PSA 1.56 10/26/2009    PSA 1.60 10/13/2008    PSA 2.06 09/14/2007     Lab Results   Component Value Date    CR 0.89 12/10/2023    CR 0.91 12/09/2023    CR 0.94 12/08/2023    CR 0.88 12/05/2023    CR 1.04 11/28/2023    CR 1.02 05/02/2023    CR 1.00 04/27/2022    CR 1.13 04/16/2021    CR 1.1 07/14/2020    CR 1.01 03/19/2019    CR 0.98 03/15/2018    CR 1.2 01/24/2017    CR 1.1 01/21/2016    CR 1.21 09/14/2007     Lab Results   Component Value Date    GFRESTIMATED 89 12/10/2023    GFRESTIMATED 87 12/09/2023    GFRESTIMATED 84 12/08/2023    GFRESTIMATED 89 12/05/2023    GFRESTIMATED 74 11/28/2023    GFRESTIMATED 77 05/02/2023    GFRESTIMATED 79  04/27/2022    GFRESTIMATED 64 04/16/2021    GFRESTIMATED 74 03/19/2019    GFRESTIMATED 76 03/15/2018    GFRESTIMATED 65 09/14/2007     Color Urine (no units)   Date Value   12/08/2023 Yellow   04/26/2018 Yellow     Appearance Urine (no units)   Date Value   12/08/2023 Clear   04/26/2018 Clear     Glucose Urine (mg/dL)   Date Value   12/08/2023 70 (A)   04/26/2018 Negative     Bilirubin Urine (no units)   Date Value   12/08/2023 Negative   04/26/2018 Negative     Ketones Urine (mg/dL)   Date Value   12/08/2023 60 (A)   04/26/2018 Negative     Specific Gravity Urine (no units)   Date Value   12/08/2023 1.032 (H)   04/26/2018 1.020     pH Urine   Date Value   12/08/2023 5.5   04/26/2018 6.0 pH     Protein Albumin Urine (mg/dL)   Date Value   12/08/2023 30 (A)   04/26/2018 Negative     Urobilinogen Urine   Date Value   04/20/2023 0.2 E.U./dL   04/26/2018 0.2 EU/dL     Nitrite Urine (no units)   Date Value   12/08/2023 Negative   04/26/2018 Negative     Leukocyte Esterase Urine (no units)   Date Value   12/08/2023 250 Morgan/uL (A)   04/26/2018 Negative

## 2023-12-14 NOTE — LETTER
12/14/2023       RE: Agustin Nolan  [address] [facility] [address]     Dear Colleague,    Thank you for referring your patient, Agustin Nolan, to the [facility] at [facility]. Please see a copy of my visit note below.      Urology Outpatient Visit    Name: Agustin Nolan    MRN: [id number]   YOB: 1945               Chief Complaint:   S/p prostatectomy         Impression and Plan:   Impression / Plan:   Agustin Nolan is a 78 year old male with hx low grade prostate cancer now s/p RALP wo nerve sparing      -Catheter removed without complication.  -Prophylactic dose of Cipro taken 1 hr prior to presentation.   -Discussed with patient incontinence post-procedure is to be expected.   -Provided with information on performing Kegel exercises. Discussed with patient research showing that 88% of men who performed pelvic floor exercises were completely continent at 3 months following prostatectomy, in contrast to only 56% of men who did not engage in such exercises.  -Follow up post-op 3 mo w Dr. Henry & PSA prior.    Thank you for the opportunity to participate in the care of Agustin Nolan.     DOUGLAS Krueger, CNP   [facility] - Department of Urology  [phone]          History of Present Illness:   Agustin Nolan is 78 year old with Hx of low-grade prostate cancer, rising PSA and enlarging of a lesion seen on MRI of the prostate.  He decided to undergo a robotic prostatectomy, now s/p RALP without nerve sparing performed by Dr. Henry on 12/4. Patient recovered without any postoperative complications other than ileus which resolved w NG tube. Was discharged with Petersen catheter in place. Presents today for catheter removal post procedure. He reports he tolerated the catheter well overall.     History is obtained from patient & EMR          Past Medical History:     Past Medical History:   Diagnosis Date    Esophageal  reflux             Past Surgical History:     Past Surgical History:   Procedure Laterality Date    APPENDECTOMY      cystoscopy with pyeloscopy with removal of calculus.      DAVINCI LYSIS OF ADHESIONS N/A 12/4/2023    Procedure: Davinci Lysis of Adhesions;  Surgeon: Kamlesh Henry MD;  Location: [facility] OR    DAVINCI PROSTATECTOMY, LYMPHADENECTOMY Bilateral 12/4/2023    Procedure: PROSTATECTOMY, ROBOT-ASSISTED, USING DA AIDA XI, WITH PELVIC LYMPHADENECTOMY, LYSIS OF ADHESIONS;  Surgeon: Kamlesh Henry MD;  Location: [facility] OR    PROSTATE SURGERY      ROTATOR CUFF REPAIR RT/LT      surgery testis exploration of undescended.  1985    TESTICLE SURGERY      VASECTOMY              Social History:     Social History     Tobacco Use    Smoking status: Never    Smokeless tobacco: Never   Substance Use Topics    Alcohol use: Yes     Alcohol/week: 1.0 standard drink of alcohol     Types: 1 Standard drinks or equivalent per week     Comment: occasional            Family History:   No family history on file.         Allergies:     Allergies   Allergen Reactions    Iodine Other (See Comments), Hives and Rash     Hives and all over feels hot    Shellfish-Derived Products Rash    Cats      Hard time breathing, running nose            Medications:     Current Outpatient Medications   Medication Sig    ACCU-CHEK GUIDE test strip 1 strip by In Vitro route daily To test blood sugar    aspirin (ASA) 81 MG EC tablet Take 81 mg by mouth daily    blood glucose monitoring (ACCU-CHEK JHON PLUS) meter device kit Use to test blood sugar 1 times daily or as directed.    cetirizine (ZYRTEC) 10 MG tablet Take 10 mg by mouth daily    Cholecalciferol (VITAMIN D) 2000 UNITS tablet Take 1 tablet by mouth daily    metFORMIN (GLUCOPHAGE XR) 500 MG 24 hr tablet Take one tab with breakfast and one tablet with dinner.    omeprazole (PRILOSEC) 20 MG DR capsule Take 1 capsule (20 mg) by mouth daily    pravastatin (PRAVACHOL) 40 MG tablet Take  1 tablet (40 mg) by mouth daily    senna-docusate (SENOKOT-S/PERICOLACE) 8.6-50 MG tablet Take 1 tablet by mouth 2 times daily as needed for constipation (and while on narcotic pain medication.)    acetaminophen (TYLENOL) 325 MG tablet Take 2 tablets (650 mg) by mouth every 4 hours as needed for other (mild pain)    hydrOXYzine HCl (ATARAX) 10 MG tablet Take 1 tablet (10 mg) by mouth every 6 hours as needed for itching or anxiety (with pain, moderate pain)    ondansetron (ZOFRAN ODT) 4 MG ODT tab Take 1-2 tablets (4-8 mg) by mouth every 8 hours as needed for nausea Dissolve ON the tongue.     No current facility-administered medications for this visit.             Review of Systems:    ROS: See HPI for pertinent details.  Remainder of 10-point ROS negative.         Physical Exam:   VS:  T: Data Unavailable    HR: 102    BP: 139/83    RR: Data Unavailable     GEN:  Alert.  NAD. Pt is not diaphoretic.   HEENT:  Sclerae anicteric.    CV:  No obvious jugular venous distension  LUNGS: No respiratory distress, breathing comfortably wo accessory muscle use.  ABD:  ND.        EXT:  Warm, well perfused.  SKIN:  Warm.  Dry.   NEURO:  CN grossly intact.           Data:   All laboratory data reviewed:    Lab Results   Component Value Date    PSA 10.80 09/28/2023    PSA 8.80 05/25/2023    PSA 8.40 04/20/2023    PSA 9.20 03/09/2023    PSA 6.60 09/08/2022    PSA 6.97 04/27/2022    PSA 6.70 02/10/2022    PSA 6.20 08/10/2021    PSA 5.10 03/15/2018    PSA 4.45 01/24/2017    PSA 3.31 01/21/2016    PSA 3.14 09/03/2014    PSA 2.99 08/23/2013    PSA 3.67 03/08/2012    PSA 1.76 08/31/2010    PSA 1.56 10/26/2009    PSA 1.60 10/13/2008    PSA 2.06 09/14/2007     Lab Results   Component Value Date    CR 0.89 12/10/2023    CR 0.91 12/09/2023    CR 0.94 12/08/2023    CR 0.88 12/05/2023    CR 1.04 11/28/2023    CR 1.02 05/02/2023    CR 1.00 04/27/2022    CR 1.13 04/16/2021    CR 1.1 07/14/2020    CR 1.01 03/19/2019    CR 0.98 03/15/2018     CR 1.2 01/24/2017    CR 1.1 01/21/2016    CR 1.21 09/14/2007     Lab Results   Component Value Date    GFRESTIMATED 89 12/10/2023    GFRESTIMATED 87 12/09/2023    GFRESTIMATED 84 12/08/2023    GFRESTIMATED 89 12/05/2023    GFRESTIMATED 74 11/28/2023    GFRESTIMATED 77 05/02/2023    GFRESTIMATED 79 04/27/2022    GFRESTIMATED 64 04/16/2021    GFRESTIMATED 74 03/19/2019    GFRESTIMATED 76 03/15/2018    GFRESTIMATED 65 09/14/2007     Color Urine (no units)   Date Value   12/08/2023 Yellow   04/26/2018 Yellow     Appearance Urine (no units)   Date Value   12/08/2023 Clear   04/26/2018 Clear     Glucose Urine (mg/dL)   Date Value   12/08/2023 70 (A)   04/26/2018 Negative     Bilirubin Urine (no units)   Date Value   12/08/2023 Negative   04/26/2018 Negative     Ketones Urine (mg/dL)   Date Value   12/08/2023 60 (A)   04/26/2018 Negative     Specific Gravity Urine (no units)   Date Value   12/08/2023 1.032 (H)   04/26/2018 1.020     pH Urine   Date Value   12/08/2023 5.5   04/26/2018 6.0 pH     Protein Albumin Urine (mg/dL)   Date Value   12/08/2023 30 (A)   04/26/2018 Negative     Urobilinogen Urine   Date Value   04/20/2023 0.2 E.U./dL   04/26/2018 0.2 EU/dL     Nitrite Urine (no units)   Date Value   12/08/2023 Negative   04/26/2018 Negative     Leukocyte Esterase Urine (no units)   Date Value   12/08/2023 250 Morgan/uL (A)   04/26/2018 Negative

## 2023-12-15 NOTE — UTILIZATION REVIEW
Medicare Post-Discharge Review      Admission Status; Secondary Review Determination       As part of the [place] Utilization review plan, a self-audit is done on Medicare inpatient admission with less than 2 midnights stay. The 2014 IPPS Final Rule allows outpatient billing in the event that a hospital determines that an inpatient admission was not medically necessary under utilization review process.      (x) Outpatient status would be Appropriate- Short Stay- Post discharge review.     RATIONALE FOR DETERMINATION   Agustin Nolan is a 78 yr old male who presented on 12/8/23 on POD#4 from prostatectomy with ileus.  He was treated with IVF, NPO, NG and gastrograffin SBFT.  He had resolution of his N/V and started clear liquid diet on 12/9/23.  He was advancing his diet with no further episodes of pain or N/V.  Surgery cleared patient for discharge if tolerating PO intake.  He was advanced to full liquid diet by afternoon 12/9 with no further needing IVF or antiemetics.  He remained overnight one more night to observe the slow advance if diet but no other acute medical needs.    Patient was admitted and discharge after one night stay. Record was sent by  for  Medicare short stay review by Medical Director. Based on the  severity of illness, intensity of service provided, expected LOS and risk for adverse outcome make the care appropriate for further outpatient/observation; however, doesn't meet criteria for hospital inpatient admission.           The information on this document is developed by the utilization review team in order for the business office to ensure compliance.  This only denotes the appropriateness of proper admission status and does not reflect the quality of care rendered.         The definitions of Inpatient Status and Observation Status used in making the determination above are those provided in the CMS Coverage Manual, Chapter 1 and Chapter 6, section 70.4.      Sincerely,     Priti HENDRICKSON  MD Jd  Utilization Review  Medical Director  [facility].

## 2024-01-04 ENCOUNTER — TELEPHONE (OUTPATIENT)
Dept: UROLOGY | Facility: CLINIC | Age: 79
End: 2024-01-04
Payer: MEDICARE

## 2024-01-04 NOTE — TELEPHONE ENCOUNTER
[facility] Call Center    Phone Message    May a detailed message be left on voicemail: no     Reason for Call: Other: pt going to dentist today and wants to know if he needs to take any antibotics before he goes to the dentist.had prostate removed, please advise     Action Taken: Other: urology    Travel Screening: Not Applicable

## 2024-01-22 ENCOUNTER — HOSPITAL ENCOUNTER (EMERGENCY)
Facility: HOSPITAL | Age: 79
Discharge: HOME | End: 2024-01-22
Attending: EMERGENCY MEDICINE | Admitting: EMERGENCY MEDICINE
Payer: MEDICARE

## 2024-01-22 VITALS
OXYGEN SATURATION: 96 % | TEMPERATURE: 97.8 F | HEART RATE: 105 BPM | DIASTOLIC BLOOD PRESSURE: 93 MMHG | WEIGHT: 173 LBS | BODY MASS INDEX: 25.62 KG/M2 | RESPIRATION RATE: 18 BRPM | SYSTOLIC BLOOD PRESSURE: 161 MMHG | HEIGHT: 69 IN

## 2024-01-22 DIAGNOSIS — N30.00 ACUTE CYSTITIS WITHOUT HEMATURIA: ICD-10-CM

## 2024-01-22 DIAGNOSIS — M54.50 ACUTE LEFT-SIDED LOW BACK PAIN WITHOUT SCIATICA: ICD-10-CM

## 2024-01-22 LAB
ALBUMIN SERPL BCG-MCNC: 4.7 G/DL (ref 3.5–5.2)
ALBUMIN UR-MCNC: 100 MG/DL
ALP SERPL-CCNC: 86 U/L (ref 40–150)
ALT SERPL W P-5'-P-CCNC: 26 U/L (ref 0–70)
ANION GAP SERPL CALCULATED.3IONS-SCNC: 14 MMOL/L (ref 7–15)
APPEARANCE UR: CLEAR
AST SERPL W P-5'-P-CCNC: 22 U/L (ref 0–45)
BACTERIA #/AREA URNS HPF: ABNORMAL /HPF
BASOPHILS # BLD AUTO: 0.1 10E3/UL (ref 0–0.2)
BASOPHILS NFR BLD AUTO: 1 %
BILIRUB SERPL-MCNC: 0.6 MG/DL
BILIRUB UR QL STRIP: NEGATIVE
BUN SERPL-MCNC: 17.1 MG/DL (ref 8–23)
CALCIUM SERPL-MCNC: 9.6 MG/DL (ref 8.8–10.2)
CHLORIDE SERPL-SCNC: 99 MMOL/L (ref 98–107)
COLOR UR AUTO: YELLOW
CREAT SERPL-MCNC: 0.97 MG/DL (ref 0.67–1.17)
CRP SERPL-MCNC: 5.2 MG/L
DEPRECATED HCO3 PLAS-SCNC: 24 MMOL/L (ref 22–29)
EGFRCR SERPLBLD CKD-EPI 2021: 81 ML/MIN/1.73M2
EOSINOPHIL # BLD AUTO: 0.1 10E3/UL (ref 0–0.7)
EOSINOPHIL NFR BLD AUTO: 1 %
ERYTHROCYTE [DISTWIDTH] IN BLOOD BY AUTOMATED COUNT: 13.7 % (ref 10–15)
GLUCOSE SERPL-MCNC: 176 MG/DL (ref 70–99)
GLUCOSE UR STRIP-MCNC: NEGATIVE MG/DL
HCT VFR BLD AUTO: 47 % (ref 40–53)
HGB BLD-MCNC: 15.2 G/DL (ref 13.3–17.7)
HGB UR QL STRIP: NEGATIVE
HYALINE CASTS: 3 /LPF
IMM GRANULOCYTES # BLD: 0 10E3/UL
IMM GRANULOCYTES NFR BLD: 0 %
KETONES UR STRIP-MCNC: ABNORMAL MG/DL
LEUKOCYTE ESTERASE UR QL STRIP: ABNORMAL
LYMPHOCYTES # BLD AUTO: 1.3 10E3/UL (ref 0.8–5.3)
LYMPHOCYTES NFR BLD AUTO: 13 %
MCH RBC QN AUTO: 29.1 PG (ref 26.5–33)
MCHC RBC AUTO-ENTMCNC: 32.3 G/DL (ref 31.5–36.5)
MCV RBC AUTO: 90 FL (ref 78–100)
MONOCYTES # BLD AUTO: 0.4 10E3/UL (ref 0–1.3)
MONOCYTES NFR BLD AUTO: 4 %
MUCOUS THREADS #/AREA URNS LPF: PRESENT /LPF
NEUTROPHILS # BLD AUTO: 8.5 10E3/UL (ref 1.6–8.3)
NEUTROPHILS NFR BLD AUTO: 81 %
NITRATE UR QL: NEGATIVE
NRBC # BLD AUTO: 0 10E3/UL
NRBC BLD AUTO-RTO: 0 /100
PH UR STRIP: 5 [PH] (ref 5–7)
PLATELET # BLD AUTO: 346 10E3/UL (ref 150–450)
POTASSIUM SERPL-SCNC: 4.4 MMOL/L (ref 3.4–5.3)
PROT SERPL-MCNC: 8.4 G/DL (ref 6.4–8.3)
RBC # BLD AUTO: 5.23 10E6/UL (ref 4.4–5.9)
RBC URINE: 10 /HPF
SODIUM SERPL-SCNC: 137 MMOL/L (ref 135–145)
SP GR UR STRIP: 1.03 (ref 1–1.03)
UROBILINOGEN UR STRIP-MCNC: <2 MG/DL
WBC # BLD AUTO: 10.4 10E3/UL (ref 4–11)
WBC URINE: 23 /HPF

## 2024-01-22 PROCEDURE — 36415 COLL VENOUS BLD VENIPUNCTURE: CPT | Performed by: EMERGENCY MEDICINE

## 2024-01-22 PROCEDURE — 80053 COMPREHEN METABOLIC PANEL: CPT | Performed by: EMERGENCY MEDICINE

## 2024-01-22 PROCEDURE — 99284 EMERGENCY DEPT VISIT MOD MDM: CPT

## 2024-01-22 PROCEDURE — 250N000013 HC RX MED GY IP 250 OP 250 PS 637: Performed by: EMERGENCY MEDICINE

## 2024-01-22 PROCEDURE — 87086 URINE CULTURE/COLONY COUNT: CPT | Performed by: EMERGENCY MEDICINE

## 2024-01-22 PROCEDURE — 86140 C-REACTIVE PROTEIN: CPT | Performed by: EMERGENCY MEDICINE

## 2024-01-22 PROCEDURE — 81001 URINALYSIS AUTO W/SCOPE: CPT | Performed by: EMERGENCY MEDICINE

## 2024-01-22 PROCEDURE — 85025 COMPLETE CBC W/AUTO DIFF WBC: CPT | Performed by: EMERGENCY MEDICINE

## 2024-01-22 RX ORDER — CEFDINIR 300 MG/1
300 CAPSULE ORAL ONCE
Status: COMPLETED | OUTPATIENT
Start: 2024-01-22 | End: 2024-01-22

## 2024-01-22 RX ORDER — HYDROCODONE BITARTRATE AND ACETAMINOPHEN 5; 325 MG/1; MG/1
1 TABLET ORAL EVERY 6 HOURS PRN
Qty: 10 TABLET | Refills: 0 | Status: SHIPPED | OUTPATIENT
Start: 2024-01-22 | End: 2024-01-25

## 2024-01-22 RX ORDER — CEFDINIR 300 MG/1
300 CAPSULE ORAL 2 TIMES DAILY
Qty: 14 CAPSULE | Refills: 0 | Status: SHIPPED | OUTPATIENT
Start: 2024-01-22 | End: 2024-06-25

## 2024-01-22 RX ADMIN — CEFDINIR 300 MG: 300 CAPSULE ORAL at 17:02

## 2024-01-22 ASSESSMENT — ACTIVITIES OF DAILY LIVING (ADL): ADLS_ACUITY_SCORE: 35

## 2024-01-22 NOTE — ED TRIAGE NOTES
Patient right flank pain for few days now, HX: kidney stone     Triage Assessment (Adult)       Row Name 01/22/24 6612          Triage Assessment    Airway WDL WDL        Respiratory WDL    Respiratory WDL WDL        Skin Circulation/Temperature WDL    Skin Circulation/Temperature WDL WDL        Cardiac WDL    Cardiac WDL WDL        Peripheral/Neurovascular WDL    Peripheral Neurovascular WDL WDL        Cognitive/Neuro/Behavioral WDL    Cognitive/Neuro/Behavioral WDL WDL

## 2024-01-22 NOTE — ED PROVIDER NOTES
EMERGENCY DEPARTMENT ENCOUNTER      NAME: Agustin Nolan  AGE: 78 year old male  YOB: 1945  MRN: [id number]  EVALUATION DATE & TIME: No admission date for patient encounter.    PCP: Vicente Tomlin    ED PROVIDER: Thad Burgos M.D.      Chief Complaint   Patient presents with    Flank Pain         FINAL IMPRESSION:  UTI  Low back pain      ED COURSE & MEDICAL DECISION MAKING:    Pertinent Labs & Imaging studies reviewed. (See chart for details)  78 year old male presents to the Emergency Department for evaluation of left-sided back pain.  Describes it as an hot achiness.  Onset while he was loading his mother's wheelchair.  Patient did have some slight back pain earlier in the week.  He wore his back belt for day and symptoms improved.  Has not taken any for discomfort.  Denies any urinary symptoms.  No nausea or vomiting.  On exam he is a well-nourished follow-up adult male in mild distress.  There is no midline thoracolumbar tenderness.  No CVA tenderness.  Abdomen is soft and nontender.  Some mild left lumbar paraspinal tenderness.  Review of records indicate patient with recent robotic prostatectomy on 12/4/2023.  Patient hospitalized on 12/8/2023 with postoperative ileus.  Exam suggest musculoskeletal discomfort however with recent operative procedure and the risk of infectious process baseline blood will be obtained including inflammatory markers.  Presently no indications for imaging.  Urine analysis obtained for potential atypical kidney stone but this seems unlikely.  No indications for advanced imaging given patient's presentation and absence of neurologic findings.. Patient appears non toxic with stable vitals signs. Overall exam is benign.    3:14 PM  I met with the patient for the initial interview and physical examination. Discussed plan for treatment and workup in the ED.    4:03 PM.  CBC is unremarkable.  White cell count normal at 10.4.  Comprehensive metabolic panel normal.  No  "evidence of liver dysfunction.  Glucose minimally elevated 176 but noncontributory.  Urine analysis with suggestion of infection.  Patient with pyuria and a few bacteria.  Minimal blood.  This could be postoperative.  Awaiting C-reactive protein.  4:53 PM.  C-reactive protein only mildly elevated.  Will proceed with antibiotics for urinary tract infection.  Omnicef given here.  Will also provide outpatient hydrocodone for low back pain.  Appears to be musculoskeletal.  Routine return precautions given.  Patient now with his daughter.  She is agreeable with plan.  At the conclusion of the encounter I discussed the results of all of the tests and the disposition. The questions were answered and return precautions provided. The patient or family acknowledged understanding and was agreeable with the care plan.     PPE: Provider wore paper mask    MEDICATIONS GIVEN IN THE EMERGENCY:  Medications - No data to display    NEW PRESCRIPTIONS STARTED AT TODAY'S ER VISIT  Current Discharge Medication List        START taking these medications    Details   cefdinir (OMNICEF) 300 MG capsule Take 1 capsule (300 mg) by mouth 2 times daily  Qty: 14 capsule, Refills: 0      HYDROcodone-acetaminophen (NORCO) 5-325 MG tablet Take 1 tablet by mouth every 6 hours as needed  Qty: 10 tablet, Refills: 0                 =================================================================    HPI      Agustin Nolan is a 78 year old male with a pertient medical history of hyperlipidemia, diabetes mellitus, prostate cancer who presents to the ED for evaluation of flank pain.  Patient reports a sudden burning discomfort in his left lower back while lifting his mother's wheelchair.  Patient had been having some slight back pain in the middle of the week but used his back belt and symptoms improved until today.  He said it felt like \"almost liquid\" when he had the sudden onset of pain.  Occasionally gets similar twinges.  Denies any prior similar " "episodes.  Denies any urinary symptoms at than a tiny amount of blood with urination yesterday after \"removing my clamp\".  Patient with recent robotic prostatectomy      REVIEW OF SYSTEMS   Constitutional:  Denies fever, chills  Respiratory:  Denies productive cough or increased work of breathing  Cardiovascular:  Denies chest pain, palpitations  GI:  Denies abdominal pain, nausea, vomiting, or change in bowel or bladder habits   Musculoskeletal:  Denies any new muscle/joint swelling  Skin:  Denies rash   Neurologic:  Denies focal weakness  All systems negative except as marked.     PAST MEDICAL HISTORY:  Past Medical History:   Diagnosis Date    Esophageal reflux     Medicare annual wellness visit, subsequent 03/25/2018       PAST SURGICAL HISTORY:  Past Surgical History:   Procedure Laterality Date    APPENDECTOMY      cystoscopy with pyeloscopy with removal of calculus.      DAVINCI LYSIS OF ADHESIONS N/A 12/4/2023    Procedure: Davinci Lysis of Adhesions;  Surgeon: Kamlesh Henry MD;  Location: [facility] OR    DAVINCI PROSTATECTOMY, LYMPHADENECTOMY Bilateral 12/4/2023    Procedure: PROSTATECTOMY, ROBOT-ASSISTED, USING DA AIDA XI, WITH PELVIC LYMPHADENECTOMY, LYSIS OF ADHESIONS;  Surgeon: Kamlesh Henry MD;  Location: [facility] OR    PROSTATE SURGERY      ROTATOR CUFF REPAIR RT/LT      surgery testis exploration of undescended.  1985    TESTICLE SURGERY      VASECTOMY           CURRENT MEDICATIONS:    No current facility-administered medications for this encounter.    Current Outpatient Medications:     ACCU-CHEK GUIDE test strip, 1 strip by In Vitro route daily To test blood sugar, Disp: 100 strip, Rfl: 1    acetaminophen (TYLENOL) 325 MG tablet, Take 2 tablets (650 mg) by mouth every 4 hours as needed for other (mild pain), Disp: 100 tablet, Rfl: 0    aspirin (ASA) 81 MG EC tablet, Take 81 mg by mouth daily, Disp: 300 tablet, Rfl: 1    blood glucose monitoring (ACCU-CHEK JHON PLUS) meter device kit, Use " to test blood sugar 1 times daily or as directed., Disp: 1 kit, Rfl: 0    cetirizine (ZYRTEC) 10 MG tablet, Take 10 mg by mouth daily, Disp: , Rfl:     Cholecalciferol (VITAMIN D) 2000 UNITS tablet, Take 1 tablet by mouth daily, Disp: , Rfl:     hydrOXYzine HCl (ATARAX) 10 MG tablet, Take 1 tablet (10 mg) by mouth every 6 hours as needed for itching or anxiety (with pain, moderate pain), Disp: 30 tablet, Rfl: 0    metFORMIN (GLUCOPHAGE XR) 500 MG 24 hr tablet, Take one tab with breakfast and one tablet with dinner., Disp: 180 tablet, Rfl: 1    omeprazole (PRILOSEC) 20 MG DR capsule, Take 1 capsule (20 mg) by mouth daily, Disp: 90 capsule, Rfl: 1    ondansetron (ZOFRAN ODT) 4 MG ODT tab, Take 1-2 tablets (4-8 mg) by mouth every 8 hours as needed for nausea Dissolve ON the tongue., Disp: 10 tablet, Rfl: 0    pravastatin (PRAVACHOL) 40 MG tablet, Take 1 tablet (40 mg) by mouth daily, Disp: 90 tablet, Rfl: 3    senna-docusate (SENOKOT-S/PERICOLACE) 8.6-50 MG tablet, Take 1 tablet by mouth 2 times daily as needed for constipation (and while on narcotic pain medication.), Disp: 30 tablet, Rfl: 0    ALLERGIES:  Allergies   Allergen Reactions    Iodine Other (See Comments), Hives and Rash     Hives and all over feels hot    Shellfish-Derived Products Rash    Cats      Hard time breathing, running nose       FAMILY HISTORY:  No family history on file.    SOCIAL HISTORY:   Social History     Socioeconomic History    Marital status:    Tobacco Use    Smoking status: Never    Smokeless tobacco: Never   Substance and Sexual Activity    Alcohol use: Yes     Alcohol/week: 1.0 standard drink of alcohol     Types: 1 Standard drinks or equivalent per week     Comment: occasional    Drug use: No    Sexual activity: Never     Social Determinants of Health     Financial Resource Strain: Low Risk  (11/21/2023)    Financial Resource Strain     Within the past 12 months, have you or your family members you live with been unable to  "get utilities (heat, electricity) when it was really needed?: No   Food Insecurity: Low Risk  (11/21/2023)    Food Insecurity     Within the past 12 months, did you worry that your food would run out before you got money to buy more?: No     Within the past 12 months, did the food you bought just not last and you didn t have money to get more?: No   Transportation Needs: Low Risk  (11/21/2023)    Transportation Needs     Within the past 12 months, has lack of transportation kept you from medical appointments, getting your medicines, non-medical meetings or appointments, work, or from getting things that you need?: No   Interpersonal Safety: Low Risk  (11/28/2023)    Interpersonal Safety     Do you feel physically and emotionally safe where you currently live?: Yes     Within the past 12 months, have you been hit, slapped, kicked or otherwise physically hurt by someone?: Patient refused     Within the past 12 months, have you been humiliated or emotionally abused in other ways by your partner or ex-partner?: Patient refused   Housing Stability: Low Risk  (11/21/2023)    Housing Stability     Do you have housing? : Yes     Are you worried about losing your housing?: No       VITALS:  Patient Vitals for the past 24 hrs:   BP Temp Temp src Pulse Resp SpO2 Height Weight   01/22/24 1401 (!) 179/101 97.8  F (36.6  C) Oral 101 20 94 % 1.753 m (5' 9\") 78.5 kg (173 lb)        PHYSICAL EXAM    Constitutional:  Awake, alert, in mild apparent distress  HENT:  Normocephalic, Atraumatic. Bilateral external ears normal. Oropharynx moist. Nose normal. Neck- Normal range of motion with no guarding, No midline cervical tenderness, Supple, No stridor.   Eyes:  PERRL, EOMI with no signs of entrapment, Conjunctiva normal, No discharge.   Respiratory:  Normal breath sounds, No respiratory distress, No wheezing.    Cardiovascular:  Normal heart rate, Normal rhythm, No appreciable rubs or gallops.   GI:  Soft, No tenderness, No distension, " No palpable masses  Musculoskeletal:   No edema. Good range of motion in all major joints.  Mild left lumbar paraspinal tenderness to palpation or major deformities noted.  No CVA tenderness  Integument:  Warm, Dry, No erythema, No rash.   Neurologic:  Alert & oriented, Normal motor function, Normal sensory function, No focal deficits noted.   Psychiatric:  Affect normal, Judgment normal, Mood normal.     LAB:           Results for orders placed or performed during the hospital encounter of 01/22/24   Comprehensive metabolic panel     Status: Abnormal   Result Value Ref Range    Sodium 137 135 - 145 mmol/L    Potassium 4.4 3.4 - 5.3 mmol/L    Carbon Dioxide (CO2) 24 22 - 29 mmol/L    Anion Gap 14 7 - 15 mmol/L    Urea Nitrogen 17.1 8.0 - 23.0 mg/dL    Creatinine 0.97 0.67 - 1.17 mg/dL    GFR Estimate 81 >60 mL/min/1.73m2    Calcium 9.6 8.8 - 10.2 mg/dL    Chloride 99 98 - 107 mmol/L    Glucose 176 (H) 70 - 99 mg/dL    Alkaline Phosphatase 86 40 - 150 U/L    AST 22 0 - 45 U/L    ALT 26 0 - 70 U/L    Protein Total 8.4 (H) 6.4 - 8.3 g/dL    Albumin 4.7 3.5 - 5.2 g/dL    Bilirubin Total 0.6 <=1.2 mg/dL   UA with Microscopic reflex to Culture     Status: Abnormal    Specimen: Urine, Clean Catch   Result Value Ref Range    Color Urine Yellow Colorless, Straw, Light Yellow, Yellow    Appearance Urine Clear Clear    Glucose Urine Negative Negative mg/dL    Bilirubin Urine Negative Negative    Ketones Urine Trace (A) Negative mg/dL    Specific Gravity Urine 1.031 (H) 1.001 - 1.030    Blood Urine Negative Negative    pH Urine 5.0 5.0 - 7.0    Protein Albumin Urine 100 (A) Negative mg/dL    Urobilinogen Urine <2.0 <2.0 mg/dL    Nitrite Urine Negative Negative    Leukocyte Esterase Urine 75 Morgan/uL (A) Negative    Bacteria Urine Few (A) None Seen /HPF    Mucus Urine Present (A) None Seen /LPF    RBC Urine 10 (H) <=2 /HPF    WBC Urine 23 (H) <=5 /HPF    Hyaline Casts Urine 3 (H) <=2 /LPF    Narrative    Urine Culture ordered  based on laboratory criteria   CBC with platelets and differential     Status: Abnormal   Result Value Ref Range    WBC Count 10.4 4.0 - 11.0 10e3/uL    RBC Count 5.23 4.40 - 5.90 10e6/uL    Hemoglobin 15.2 13.3 - 17.7 g/dL    Hematocrit 47.0 40.0 - 53.0 %    MCV 90 78 - 100 fL    MCH 29.1 26.5 - 33.0 pg    MCHC 32.3 31.5 - 36.5 g/dL    RDW 13.7 10.0 - 15.0 %    Platelet Count 346 150 - 450 10e3/uL    % Neutrophils 81 %    % Lymphocytes 13 %    % Monocytes 4 %    % Eosinophils 1 %    % Basophils 1 %    % Immature Granulocytes 0 %    NRBCs per 100 WBC 0 <1 /100    Absolute Neutrophils 8.5 (H) 1.6 - 8.3 10e3/uL    Absolute Lymphocytes 1.3 0.8 - 5.3 10e3/uL    Absolute Monocytes 0.4 0.0 - 1.3 10e3/uL    Absolute Eosinophils 0.1 0.0 - 0.7 10e3/uL    Absolute Basophils 0.1 0.0 - 0.2 10e3/uL    Absolute Immature Granulocytes 0.0 <=0.4 10e3/uL    Absolute NRBCs 0.0 10e3/uL   CRP inflammation     Status: Abnormal   Result Value Ref Range    CRP Inflammation 5.20 (H) <5.00 mg/L   CBC with Platelets & Differential     Status: Abnormal    Narrative    The following orders were created for panel order CBC with Platelets & Differential.  Procedure                               Abnormality         Status                     ---------                               -----------         ------                     CBC with platelets and d...[[id number]]  Abnormal            Final result                 Please view results for these tests on the individual orders.          I, Clarita Grajeda, am serving as a scribe to document services personally performed by Thad Burgos MD, based on my observation and the provider's statements to me. I, Thad Burgos MD attest that Clarita Grajeda is acting in a scribe capacity, has observed my performance of the services and has documented them in accordance with my direction.    Thad Burgos M.D.  Emergency Medicine  [facility] EMERGENCY DEPARTMENT      Thad Burgos MD  01/22/24 1747

## 2024-01-24 LAB — BACTERIA UR CULT: NO GROWTH

## 2024-02-15 DIAGNOSIS — E11.9 TYPE 2 DIABETES MELLITUS WITHOUT COMPLICATION, WITHOUT LONG-TERM CURRENT USE OF INSULIN (H): ICD-10-CM

## 2024-02-16 RX ORDER — METFORMIN HCL 500 MG
TABLET, EXTENDED RELEASE 24 HR ORAL
Qty: 180 TABLET | Refills: 0 | Status: SHIPPED | OUTPATIENT
Start: 2024-02-16 | End: 2024-05-16

## 2024-02-16 NOTE — TELEPHONE ENCOUNTER
Medication requested: metFORMIN (GLUCOPHAGE XR) 500 MG 24 hr tablet   Last office visit: 11/28/23  [facility] appointments: none  Medication last refilled: 5/4/23; 180 + 1 refill  Last qualifying labs:   Component      Latest Ref Rng 1/22/2024  2:54 PM   Creatinine      0.67 - 1.17 mg/dL 0.97      Component      Latest Ref Rng 11/28/2023  3:55 PM   Hemoglobin A1C      <5.7 % 7.5 (H)      Prescription approved per [facility] Refill Protocol.    Sagar LINN, RN  02/16/24 8:40 AM

## 2024-03-14 ENCOUNTER — OFFICE VISIT (OUTPATIENT)
Dept: UROLOGY | Facility: CLINIC | Age: 79
End: 2024-03-14
Payer: MEDICARE

## 2024-03-14 VITALS
HEIGHT: 69 IN | SYSTOLIC BLOOD PRESSURE: 132 MMHG | OXYGEN SATURATION: 97 % | WEIGHT: 173 LBS | DIASTOLIC BLOOD PRESSURE: 92 MMHG | HEART RATE: 97 BPM | BODY MASS INDEX: 25.62 KG/M2

## 2024-03-14 DIAGNOSIS — Z85.46 PERSONAL HISTORY OF MALIGNANT NEOPLASM OF PROSTATE: ICD-10-CM

## 2024-03-14 LAB — PSA SERPL-MCNC: <0.04 UG/L (ref 0–4)

## 2024-03-14 PROCEDURE — 36415 COLL VENOUS BLD VENIPUNCTURE: CPT | Performed by: UROLOGY

## 2024-03-14 PROCEDURE — 99213 OFFICE O/P EST LOW 20 MIN: CPT | Performed by: UROLOGY

## 2024-03-14 PROCEDURE — 84153 ASSAY OF PSA TOTAL: CPT | Performed by: UROLOGY

## 2024-03-14 NOTE — PROGRESS NOTES
Office Visit Note  [facility]  [phone]    UROLOGIC DIAGNOSES:   pT2 Amber 3+4 equal 7 prostate cancer    CURRENT INTERVENTIONS:   Robotic prostatectomy December 2023    HISTORY:   Agustin returns to clinic today for urologic follow-up.  During his surgery he had a great deal of adhesions in the abdomen and required a lysis of adhesions.  He did have an extended ileus after surgery as well.  He now reports normal bowel function.  He says that, as expected, immediately after removing the Petersen catheter he had severe incontinence.  He says that he has been using a penile clamp since that time during most of the day.  He says he has no leakage with penile clamp and has a strong urinary stream.  His first postoperative PSA is undetectable.      PAST MEDICAL HISTORY:   Past Medical History:   Diagnosis Date    Esophageal reflux     Medicare annual wellness visit, subsequent 03/25/2018       PAST SURGICAL HISTORY:   Past Surgical History:   Procedure Laterality Date    APPENDECTOMY      cystoscopy with pyeloscopy with removal of calculus.      DAVINCI LYSIS OF ADHESIONS N/A 12/4/2023    Procedure: Davinci Lysis of Adhesions;  Surgeon: Kamlesh Henry MD;  Location: [facility] OR    DAVINCI PROSTATECTOMY, LYMPHADENECTOMY Bilateral 12/4/2023    Procedure: PROSTATECTOMY, ROBOT-ASSISTED, USING DA AIDA XI, WITH PELVIC LYMPHADENECTOMY, LYSIS OF ADHESIONS;  Surgeon: Kamlesh Henry MD;  Location: [facility] OR    PROSTATE SURGERY      ROTATOR CUFF REPAIR RT/LT      surgery testis exploration of undescended.  1985    TESTICLE SURGERY      VASECTOMY         FAMILY HISTORY: No family history on file.    SOCIAL HISTORY:   Social History     Socioeconomic History    Marital status:    Tobacco Use    Smoking status: Never    Smokeless tobacco: Never   Substance and Sexual Activity    Alcohol use: Yes     Alcohol/week: 1.0 standard drink of alcohol     Types: 1 Standard drinks or equivalent per week      Comment: occasional    Drug use: No    Sexual activity: Never     Social Determinants of Health     Financial Resource Strain: Low Risk  (11/21/2023)    Financial Resource Strain     Within the past 12 months, have you or your family members you live with been unable to get utilities (heat, electricity) when it was really needed?: No   Food Insecurity: Low Risk  (11/21/2023)    Food Insecurity     Within the past 12 months, did you worry that your food would run out before you got money to buy more?: No     Within the past 12 months, did the food you bought just not last and you didn t have money to get more?: No   Transportation Needs: Low Risk  (11/21/2023)    Transportation Needs     Within the past 12 months, has lack of transportation kept you from medical appointments, getting your medicines, non-medical meetings or appointments, work, or from getting things that you need?: No   Interpersonal Safety: Low Risk  (11/28/2023)    Interpersonal Safety     Do you feel physically and emotionally safe where you currently live?: Yes     Within the past 12 months, have you been hit, slapped, kicked or otherwise physically hurt by someone?: Patient refused     Within the past 12 months, have you been humiliated or emotionally abused in other ways by your partner or ex-partner?: Patient refused   Housing Stability: Low Risk  (11/21/2023)    Housing Stability     Do you have housing? : Yes     Are you worried about losing your housing?: No       Review Of Systems:  Skin: No rash, pruritis, or skin pigmentation  Eyes: No changes in vision  Ears/Nose/Throat: No changes in hearing, no nosebleeds  Respiratory: No shortness of breath, dyspnea on exertion, cough, or hemoptysis  Cardiovascular: No chest pain or palpitations  Gastrointestinal: No diarrhea or constipation. No abdominal pain. No hematochezia  Genitourinary: see HPI  Musculoskeletal: No pain or swelling of joints, normal range of motion  Neurologic: No weakness or  "tremors  Psychiatric: No recent changes in memory or mood  Hematologic/Lymphatic/Immunologic: No easy bruising or enlarged lymph nodes  Endocrine: No weight gain or loss      PHYSICAL EXAM:    BP (!) 132/92   Pulse 97   Ht 1.753 m (5' 9\")   Wt 78.5 kg (173 lb)   SpO2 97%   BMI 25.55 kg/m      Constitutional: Well developed. Conversant and in no acute distress  Eyes: Anicteric sclera, conjunctiva clear, normal extraocular movements  ENT: Normocephalic and atraumatic,   Skin: Warm and dry. No rashes or lesions  Cardiac: No peripheral edema  Back/Flank: Not done  CNS/PNS: Normal musculature and movements, moves all extremities normally  Respiratory: Normal non-labored breathing  Abdomen: Soft nontender and nondistended  Peripheral Vascular: No peripheral edema  Mental Status/Psych: Alert and Oriented x 3. Normal mood and affect    Penis: Not done  Scrotal Skin: Not done  Testicles: Not done  Epididymis: Not done  Digital Rectal Exam:     Cystoscopy: Not done    Imaging: None    Urinalysis: UA RESULTS:  Recent Labs   Lab Test 01/22/24  1448 12/08/23  1801 04/20/23  1054   COLOR Yellow   < > Yellow   APPEARANCE Clear   < > Clear   URINEGLC Negative   < > 500*   URINEBILI Negative   < > Negative   URINEKETONE Trace*   < > Trace*   SG 1.031*   < > >=1.030   UBLD Negative   < > Negative   URINEPH 5.0   < > 5.5   PROTEIN 100*   < > 30*   UROBILINOGEN  --   --  0.2   NITRITE Negative   < > Negative   LEUKEST 75 Morgan/uL*   < > Negative   RBCU 10*   < >  --    WBCU 23*   < >  --     < > = values in this interval not displayed.       PSA: Undetectable    Post Void Residual:     Other labs: None today      IMPRESSION:  Doing well, PSA undetectable    PLAN:  He is doing well from a prostate cancer standpoint.  We did discuss the risks of penile necrosis with a penile clamp and I discouraged penile clamp use.  I encouraged him to stop wearing the clamp and instead perform Kegel exercises.  We also discussed potential referral " to pelvic floor physical therapy but he declines this for now.  We discussed the surveillance plan and I will see him back in 3 months for his next PSA check.        Kamlesh Henry M.D.

## 2024-03-14 NOTE — NURSING NOTE
Chief Complaint   Patient presents with    Personal history of malignant neoplasm of prostate     Patient here today for SD PSA and Exam     Patient state doing well   Per patient here for another PSA check            Aniya Saha, ELISAA

## 2024-03-14 NOTE — LETTER
3/14/2024       RE: Agustin Nolan  [address] [facility] [address]     Dear Colleague,    Thank you for referring your patient, Agustin Nolan, to the [facility] at [facility]. Please see a copy of my visit note below.    Office Visit Note  [facility]  [phone]    UROLOGIC DIAGNOSES:   pT2 [place] 3+4 equal 7 prostate cancer    CURRENT INTERVENTIONS:   Robotic prostatectomy December 2023    HISTORY:   Agustin returns to clinic today for urologic follow-up.  During his surgery he had a great deal of adhesions in the abdomen and required a lysis of adhesions.  He did have an extended ileus after surgery as well.  He now reports normal bowel function.  He says that, as expected, immediately after removing the Petersen catheter he had severe incontinence.  He says that he has been using a penile clamp since that time during most of the day.  He says he has no leakage with penile clamp and has a strong urinary stream.  His first postoperative PSA is undetectable.      PAST MEDICAL HISTORY:   Past Medical History:   Diagnosis Date    Esophageal reflux     Medicare annual wellness visit, subsequent 03/25/2018       PAST SURGICAL HISTORY:   Past Surgical History:   Procedure Laterality Date    APPENDECTOMY      cystoscopy with pyeloscopy with removal of calculus.      DAVINCI LYSIS OF ADHESIONS N/A 12/4/2023    Procedure: Davinci Lysis of Adhesions;  Surgeon: Kamlesh Henry MD;  Location: [facility] OR    DAVINCI PROSTATECTOMY, LYMPHADENECTOMY Bilateral 12/4/2023    Procedure: PROSTATECTOMY, ROBOT-ASSISTED, USING DA AIDA XI, WITH PELVIC LYMPHADENECTOMY, LYSIS OF ADHESIONS;  Surgeon: Kamlesh Henry MD;  Location: [facility] OR    PROSTATE SURGERY      ROTATOR CUFF REPAIR RT/LT      surgery testis exploration of undescended.  1985    TESTICLE SURGERY      VASECTOMY         FAMILY HISTORY: No family history on file.    SOCIAL  HISTORY:   Social History     Socioeconomic History    Marital status:    Tobacco Use    Smoking status: Never    Smokeless tobacco: Never   Substance and Sexual Activity    Alcohol use: Yes     Alcohol/week: 1.0 standard drink of alcohol     Types: 1 Standard drinks or equivalent per week     Comment: occasional    Drug use: No    Sexual activity: Never     Social Determinants of Health     Financial Resource Strain: Low Risk  (11/21/2023)    Financial Resource Strain     Within the past 12 months, have you or your family members you live with been unable to get utilities (heat, electricity) when it was really needed?: No   Food Insecurity: Low Risk  (11/21/2023)    Food Insecurity     Within the past 12 months, did you worry that your food would run out before you got money to buy more?: No     Within the past 12 months, did the food you bought just not last and you didn t have money to get more?: No   Transportation Needs: Low Risk  (11/21/2023)    Transportation Needs     Within the past 12 months, has lack of transportation kept you from medical appointments, getting your medicines, non-medical meetings or appointments, work, or from getting things that you need?: No   Interpersonal Safety: Low Risk  (11/28/2023)    Interpersonal Safety     Do you feel physically and emotionally safe where you currently live?: Yes     Within the past 12 months, have you been hit, slapped, kicked or otherwise physically hurt by someone?: Patient refused     Within the past 12 months, have you been humiliated or emotionally abused in other ways by your partner or ex-partner?: Patient refused   Housing Stability: Low Risk  (11/21/2023)    Housing Stability     Do you have housing? : Yes     Are you worried about losing your housing?: No       Review Of Systems:  Skin: No rash, pruritis, or skin pigmentation  Eyes: No changes in vision  Ears/Nose/Throat: No changes in hearing, no nosebleeds  Respiratory: No shortness of  "breath, dyspnea on exertion, cough, or hemoptysis  Cardiovascular: No chest pain or palpitations  Gastrointestinal: No diarrhea or constipation. No abdominal pain. No hematochezia  Genitourinary: see HPI  Musculoskeletal: No pain or swelling of joints, normal range of motion  Neurologic: No weakness or tremors  Psychiatric: No recent changes in memory or mood  Hematologic/Lymphatic/Immunologic: No easy bruising or enlarged lymph nodes  Endocrine: No weight gain or loss      PHYSICAL EXAM:    BP (!) 132/92   Pulse 97   Ht 1.753 m (5' 9\")   Wt 78.5 kg (173 lb)   SpO2 97%   BMI 25.55 kg/m      Constitutional: Well developed. Conversant and in no acute distress  Eyes: Anicteric sclera, conjunctiva clear, normal extraocular movements  ENT: Normocephalic and atraumatic,   Skin: Warm and dry. No rashes or lesions  Cardiac: No peripheral edema  Back/Flank: Not done  CNS/PNS: Normal musculature and movements, moves all extremities normally  Respiratory: Normal non-labored breathing  Abdomen: Soft nontender and nondistended  Peripheral Vascular: No peripheral edema  Mental Status/Psych: Alert and Oriented x 3. Normal mood and affect    Penis: Not done  Scrotal Skin: Not done  Testicles: Not done  Epididymis: Not done  Digital Rectal Exam:     Cystoscopy: Not done    Imaging: None    Urinalysis: UA RESULTS:  Recent Labs   Lab Test 01/22/24  1448 12/08/23  1801 04/20/23  1054   COLOR Yellow   < > Yellow   APPEARANCE Clear   < > Clear   URINEGLC Negative   < > 500*   URINEBILI Negative   < > Negative   URINEKETONE Trace*   < > Trace*   SG 1.031*   < > >=1.030   UBLD Negative   < > Negative   URINEPH 5.0   < > 5.5   PROTEIN 100*   < > 30*   UROBILINOGEN  --   --  0.2   NITRITE Negative   < > Negative   LEUKEST 75 Morgan/uL*   < > Negative   RBCU 10*   < >  --    WBCU 23*   < >  --     < > = values in this interval not displayed.       PSA: Undetectable    Post Void Residual:     Other labs: None today      IMPRESSION:  Doing " well, PSA undetectable    PLAN:  He is doing well from a prostate cancer standpoint.  We did discuss the risks of penile necrosis with a penile clamp and I discouraged penile clamp use.  I encouraged him to stop wearing the clamp and instead perform Kegel exercises.  We also discussed potential referral to pelvic floor physical therapy but he declines this for now.  We discussed the surveillance plan and I will see him back in 3 months for his next PSA check.        Kamlesh Henry M.D.

## 2024-04-01 DIAGNOSIS — K21.00 GASTROESOPHAGEAL REFLUX DISEASE WITH ESOPHAGITIS: ICD-10-CM

## 2024-04-02 NOTE — TELEPHONE ENCOUNTER
Medication requested: omeprazole (PRILOSEC) 20 MG DR capsule   Last office visit: 11/28/23  [facility] appointments: none  Medication last refilled: 10/3/23; 90 + 1 refill  Last qualifying labs: N/A    Prescription approved per [facility] Refill Protocol.    Sagar LINN, RN  04/02/24 11:47 AM

## 2024-05-16 DIAGNOSIS — E11.9 TYPE 2 DIABETES MELLITUS WITHOUT COMPLICATION, WITHOUT LONG-TERM CURRENT USE OF INSULIN (H): ICD-10-CM

## 2024-05-16 RX ORDER — METFORMIN HCL 500 MG
TABLET, EXTENDED RELEASE 24 HR ORAL
Qty: 180 TABLET | Refills: 0 | Status: SHIPPED | OUTPATIENT
Start: 2024-05-16 | End: 2024-08-12

## 2024-05-16 NOTE — TELEPHONE ENCOUNTER
Medication requested: metFORMIN (GLUCOPHAGE XR) 500 MG 24 hr tablet   Last office visit: 11/28/23  [facility] appointments: 7/17/24  Medication last refilled: 2/16/24; 180 + 0 refills  Last qualifying labs:   Component      Latest Ref Rng 1/22/2024  2:54 PM   GFR Estimate      >60 mL/min/1.73m2 81      Component      Latest Ref Rng 11/28/2023  3:55 PM   Hemoglobin A1C      <5.7 % 7.5 (H)      Prescription approved per [facility] Refill Protocol.    Sagar LINN, RN  05/16/24 12:03 PM

## 2024-06-18 ENCOUNTER — OFFICE VISIT (OUTPATIENT)
Dept: UROLOGY | Facility: CLINIC | Age: 79
End: 2024-06-18
Payer: MEDICARE

## 2024-06-18 VITALS
DIASTOLIC BLOOD PRESSURE: 76 MMHG | SYSTOLIC BLOOD PRESSURE: 124 MMHG | WEIGHT: 175 LBS | BODY MASS INDEX: 25.92 KG/M2 | HEART RATE: 95 BPM | HEIGHT: 69 IN | OXYGEN SATURATION: 96 %

## 2024-06-18 DIAGNOSIS — Z85.46 PERSONAL HISTORY OF MALIGNANT NEOPLASM OF PROSTATE: Primary | ICD-10-CM

## 2024-06-18 LAB — PSA SERPL-MCNC: <0.04 UG/L (ref 0–4)

## 2024-06-18 PROCEDURE — 84153 ASSAY OF PSA TOTAL: CPT | Performed by: UROLOGY

## 2024-06-18 PROCEDURE — 36415 COLL VENOUS BLD VENIPUNCTURE: CPT | Performed by: UROLOGY

## 2024-06-18 PROCEDURE — 99213 OFFICE O/P EST LOW 20 MIN: CPT | Performed by: UROLOGY

## 2024-06-18 NOTE — PROGRESS NOTES
Office Visit Note  [facility]  [phone]    UROLOGIC DIAGNOSES:   pT2 [place] 3+4 = 7 prostate cancer    CURRENT INTERVENTIONS:   Robotic prostatectomy December 2023    HISTORY:   Agustin returns to clinic today for urologic follow-up.  His PSA remains undetectable.  Since his last visit with me he did Kegel exercises and he says that now his retention is resolved.  He wears 1 pad per day but says he rarely leaks at this time.  He reports a good urinary stream.      PAST MEDICAL HISTORY:   Past Medical History:   Diagnosis Date    Esophageal reflux     Medicare annual wellness visit, subsequent 03/25/2018       PAST SURGICAL HISTORY:   Past Surgical History:   Procedure Laterality Date    APPENDECTOMY      cystoscopy with pyeloscopy with removal of calculus.      DAVINCI LYSIS OF ADHESIONS N/A 12/4/2023    Procedure: Davinci Lysis of Adhesions;  Surgeon: Kamlesh Henry MD;  Location: [facility] OR    DAVINCI PROSTATECTOMY, LYMPHADENECTOMY Bilateral 12/4/2023    Procedure: PROSTATECTOMY, ROBOT-ASSISTED, USING DA AIDA XI, WITH PELVIC LYMPHADENECTOMY, LYSIS OF ADHESIONS;  Surgeon: Kamlesh Henry MD;  Location: [facility] OR    PROSTATE SURGERY      ROTATOR CUFF REPAIR RT/LT      surgery testis exploration of undescended.  1985    TESTICLE SURGERY      VASECTOMY         FAMILY HISTORY: History reviewed. No pertinent family history.    SOCIAL HISTORY:   Social History     Socioeconomic History    Marital status:      Spouse name: None    Number of children: None    Years of education: None    Highest education level: None   Tobacco Use    Smoking status: Never    Smokeless tobacco: Never   Substance and Sexual Activity    Alcohol use: Yes     Alcohol/week: 1.0 standard drink of alcohol     Types: 1 Standard drinks or equivalent per week     Comment: occasional    Drug use: No    Sexual activity: Never     Social Determinants of Health     Financial Resource Strain: Low Risk  (11/21/2023)     Financial Resource Strain     Within the past 12 months, have you or your family members you live with been unable to get utilities (heat, electricity) when it was really needed?: No   Food Insecurity: Low Risk  (11/21/2023)    Food Insecurity     Within the past 12 months, did you worry that your food would run out before you got money to buy more?: No     Within the past 12 months, did the food you bought just not last and you didn t have money to get more?: No   Transportation Needs: Low Risk  (11/21/2023)    Transportation Needs     Within the past 12 months, has lack of transportation kept you from medical appointments, getting your medicines, non-medical meetings or appointments, work, or from getting things that you need?: No   Interpersonal Safety: Low Risk  (11/28/2023)    Interpersonal Safety     Do you feel physically and emotionally safe where you currently live?: Yes     Within the past 12 months, have you been hit, slapped, kicked or otherwise physically hurt by someone?: Patient refused     Within the past 12 months, have you been humiliated or emotionally abused in other ways by your partner or ex-partner?: Patient refused   Housing Stability: Low Risk  (11/21/2023)    Housing Stability     Do you have housing? : Yes     Are you worried about losing your housing?: No       Review Of Systems:  Skin: No rash, pruritis, or skin pigmentation  Eyes: No changes in vision  Ears/Nose/Throat: No changes in hearing, no nosebleeds  Respiratory: No shortness of breath, dyspnea on exertion, cough, or hemoptysis  Cardiovascular: No chest pain or palpitations  Gastrointestinal: No diarrhea or constipation. No abdominal pain. No hematochezia  Genitourinary: see HPI  Musculoskeletal: No pain or swelling of joints, normal range of motion  Neurologic: No weakness or tremors  Psychiatric: No recent changes in memory or mood  Hematologic/Lymphatic/Immunologic: No easy bruising or enlarged lymph nodes  Endocrine: No  "weight gain or loss      PHYSICAL EXAM:    /76   Pulse 95   Ht 1.753 m (5' 9\")   Wt 79.4 kg (175 lb)   SpO2 96%   BMI 25.84 kg/m      Constitutional: Well developed. Conversant and in no acute distress  Eyes: Anicteric sclera, conjunctiva clear, normal extraocular movements  ENT: Normocephalic and atraumatic,   Skin: Warm and dry. No rashes or lesions  Cardiac: No peripheral edema  Back/Flank: Not done  CNS/PNS: Normal musculature and movements, moves all extremities normally  Respiratory: Normal non-labored breathing  Abdomen: Soft nontender and nondistended  Peripheral Vascular: No peripheral edema  Mental Status/Psych: Alert and Oriented x 3. Normal mood and affect    Penis: Not done  Scrotal Skin: Not done  Testicles: Not done  Epididymis: Not done  Digital Rectal Exam:     Cystoscopy: Not done    Imaging: None    Urinalysis: UA RESULTS:  Recent Labs   Lab Test 01/22/24  1448 12/08/23  1801 04/20/23  1054   COLOR Yellow   < > Yellow   APPEARANCE Clear   < > Clear   URINEGLC Negative   < > 500*   URINEBILI Negative   < > Negative   URINEKETONE Trace*   < > Trace*   SG 1.031*   < > >=1.030   UBLD Negative   < > Negative   URINEPH 5.0   < > 5.5   PROTEIN 100*   < > 30*   UROBILINOGEN  --   --  0.2   NITRITE Negative   < > Negative   LEUKEST 75 Morgan/uL*   < > Negative   RBCU 10*   < >  --    WBCU 23*   < >  --     < > = values in this interval not displayed.       PSA: Undetectable    Post Void Residual:     Other labs: None today      IMPRESSION:  Doing well, PSA undetectable    PLAN:  He is doing well from a prostate cancer standpoint.  We discussed the surveillance plan.  I will have him follow-up with our advanced practice provider from now on.  In 6 months he should have another PSA checked.  At that time if it remains undetectable, he can move on to annual PSA checks.      Kamlesh Henry M.D.            "

## 2024-06-18 NOTE — LETTER
6/18/2024       RE: Agustin Nolan  [address] [facility] [address]     Dear Colleague,    Thank you for referring your patient, Agustin Nolan, to the [facility] UROLOGY [facility]IC [facility] at [facility]. Please see a copy of my visit note below.    Office Visit Note  [facility]  [phone]    UROLOGIC DIAGNOSES:   pT2 [place] 3+4 = 7 prostate cancer    CURRENT INTERVENTIONS:   Robotic prostatectomy December 2023    HISTORY:   Agustin returns to clinic today for urologic follow-up.  His PSA remains undetectable.  Since his last visit with me he did Kegel exercises and he says that now his retention is resolved.  He wears 1 pad per day but says he rarely leaks at this time.  He reports a good urinary stream.      PAST MEDICAL HISTORY:   Past Medical History:   Diagnosis Date    Esophageal reflux     Medicare annual wellness visit, subsequent 03/25/2018       PAST SURGICAL HISTORY:   Past Surgical History:   Procedure Laterality Date    APPENDECTOMY      cystoscopy with pyeloscopy with removal of calculus.      DAVINCI LYSIS OF ADHESIONS N/A 12/4/2023    Procedure: Davinci Lysis of Adhesions;  Surgeon: Kamlesh Henry MD;  Location: [facility] OR    DAVINCI PROSTATECTOMY, LYMPHADENECTOMY Bilateral 12/4/2023    Procedure: PROSTATECTOMY, ROBOT-ASSISTED, USING DA AIDA XI, WITH PELVIC LYMPHADENECTOMY, LYSIS OF ADHESIONS;  Surgeon: Kamlesh Henry MD;  Location: [facility] OR    PROSTATE SURGERY      ROTATOR CUFF REPAIR RT/LT      surgery testis exploration of undescended.  1985    TESTICLE SURGERY      VASECTOMY         FAMILY HISTORY: History reviewed. No pertinent family history.    SOCIAL HISTORY:   Social History     Socioeconomic History    Marital status:      Spouse name: None    Number of children: None    Years of education: None    Highest education level: None   Tobacco Use    Smoking status: Never    Smokeless tobacco: Never    Substance and Sexual Activity    Alcohol use: Yes     Alcohol/week: 1.0 standard drink of alcohol     Types: 1 Standard drinks or equivalent per week     Comment: occasional    Drug use: No    Sexual activity: Never     Social Determinants of Health     Financial Resource Strain: Low Risk  (11/21/2023)    Financial Resource Strain     Within the past 12 months, have you or your family members you live with been unable to get utilities (heat, electricity) when it was really needed?: No   Food Insecurity: Low Risk  (11/21/2023)    Food Insecurity     Within the past 12 months, did you worry that your food would run out before you got money to buy more?: No     Within the past 12 months, did the food you bought just not last and you didn t have money to get more?: No   Transportation Needs: Low Risk  (11/21/2023)    Transportation Needs     Within the past 12 months, has lack of transportation kept you from medical appointments, getting your medicines, non-medical meetings or appointments, work, or from getting things that you need?: No   Interpersonal Safety: Low Risk  (11/28/2023)    Interpersonal Safety     Do you feel physically and emotionally safe where you currently live?: Yes     Within the past 12 months, have you been hit, slapped, kicked or otherwise physically hurt by someone?: Patient refused     Within the past 12 months, have you been humiliated or emotionally abused in other ways by your partner or ex-partner?: Patient refused   Housing Stability: Low Risk  (11/21/2023)    Housing Stability     Do you have housing? : Yes     Are you worried about losing your housing?: No       Review Of Systems:  Skin: No rash, pruritis, or skin pigmentation  Eyes: No changes in vision  Ears/Nose/Throat: No changes in hearing, no nosebleeds  Respiratory: No shortness of breath, dyspnea on exertion, cough, or hemoptysis  Cardiovascular: No chest pain or palpitations  Gastrointestinal: No diarrhea or constipation. No  "abdominal pain. No hematochezia  Genitourinary: see HPI  Musculoskeletal: No pain or swelling of joints, normal range of motion  Neurologic: No weakness or tremors  Psychiatric: No recent changes in memory or mood  Hematologic/Lymphatic/Immunologic: No easy bruising or enlarged lymph nodes  Endocrine: No weight gain or loss      PHYSICAL EXAM:    /76   Pulse 95   Ht 1.753 m (5' 9\")   Wt 79.4 kg (175 lb)   SpO2 96%   BMI 25.84 kg/m      Constitutional: Well developed. Conversant and in no acute distress  Eyes: Anicteric sclera, conjunctiva clear, normal extraocular movements  ENT: Normocephalic and atraumatic,   Skin: Warm and dry. No rashes or lesions  Cardiac: No peripheral edema  Back/Flank: Not done  CNS/PNS: Normal musculature and movements, moves all extremities normally  Respiratory: Normal non-labored breathing  Abdomen: Soft nontender and nondistended  Peripheral Vascular: No peripheral edema  Mental Status/Psych: Alert and Oriented x 3. Normal mood and affect    Penis: Not done  Scrotal Skin: Not done  Testicles: Not done  Epididymis: Not done  Digital Rectal Exam:     Cystoscopy: Not done    Imaging: None    Urinalysis: UA RESULTS:  Recent Labs   Lab Test 01/22/24  1448 12/08/23  1801 04/20/23  1054   COLOR Yellow   < > Yellow   APPEARANCE Clear   < > Clear   URINEGLC Negative   < > 500*   URINEBILI Negative   < > Negative   URINEKETONE Trace*   < > Trace*   SG 1.031*   < > >=1.030   UBLD Negative   < > Negative   URINEPH 5.0   < > 5.5   PROTEIN 100*   < > 30*   UROBILINOGEN  --   --  0.2   NITRITE Negative   < > Negative   LEUKEST 75 [place]/uL*   < > Negative   RBCU 10*   < >  --    WBCU 23*   < >  --     < > = values in this interval not displayed.       PSA: Undetectable    Post Void Residual:     Other labs: None today      IMPRESSION:  Doing well, PSA undetectable    PLAN:  He is doing well from a prostate cancer standpoint.  We discussed the surveillance plan.  I will have him follow-up with " our advanced practice provider from now on.  In 6 months he should have another PSA checked.  At that time if it remains undetectable, he can move on to annual PSA checks.      Kamlesh Henry M.D.

## 2024-06-22 ENCOUNTER — HEALTH MAINTENANCE LETTER (OUTPATIENT)
Age: 79
End: 2024-06-22

## 2024-06-24 DIAGNOSIS — E78.5 HYPERLIPIDEMIA LDL GOAL <100: ICD-10-CM

## 2024-06-24 RX ORDER — PRAVASTATIN SODIUM 40 MG
40 TABLET ORAL DAILY
Qty: 90 TABLET | Refills: 0 | Status: SHIPPED | OUTPATIENT
Start: 2024-06-24 | End: 2024-09-23

## 2024-06-24 NOTE — TELEPHONE ENCOUNTER
Medication requested: pravastatin (PRAVACHOL) 40 MG tablet   Last office visit: 11/28/2023  [facility] appointments: 7/17/2024  Medication last refilled: 7/5/2023; 90 + 3 refills  Last qualifying labs:   Component      Latest Ref Rng 5/2/2023  1:43 PM   LDL Cholesterol Calculated      <=100 mg/dL 108 (H)       Medication is being filled for 1 time refill only due to:   pt has upcoming annual exam with Dr. Tomlin.    TEMITOPE Corcoran, RN  06/24/24, 4:05 PM

## 2024-06-25 DIAGNOSIS — E11.9 DIABETES MELLITUS, TYPE 2 (H): ICD-10-CM

## 2024-06-25 RX ORDER — BLOOD SUGAR DIAGNOSTIC
1 STRIP MISCELLANEOUS DAILY
Qty: 100 STRIP | Refills: 1 | Status: SHIPPED | OUTPATIENT
Start: 2024-06-25

## 2024-06-25 NOTE — TELEPHONE ENCOUNTER
Medication requested: ACCU-CHEK GUIDE test strip   Last office visit: 11/28/23  [facility] appointments: 7/17/24  Medication last refilled: 12/11/23; 100 + 1 refill  Last qualifying labs:   Component      Latest Ref Rng 11/28/2023  3:55 PM   Hemoglobin A1C      <5.7 % 7.5 (H)       Prescription approved per [facility] Refill Protocol.    Sagar LINN, RN  06/25/24 1:00 PM

## 2024-07-01 DIAGNOSIS — K21.00 GASTROESOPHAGEAL REFLUX DISEASE WITH ESOPHAGITIS: ICD-10-CM

## 2024-07-02 NOTE — TELEPHONE ENCOUNTER
Medication requested: omeprazole (PRILOSEC) 20 MG DR capsule   Last office visit: 11/28/2023  [facility] appointments: 7/17/2024  Medication last refilled: 4/2/2024; 90 + 0 refills  Last qualifying labs: n/a    Prescription approved per [facility] Refill Protocol.    TEMITOPE Corcoran, RN  07/02/24, 11:04 AM

## 2024-07-15 SDOH — HEALTH STABILITY: PHYSICAL HEALTH: ON AVERAGE, HOW MANY DAYS PER WEEK DO YOU ENGAGE IN MODERATE TO STRENUOUS EXERCISE (LIKE A BRISK WALK)?: 0 DAYS

## 2024-07-15 SDOH — HEALTH STABILITY: PHYSICAL HEALTH: ON AVERAGE, HOW MANY MINUTES DO YOU ENGAGE IN EXERCISE AT THIS LEVEL?: 0 MIN

## 2024-07-15 ASSESSMENT — SOCIAL DETERMINANTS OF HEALTH (SDOH): HOW OFTEN DO YOU GET TOGETHER WITH FRIENDS OR RELATIVES?: TWICE A WEEK

## 2024-07-17 ENCOUNTER — OFFICE VISIT (OUTPATIENT)
Dept: FAMILY MEDICINE | Facility: CLINIC | Age: 79
End: 2024-07-17
Payer: COMMERCIAL

## 2024-07-17 DIAGNOSIS — C61 PROSTATE CANCER (H): ICD-10-CM

## 2024-07-17 DIAGNOSIS — Z00.00 MEDICARE ANNUAL WELLNESS VISIT, SUBSEQUENT: Primary | ICD-10-CM

## 2024-07-17 DIAGNOSIS — E78.5 HYPERLIPIDEMIA LDL GOAL <130: ICD-10-CM

## 2024-07-17 DIAGNOSIS — E11.9 TYPE 2 DIABETES MELLITUS WITHOUT COMPLICATION, WITHOUT LONG-TERM CURRENT USE OF INSULIN (H): ICD-10-CM

## 2024-07-17 LAB — HBA1C MFR BLD: 6.6 % (ref 0–5.6)

## 2024-07-17 NOTE — NURSING NOTE
"78 year old  Chief Complaint   Patient presents with    Wellness Visit       Blood pressure (!) 152/90, pulse 79, temperature 98.1  F (36.7  C), height 1.758 m (5' 9.21\"), weight 82.6 kg (182 lb 0.6 oz), SpO2 99%. Body mass index is 26.72 kg/m .  Patient Active Problem List   Diagnosis    Hyperlipidemia LDL goal <130    Medicare annual wellness visit, subsequent    Idiopathic peripheral neuropathy    Wears hearing aid in both ears    Diabetes mellitus, type 2 (H)    Prostate cancer (H)    Postoperative ileus (H)       Wt Readings from Last 2 Encounters:   07/17/24 82.6 kg (182 lb 0.6 oz)   06/18/24 79.4 kg (175 lb)     BP Readings from Last 3 Encounters:   07/17/24 (!) 152/90   06/18/24 124/76   03/14/24 (!) 132/92         Current Outpatient Medications   Medication Sig Dispense Refill    ACCU-CHEK GUIDE test strip 1 strip by In Vitro route daily To test blood sugar 100 strip 1    aspirin (ASA) 81 MG EC tablet Take 81 mg by mouth daily 300 tablet 1    blood glucose monitoring (ACCU-CHEK JHON PLUS) meter device kit Use to test blood sugar 1 times daily or as directed. 1 kit 0    Cholecalciferol (VITAMIN D) 2000 UNITS tablet Take 1 tablet by mouth daily      metFORMIN (GLUCOPHAGE XR) 500 MG 24 hr tablet Take one tab with breakfast and one tablet with dinner. 180 tablet 0    omeprazole (PRILOSEC) 20 MG DR capsule Take 1 capsule (20 mg) by mouth daily 90 capsule 0    pravastatin (PRAVACHOL) 40 MG tablet Take 1 tablet (40 mg) by mouth daily 90 tablet 0     No current facility-administered medications for this visit.       Social History     Tobacco Use    Smoking status: Never    Smokeless tobacco: Never   Substance Use Topics    Alcohol use: Yes     Alcohol/week: 1.0 standard drink of alcohol     Types: 1 Standard drinks or equivalent per week     Comment: occasional    Drug use: No       Health Maintenance Due   Topic Date Due    ADVANCE CARE PLANNING  Never done    RSV VACCINE (Pregnancy & 60+) (1 - 1-dose 60+ " "series) Never done    ZOSTER IMMUNIZATION (2 of 3) 03/24/2014    DTAP/TDAP/TD IMMUNIZATION (2 - Td or Tdap) 03/08/2022    COVID-19 Vaccine (1 - 2023-24 season) Never done    EYE EXAM  02/28/2024    MEDICARE ANNUAL WELLNESS VISIT  05/02/2024    LIPID  05/02/2024    MICROALBUMIN  05/02/2024    DIABETIC FOOT EXAM  05/02/2024    A1C  05/28/2024       No results found for: \"PAP\"      July 17, 2024 1:06 PM    "

## 2024-07-18 LAB
ALT SERPL W P-5'-P-CCNC: 17 U/L (ref 0–70)
ANION GAP SERPL CALCULATED.3IONS-SCNC: 11 MMOL/L (ref 7–15)
BUN SERPL-MCNC: 22.5 MG/DL (ref 8–23)
CALCIUM SERPL-MCNC: 9.5 MG/DL (ref 8.8–10.4)
CHLORIDE SERPL-SCNC: 103 MMOL/L (ref 98–107)
CHOLEST SERPL-MCNC: 169 MG/DL
CREAT SERPL-MCNC: 1.07 MG/DL (ref 0.67–1.17)
CREAT UR-MCNC: 291 MG/DL
EGFRCR SERPLBLD CKD-EPI 2021: 72 ML/MIN/1.73M2
FASTING STATUS PATIENT QL REPORTED: YES
FASTING STATUS PATIENT QL REPORTED: YES
GLUCOSE SERPL-MCNC: 127 MG/DL (ref 70–99)
HCO3 SERPL-SCNC: 26 MMOL/L (ref 22–29)
HDLC SERPL-MCNC: 42 MG/DL
LDLC SERPL CALC-MCNC: 95 MG/DL
MICROALBUMIN UR-MCNC: 118 MG/L
MICROALBUMIN/CREAT UR: 40.55 MG/G CR (ref 0–17)
NONHDLC SERPL-MCNC: 127 MG/DL
POTASSIUM SERPL-SCNC: 4.6 MMOL/L (ref 3.4–5.3)
SODIUM SERPL-SCNC: 140 MMOL/L (ref 135–145)
TRIGL SERPL-MCNC: 162 MG/DL

## 2024-08-04 VITALS
BODY MASS INDEX: 26.96 KG/M2 | SYSTOLIC BLOOD PRESSURE: 127 MMHG | TEMPERATURE: 98.1 F | HEART RATE: 79 BPM | DIASTOLIC BLOOD PRESSURE: 84 MMHG | HEIGHT: 69 IN | WEIGHT: 182.04 LBS | OXYGEN SATURATION: 99 %

## 2024-08-12 DIAGNOSIS — E11.9 TYPE 2 DIABETES MELLITUS WITHOUT COMPLICATION, WITHOUT LONG-TERM CURRENT USE OF INSULIN (H): ICD-10-CM

## 2024-08-13 RX ORDER — METFORMIN HCL 500 MG
TABLET, EXTENDED RELEASE 24 HR ORAL
Qty: 180 TABLET | Refills: 1 | Status: SHIPPED | OUTPATIENT
Start: 2024-08-13

## 2024-08-13 NOTE — TELEPHONE ENCOUNTER
Medication requested: metFORMIN (GLUCOPHAGE XR) 500 MG 24 hr tablet   Last office visit: 7/17/24  [facility] appointments: none  Medication last refilled: 5/16/24; 180 + 0 refills  Last qualifying labs:   Component      Latest Ref Rng 7/17/2024  2:12 PM   Hemoglobin A1C      0.0 - 5.6 % 6.6 (H)      Component      Latest Ref Rng 7/17/2024  2:12 PM   GFR Estimate      >60 mL/min/1.73m2 72      Prescription approved per [facility] Refill Protocol.    Sagar LINN, RN  08/13/24 11:06 AM

## 2024-08-27 ENCOUNTER — TRANSFERRED RECORDS (OUTPATIENT)
Dept: HEALTH INFORMATION MANAGEMENT | Facility: CLINIC | Age: 79
End: 2024-08-27
Payer: MEDICARE

## 2024-09-23 DIAGNOSIS — E78.5 HYPERLIPIDEMIA LDL GOAL <100: ICD-10-CM

## 2024-09-24 RX ORDER — PRAVASTATIN SODIUM 40 MG
40 TABLET ORAL DAILY
Qty: 90 TABLET | Refills: 3 | Status: SHIPPED | OUTPATIENT
Start: 2024-09-24

## 2024-09-24 NOTE — TELEPHONE ENCOUNTER
Medication requested: pravastatin (PRAVACHOL) 40 MG tablet   Last office visit:   [facility] appointments: none  Medication last refilled: 6/24/24; 90 + 0 refills  Last qualifying labs:   Component      Latest Ref Rng 7/17/2024  2:12 PM   Cholesterol      <200 mg/dL 169    Triglycerides      <150 mg/dL 162 (H)    HDL Cholesterol      >=40 mg/dL 42    LDL Cholesterol Calculated      <=100 mg/dL 95    Non HDL Cholesterol      <130 mg/dL 127    Patient Fasting? Yes      Prescription approved per [facility] Refill Protocol.    Sagar LINN, RN  09/24/24 9:50 AM

## 2024-09-30 DIAGNOSIS — K21.00 GASTROESOPHAGEAL REFLUX DISEASE WITH ESOPHAGITIS: ICD-10-CM

## 2024-10-01 NOTE — TELEPHONE ENCOUNTER
Medication requested: omeprazole (PRILOSEC) 20 MG DR capsule   Last office visit: 7/17/2024  [facility] appointments: none  Medication last refilled: 7/2/2024; 90 caps + 0 refills  Last qualifying labs: n/a    Prescription approved per [facility] Refill Protocol.    TEMITOPE Corcoran, RN  10/01/24, 11:16 AM

## 2024-12-12 ENCOUNTER — HOSPITAL ENCOUNTER (EMERGENCY)
Facility: HOSPITAL | Age: 79
Discharge: HOME | End: 2024-12-12
Attending: STUDENT IN AN ORGANIZED HEALTH CARE EDUCATION/TRAINING PROGRAM
Payer: MEDICARE

## 2024-12-12 VITALS
RESPIRATION RATE: 16 BRPM | HEART RATE: 66 BPM | TEMPERATURE: 97.9 F | OXYGEN SATURATION: 95 % | SYSTOLIC BLOOD PRESSURE: 146 MMHG | BODY MASS INDEX: 27.24 KG/M2 | WEIGHT: 183.9 LBS | HEIGHT: 69 IN | DIASTOLIC BLOOD PRESSURE: 95 MMHG

## 2024-12-12 DIAGNOSIS — M54.50 ACUTE RIGHT-SIDED LOW BACK PAIN WITHOUT SCIATICA: ICD-10-CM

## 2024-12-12 LAB
ALBUMIN SERPL BCG-MCNC: 4.3 G/DL (ref 3.5–5.2)
ALP SERPL-CCNC: 73 U/L (ref 40–150)
ALT SERPL W P-5'-P-CCNC: 13 U/L (ref 0–70)
ANION GAP SERPL CALCULATED.3IONS-SCNC: 9 MMOL/L (ref 7–15)
AST SERPL W P-5'-P-CCNC: 14 U/L (ref 0–45)
BASOPHILS # BLD AUTO: 0 10E3/UL (ref 0–0.2)
BASOPHILS NFR BLD AUTO: 1 %
BILIRUB SERPL-MCNC: 0.6 MG/DL
BUN SERPL-MCNC: 15.9 MG/DL (ref 8–23)
CALCIUM SERPL-MCNC: 9.2 MG/DL (ref 8.8–10.4)
CHLORIDE SERPL-SCNC: 102 MMOL/L (ref 98–107)
CREAT SERPL-MCNC: 0.94 MG/DL (ref 0.67–1.17)
EGFRCR SERPLBLD CKD-EPI 2021: 83 ML/MIN/1.73M2
EOSINOPHIL # BLD AUTO: 0.2 10E3/UL (ref 0–0.7)
EOSINOPHIL NFR BLD AUTO: 3 %
ERYTHROCYTE [DISTWIDTH] IN BLOOD BY AUTOMATED COUNT: 13.8 % (ref 10–15)
ERYTHROCYTE [SEDIMENTATION RATE] IN BLOOD BY WESTERGREN METHOD: 14 MM/HR (ref 0–20)
GLUCOSE SERPL-MCNC: 106 MG/DL (ref 70–99)
HCO3 SERPL-SCNC: 26 MMOL/L (ref 22–29)
HCT VFR BLD AUTO: 44.1 % (ref 40–53)
HGB BLD-MCNC: 14.6 G/DL (ref 13.3–17.7)
IMM GRANULOCYTES # BLD: 0 10E3/UL
IMM GRANULOCYTES NFR BLD: 0 %
LYMPHOCYTES # BLD AUTO: 2 10E3/UL (ref 0.8–5.3)
LYMPHOCYTES NFR BLD AUTO: 28 %
MCH RBC QN AUTO: 29.1 PG (ref 26.5–33)
MCHC RBC AUTO-ENTMCNC: 33.1 G/DL (ref 31.5–36.5)
MCV RBC AUTO: 88 FL (ref 78–100)
MONOCYTES # BLD AUTO: 0.6 10E3/UL (ref 0–1.3)
MONOCYTES NFR BLD AUTO: 8 %
NEUTROPHILS # BLD AUTO: 4.3 10E3/UL (ref 1.6–8.3)
NEUTROPHILS NFR BLD AUTO: 61 %
NRBC # BLD AUTO: 0 10E3/UL
NRBC BLD AUTO-RTO: 0 /100
PLATELET # BLD AUTO: 330 10E3/UL (ref 150–450)
POTASSIUM SERPL-SCNC: 4.2 MMOL/L (ref 3.4–5.3)
PROT SERPL-MCNC: 7.3 G/DL (ref 6.4–8.3)
RBC # BLD AUTO: 5.02 10E6/UL (ref 4.4–5.9)
SODIUM SERPL-SCNC: 137 MMOL/L (ref 135–145)
WBC # BLD AUTO: 7.1 10E3/UL (ref 4–11)

## 2024-12-12 PROCEDURE — 250N000013 HC RX MED GY IP 250 OP 250 PS 637: Performed by: STUDENT IN AN ORGANIZED HEALTH CARE EDUCATION/TRAINING PROGRAM

## 2024-12-12 PROCEDURE — 80053 COMPREHEN METABOLIC PANEL: CPT | Performed by: STUDENT IN AN ORGANIZED HEALTH CARE EDUCATION/TRAINING PROGRAM

## 2024-12-12 PROCEDURE — 85652 RBC SED RATE AUTOMATED: CPT | Performed by: STUDENT IN AN ORGANIZED HEALTH CARE EDUCATION/TRAINING PROGRAM

## 2024-12-12 PROCEDURE — 85004 AUTOMATED DIFF WBC COUNT: CPT | Performed by: STUDENT IN AN ORGANIZED HEALTH CARE EDUCATION/TRAINING PROGRAM

## 2024-12-12 PROCEDURE — 36415 COLL VENOUS BLD VENIPUNCTURE: CPT | Performed by: STUDENT IN AN ORGANIZED HEALTH CARE EDUCATION/TRAINING PROGRAM

## 2024-12-12 PROCEDURE — 99283 EMERGENCY DEPT VISIT LOW MDM: CPT

## 2024-12-12 RX ORDER — CYCLOBENZAPRINE HCL 10 MG
10 TABLET ORAL 3 TIMES DAILY PRN
Qty: 12 TABLET | Refills: 0 | Status: SHIPPED | OUTPATIENT
Start: 2024-12-12

## 2024-12-12 RX ORDER — ACETAMINOPHEN 325 MG/1
650 TABLET ORAL ONCE
Status: COMPLETED | OUTPATIENT
Start: 2024-12-12 | End: 2024-12-12

## 2024-12-12 RX ORDER — CYCLOBENZAPRINE HCL 10 MG
10 TABLET ORAL ONCE
Status: COMPLETED | OUTPATIENT
Start: 2024-12-12 | End: 2024-12-12

## 2024-12-12 RX ADMIN — CYCLOBENZAPRINE 10 MG: 10 TABLET, FILM COATED ORAL at 14:41

## 2024-12-12 RX ADMIN — ACETAMINOPHEN 650 MG: 325 TABLET ORAL at 14:41

## 2024-12-12 ASSESSMENT — ACTIVITIES OF DAILY LIVING (ADL)
ADLS_ACUITY_SCORE: 50

## 2024-12-12 ASSESSMENT — COLUMBIA-SUICIDE SEVERITY RATING SCALE - C-SSRS
6. HAVE YOU EVER DONE ANYTHING, STARTED TO DO ANYTHING, OR PREPARED TO DO ANYTHING TO END YOUR LIFE?: NO
2. HAVE YOU ACTUALLY HAD ANY THOUGHTS OF KILLING YOURSELF IN THE PAST MONTH?: NO
1. IN THE PAST MONTH, HAVE YOU WISHED YOU WERE DEAD OR WISHED YOU COULD GO TO SLEEP AND NOT WAKE UP?: NO

## 2024-12-12 NOTE — ED TRIAGE NOTES
Pt reports lower right back pain that has been going on for a month. Denies trauma. Bending over makes it worse.      Triage Assessment (Adult)       Row Name 12/12/24 7263          Triage Assessment    Airway WDL WDL        Respiratory WDL    Respiratory WDL WDL        Skin Circulation/Temperature WDL    Skin Circulation/Temperature WDL WDL        Cardiac WDL    Cardiac WDL WDL        Peripheral/Neurovascular WDL    Peripheral Neurovascular WDL WDL        Cognitive/Neuro/Behavioral WDL    Cognitive/Neuro/Behavioral WDL WDL

## 2024-12-12 NOTE — DISCHARGE INSTRUCTIONS
Follow up with the spine center as soon as possible  Recommended tylenol- 650 mg every 4-6 hours, max 3000 mg in 24 hours  Can take flexeril as needed (muscle relaxer)- do not drive or drink alcohol with this  Return to the Emergency Room with significant increased back pain, bowel/bladder changes, abdominal pain, numbness/weakness to legs, fever or other worsening symptoms or concerns

## 2024-12-12 NOTE — ED PROVIDER NOTES
NAME: Agustin Nolan  AGE: 79 year old male  YOB: 1945  MRN: [id number]  EVALUATION DATE & TIME: 2024  1:52 PM    PCP: Vicente Tomlin  ED PROVIDER: Shruthi Haywood MD.    Chief Complaint   Patient presents with    Back Pain       FINAL IMPRESSION:  1. Acute right-sided low back pain without sciatica        MEDICAL DECISION MAKIN:59 PM I met with the patient, obtained history, performed an initial exam, and discussed options and plan for diagnostics and treatment here in the ED.   4:21 PM I rechecked and updated the patient on results.      78 y/o M with h/o HLP, T2DM, prostate cancer who presents to the Emergency Department for evaluation of back pain. Pain is in the right low back and is worse with movement. Dx includes but not limited to muscular strain or spasm, degenerative joint disease, acute herniated disc, sciatica, fracture, epidural abscess/hematoma, discitis, osteomyelitis, malignancy, etc. No urinary symptoms or flank pain to suggest kidney stone/UTI. No abdominal pain to suggest intraabdominal pathology. CBC, CMP, ESR are all reassuring. Given reassuring neuro exam here, no systemic symptoms (afebrile with normal ESR) and no midline tenderness lower suspicion for fracture, infectious spinal etiology, malignancy, cauda equina and do not feel needs emergent MRI. However did offer to get this done today given patient's age/comorbidities but he is in\ agreement with discharge nad follow up with the spine center. He was given flexeril and tylenol here with some improvement in pain. Strict return precautions discussed and patient is in agreement with plan, endorses understanding and his questions were answered.    Medical Decision Making  Obtained supplemental history:Supplemental history obtained?: No  Reviewed external records: External records reviewed?: Documented in chart  Care impacted by chronic illness:Cancer/Chemotherapy, Diabetes, and Hyperlipidemia  Did you consider but  "not order tests?: Work up considered but not performed and documented in chart, if applicable  Did you interpret images independently?: Independent interpretation of ECG and images noted in documentation, when applicable.  Consultation discussion with other provider:Did you involve another provider (consultant, , pharmacy, etc.)?: No  Discharge. I prescribed additional prescription strength medication(s) as charted. I considered admission, but ultimately discharged patient given improvement in symptoms, no neurologic red flags, reassuring labs.    MIPS: Not Applicable    MEDICATIONS GIVEN IN THE EMERGENCY:  Medications   cyclobenzaprine (FLEXERIL) tablet 10 mg (10 mg Oral $Given 12/12/24 1441)   acetaminophen (TYLENOL) tablet 650 mg (650 mg Oral $Given 12/12/24 1441)       NEW PRESCRIPTIONS STARTED AT TODAY'S ER VISIT:  New Prescriptions    CYCLOBENZAPRINE (FLEXERIL) 10 MG TABLET    Take 1 tablet (10 mg) by mouth 3 times daily as needed for muscle spasms.        =================================================================  HPI    Patient information was obtained from: patient   Use of : N/A       Agustin Nolan is a 79 year old male with a pertinent medical history of prostate cancer, T2DM, and hyperlipidemia who presents with back pain.     About 3 weeks ago the patient was operating an 18 foot  cleaning his gutters that \"took off like a rocket\" when he pressed the trigger. Since then, he has had a relatively constant \"localized\" right lower back pain. The level of pain varies with position changes and he is able to move in a way that makes it more painful. He has had successful pain relief when rolling out his back on an exercise ball, using a heating pad, massaging the area, and keeping his motorcycle back brace on. He does add that if he does activity during the day it is more sore later at night. He tried taking pain medications once from a previous prescription that \"put me to " "sleep\". No prior history of back surgeries. He does have diabetes but says that it is controlled with Metformin. He had prostate cancer but no changes in urinary symptoms.     He denies fever, abdominal pain, urinary changes, saddle anesthesia, incontinence, leg weakness, leg swelling, or any other complaints at this time.       Per chart review, the patient called his PCP earlier today complaining of a few weeks of localized \"nasty\" right lower back pain. He was unable to get an appointment and was instructed to go to a [facility]. The patient is familiar with this ED and opted to come here.     PHYSICAL EXAM:    Vitals: /77   Pulse 65   Temp 97.6  F (36.4  C) (Oral)   Resp 16   Ht 1.753 m (5' 9\")   Wt 83.4 kg (183 lb 14.4 oz)   SpO2 95%   BMI 27.16 kg/m     HENT: Normocephalic, atraumatic. Neck-gross ROM intact.   Eyes: Pupils mid-range, sclera white  Respiratory: No respiratory distress  Cardiovascular: Normal heart rate. Extremities well perfsued.  GI: Soft, not distended, non tender, no guarding  Musculoskeletal: Moving extremities intentionally and without pain. No obvious deformity.  Back: no midline spinal tenderness or deformities. Pinpoint spot of tendernes in the right low paraspinal muscles  Skin: Warm, dry, no rash.  Neurologic: Alert & oriented, speech clear, strength 5/5 and sensation to light touch intactc in the lower extremities     LAB:  All pertinent labs reviewed and interpreted.  Labs Ordered and Resulted from Time of ED Arrival to Time of ED Departure   COMPREHENSIVE METABOLIC PANEL - Abnormal       Result Value    Sodium 137      Potassium 4.2      Carbon Dioxide (CO2) 26      Anion Gap 9      Urea Nitrogen 15.9      Creatinine 0.94      GFR Estimate 83      Calcium 9.2      Chloride 102      Glucose 106 (*)     Alkaline Phosphatase 73      AST 14      ALT 13      Protein Total 7.3      Albumin 4.3      Bilirubin Total 0.6     ERYTHROCYTE SEDIMENTATION RATE AUTO - " Normal    Erythrocyte Sedimentation Rate 14     CBC WITH PLATELETS AND DIFFERENTIAL    WBC Count 7.1      RBC Count 5.02      Hemoglobin 14.6      Hematocrit 44.1      MCV 88      MCH 29.1      MCHC 33.1      RDW 13.8      Platelet Count 330      % Neutrophils 61      % Lymphocytes 28      % Monocytes 8      % Eosinophils 3      % Basophils 1      % Immature Granulocytes 0      NRBCs per 100 WBC 0      Absolute Neutrophils 4.3      Absolute Lymphocytes 2.0      Absolute Monocytes 0.6      Absolute Eosinophils 0.2      Absolute Basophils 0.0      Absolute Immature Granulocytes 0.0      Absolute NRBCs 0.0           I, Patrick Lira, am serving as a scribe to document services personally performed by Dr. Shruthi Haywood based on my observation and the provider's statements to me. I, Shruthi Haywood MD attest that Patrick Lira is acting in a scribe capacity, has observed my performance of the services and has documented them in accordance with my direction.    Shruthi Haywood M.D.  Emergency Medicine  [facility] EMERGENCY DEPARTMENT  [address] [place] [id number] [phone]  Dept: [phone]       Shruthi Haywood MD  12/12/24 9837

## 2024-12-24 DIAGNOSIS — E11.9 DIABETES MELLITUS, TYPE 2 (H): ICD-10-CM

## 2024-12-24 NOTE — TELEPHONE ENCOUNTER
Blood glucose (Accu-chek) test strips    Last Office Visit: 7/17/24  [facility] [place]: None  Medication last refilled: 6/25/24 #100 with 1 refill(s)    Prescription approved per [facility] Refill Protocol.    Nereida Ohara, LATOYAN, RN, CCM

## 2025-01-02 ENCOUNTER — OFFICE VISIT (OUTPATIENT)
Dept: UROLOGY | Facility: CLINIC | Age: 80
End: 2025-01-02
Payer: MEDICARE

## 2025-01-02 ENCOUNTER — MYC MEDICAL ADVICE (OUTPATIENT)
Dept: UROLOGY | Facility: CLINIC | Age: 80
End: 2025-01-02

## 2025-01-02 VITALS — HEART RATE: 88 BPM | SYSTOLIC BLOOD PRESSURE: 144 MMHG | OXYGEN SATURATION: 97 % | DIASTOLIC BLOOD PRESSURE: 76 MMHG

## 2025-01-02 DIAGNOSIS — Z85.46 PERSONAL HISTORY OF MALIGNANT NEOPLASM OF PROSTATE: ICD-10-CM

## 2025-01-02 DIAGNOSIS — N52.9 ERECTILE DYSFUNCTION, UNSPECIFIED ERECTILE DYSFUNCTION TYPE: ICD-10-CM

## 2025-01-02 DIAGNOSIS — Z85.46 PERSONAL HISTORY OF MALIGNANT NEOPLASM OF PROSTATE: Primary | ICD-10-CM

## 2025-01-02 DIAGNOSIS — C61 PROSTATE CANCER (H): Primary | ICD-10-CM

## 2025-01-02 DIAGNOSIS — N52.9 ERECTILE DYSFUNCTION, UNSPECIFIED ERECTILE DYSFUNCTION TYPE: Primary | ICD-10-CM

## 2025-01-02 LAB — PSA SERPL-MCNC: <0.04 UG/L (ref 0–4)

## 2025-01-02 RX ORDER — SILDENAFIL 100 MG/1
100 TABLET, FILM COATED ORAL DAILY PRN
Qty: 10 TABLET | Refills: 5 | Status: SHIPPED | OUTPATIENT
Start: 2025-01-02

## 2025-01-02 NOTE — PROGRESS NOTES
Office Visit Note  [facility]  [phone]    UROLOGIC DIAGNOSES:   pT2 Amber 3+4 = 7 prostate cancer    CURRENT INTERVENTIONS:   Robotic prostatectomy December 2023    HISTORY:   Agustin returns for prostate cancer follow-up.  His PSA remains undetectable      PAST MEDICAL HISTORY:   Past Medical History:   Diagnosis Date    Esophageal reflux     Medicare annual wellness visit, subsequent 03/25/2018       PAST SURGICAL HISTORY:   Past Surgical History:   Procedure Laterality Date    APPENDECTOMY      cystoscopy with pyeloscopy with removal of calculus.      DAVINCI LYSIS OF ADHESIONS N/A 12/4/2023    Procedure: Davinci Lysis of Adhesions;  Surgeon: Kamlesh Henry MD;  Location: [facility] OR    DAVINCI PROSTATECTOMY, LYMPHADENECTOMY Bilateral 12/4/2023    Procedure: PROSTATECTOMY, ROBOT-ASSISTED, USING DA AIDA XI, WITH PELVIC LYMPHADENECTOMY, LYSIS OF ADHESIONS;  Surgeon: Kamlesh Henry MD;  Location: [facility] OR    PROSTATE SURGERY      ROTATOR CUFF REPAIR RT/LT      surgery testis exploration of undescended.  1985    TESTICLE SURGERY      VASECTOMY         FAMILY HISTORY: History reviewed. No pertinent family history.    SOCIAL HISTORY:   Social History     Socioeconomic History    Marital status:      Spouse name: None    Number of children: None    Years of education: None    Highest education level: None   Tobacco Use    Smoking status: Never    Smokeless tobacco: Never   Substance and Sexual Activity    Alcohol use: Yes     Alcohol/week: 1.0 standard drink of alcohol     Types: 1 Standard drinks or equivalent per week     Comment: occasional    Drug use: No    Sexual activity: Never     Social Drivers of Health     Financial Resource Strain: Unknown (7/15/2024)    Financial Resource Strain     Within the past 12 months, have you or your family members you live with been unable to get utilities (heat, electricity) when it was really needed?: Patient declined   Food Insecurity:  Unknown (7/15/2024)    Food Insecurity     Within the past 12 months, did you worry that your food would run out before you got money to buy more?: Patient declined     Within the past 12 months, did the food you bought just not last and you didn t have money to get more?: Patient declined   Transportation Needs: Unknown (7/15/2024)    Transportation Needs     Within the past 12 months, has lack of transportation kept you from medical appointments, getting your medicines, non-medical meetings or appointments, work, or from getting things that you need?: Patient declined   Physical Activity: Inactive (7/15/2024)    Exercise Vital Sign     Days of Exercise per Week: 0 days     Minutes of Exercise per Session: 0 min   Stress: No Stress Concern Present (7/15/2024)    [employer] [place] [employer] - Occupational Stress Questionnaire     Feeling of Stress : Not at all   Social Connections: Unknown (7/15/2024)    Social Connection and Isolation Panel [NHANES]     Frequency of Social Gatherings with Friends and Family: Twice a week   Interpersonal Safety: Low Risk  (11/28/2023)    Interpersonal Safety     Do you feel physically and emotionally safe where you currently live?: Yes     Within the past 12 months, have you been hit, slapped, kicked or otherwise physically hurt by someone?: Patient refused     Within the past 12 months, have you been humiliated or emotionally abused in other ways by your partner or ex-partner?: Patient refused   Housing Stability: Unknown (7/15/2024)    Housing Stability     Do you have housing? : Patient declined     Are you worried about losing your housing?: Patient declined       Review Of Systems:  Skin: No rash, pruritis, or skin pigmentation  Eyes: No changes in vision  Ears/Nose/Throat: No changes in hearing, no nosebleeds  Respiratory: No shortness of breath, dyspnea on exertion, cough, or hemoptysis  Cardiovascular: No chest pain or palpitations  Gastrointestinal: No diarrhea  or constipation. No abdominal pain. No hematochezia  Genitourinary: see HPI  Musculoskeletal: No pain or swelling of joints, normal range of motion  Neurologic: No weakness or tremors  Psychiatric: No recent changes in memory or mood  Hematologic/Lymphatic/Immunologic: No easy bruising or enlarged lymph nodes  Endocrine: No weight gain or loss      PHYSICAL EXAM:    BP (!) 144/76   Pulse 88   SpO2 97%     Constitutional: Well developed. Conversant and in no acute distress  Eyes: Anicteric sclera, conjunctiva clear, normal extraocular movements  ENT: Normocephalic and atraumatic,   Skin: Warm and dry. No rashes or lesions  Cardiac: No peripheral edema  Back/Flank: Not done  CNS/PNS: Normal musculature and movements, moves all extremities normally  Respiratory: Normal non-labored breathing  Abdomen: Soft nontender and nondistended  Peripheral Vascular: No peripheral edema  Mental Status/Psych: Alert and Oriented x 3. Normal mood and affect    Penis: Not done  Scrotal Skin: Not done  Testicles: Not done  Epididymis: Not done  Digital Rectal Exam:     Cystoscopy: Not done    Imaging: None    Urinalysis: UA RESULTS:  Recent Labs   Lab Test 01/22/24  1448 12/08/23  1801 04/20/23  1054   COLOR Yellow   < > Yellow   APPEARANCE Clear   < > Clear   URINEGLC Negative   < > 500*   URINEBILI Negative   < > Negative   URINEKETONE Trace*   < > Trace*   SG 1.031*   < > >=1.030   UBLD Negative   < > Negative   URINEPH 5.0   < > 5.5   PROTEIN 100*   < > 30*   UROBILINOGEN  --   --  0.2   NITRITE Negative   < > Negative   LEUKEST 75 Morgan/uL*   < > Negative   RBCU 10*   < >  --    WBCU 23*   < >  --     < > = values in this interval not displayed.       PSA: Undetectable    Post Void Residual:     Other labs: None today      IMPRESSION:  Doing well, PSA undetectable    PLAN:  He continues to do well from a prostate cancer standpoint.  His PSA remains undetectable.  I counseled him that he can move on to annual surveillance.  He states  that he would prefer to check the PSA every 6 months for now.  I will have him see our advanced practice provider in 6 months for his next PSA check.  If the PSA remains undetectable he can continue with annual PSA surveillance either in our office or with his primary care provider.    He also asked me about erectile dysfunction today.  He did not have a nerve sparing procedure performed with his surgery.  He was counseled that it is unlikely he will have erectile function without penile prostate implantation but he could still try Viagra or intracavernosal injections.  He would like to try Viagra.  I gave him a prescription for 100 mg Viagra tablets and gave him instructions on proper use.          Kamlesh Henry M.D.

## 2025-01-02 NOTE — LETTER
1/2/2025       RE: Agustin Nolan  [address] [place] [address]     Dear Colleague,    Thank you for referring your patient, Agustin Nolan, to the [facility] at [facility]. Please see a copy of my visit note below.    Office Visit Note  [facility]  [phone]    UROLOGIC DIAGNOSES:   pT2 Amber 3+4 = 7 prostate cancer    CURRENT INTERVENTIONS:   Robotic prostatectomy December 2023    HISTORY:   Agustin returns for prostate cancer follow-up.  His PSA remains undetectable      PAST MEDICAL HISTORY:   Past Medical History:   Diagnosis Date     Esophageal reflux      Medicare annual wellness visit, subsequent 03/25/2018       PAST SURGICAL HISTORY:   Past Surgical History:   Procedure Laterality Date     APPENDECTOMY       cystoscopy with pyeloscopy with removal of calculus.       DAVINCI LYSIS OF ADHESIONS N/A 12/4/2023    Procedure: Davinci Lysis of Adhesions;  Surgeon: Kamlesh Henry MD;  Location: [facility] OR     DAVINCI PROSTATECTOMY, LYMPHADENECTOMY Bilateral 12/4/2023    Procedure: PROSTATECTOMY, ROBOT-ASSISTED, USING DA AIDA XI, WITH PELVIC LYMPHADENECTOMY, LYSIS OF ADHESIONS;  Surgeon: Kamlesh Henry MD;  Location: [facility] OR     PROSTATE SURGERY       ROTATOR CUFF REPAIR RT/LT       surgery testis exploration of undescended.  1985     TESTICLE SURGERY       VASECTOMY         FAMILY HISTORY: History reviewed. No pertinent family history.    SOCIAL HISTORY:   Social History     Socioeconomic History     Marital status:      Spouse name: None     Number of children: None     Years of education: None     Highest education level: None   Tobacco Use     Smoking status: Never     Smokeless tobacco: Never   Substance and Sexual Activity     Alcohol use: Yes     Alcohol/week: 1.0 standard drink of alcohol     Types: 1 Standard drinks or equivalent per week     Comment: occasional     Drug use: No      Sexual activity: Never     Social Drivers of Health     Financial Resource Strain: Unknown (7/15/2024)    Financial Resource Strain      Within the past 12 months, have you or your family members you live with been unable to get utilities (heat, electricity) when it was really needed?: Patient declined   Food Insecurity: Unknown (7/15/2024)    Food Insecurity      Within the past 12 months, did you worry that your food would run out before you got money to buy more?: Patient declined      Within the past 12 months, did the food you bought just not last and you didn t have money to get more?: Patient declined   Transportation Needs: Unknown (7/15/2024)    Transportation Needs      Within the past 12 months, has lack of transportation kept you from medical appointments, getting your medicines, non-medical meetings or appointments, work, or from getting things that you need?: Patient declined   Physical Activity: Inactive (7/15/2024)    Exercise Vital Sign      Days of Exercise per Week: 0 days      Minutes of Exercise per Session: 0 min   Stress: No Stress Concern Present (7/15/2024)    [employer] [place] of Occupational Health - Occupational Stress Questionnaire      Feeling of Stress : Not at all   Social Connections: Unknown (7/15/2024)    Social Connection and Isolation Panel [NHANES]      Frequency of Social Gatherings with Friends and Family: Twice a week   Interpersonal Safety: Low Risk  (11/28/2023)    Interpersonal Safety      Do you feel physically and emotionally safe where you currently live?: Yes      Within the past 12 months, have you been hit, slapped, kicked or otherwise physically hurt by someone?: Patient refused      Within the past 12 months, have you been humiliated or emotionally abused in other ways by your partner or ex-partner?: Patient refused   Housing Stability: Unknown (7/15/2024)    Housing Stability      Do you have housing? : Patient declined      Are you worried about losing your  housing?: Patient declined       Review Of Systems:  Skin: No rash, pruritis, or skin pigmentation  Eyes: No changes in vision  Ears/Nose/Throat: No changes in hearing, no nosebleeds  Respiratory: No shortness of breath, dyspnea on exertion, cough, or hemoptysis  Cardiovascular: No chest pain or palpitations  Gastrointestinal: No diarrhea or constipation. No abdominal pain. No hematochezia  Genitourinary: see HPI  Musculoskeletal: No pain or swelling of joints, normal range of motion  Neurologic: No weakness or tremors  Psychiatric: No recent changes in memory or mood  Hematologic/Lymphatic/Immunologic: No easy bruising or enlarged lymph nodes  Endocrine: No weight gain or loss      PHYSICAL EXAM:    BP (!) 144/76   Pulse 88   SpO2 97%     Constitutional: Well developed. Conversant and in no acute distress  Eyes: Anicteric sclera, conjunctiva clear, normal extraocular movements  ENT: Normocephalic and atraumatic,   Skin: Warm and dry. No rashes or lesions  Cardiac: No peripheral edema  Back/Flank: Not done  CNS/PNS: Normal musculature and movements, moves all extremities normally  Respiratory: Normal non-labored breathing  Abdomen: Soft nontender and nondistended  Peripheral Vascular: No peripheral edema  Mental Status/Psych: Alert and Oriented x 3. Normal mood and affect    Penis: Not done  Scrotal Skin: Not done  Testicles: Not done  Epididymis: Not done  Digital Rectal Exam:     Cystoscopy: Not done    Imaging: None    Urinalysis: UA RESULTS:  Recent Labs   Lab Test 01/22/24  1448 12/08/23  1801 04/20/23  1054   COLOR Yellow   < > Yellow   APPEARANCE Clear   < > Clear   URINEGLC Negative   < > 500*   URINEBILI Negative   < > Negative   URINEKETONE Trace*   < > Trace*   SG 1.031*   < > >=1.030   UBLD Negative   < > Negative   URINEPH 5.0   < > 5.5   PROTEIN 100*   < > 30*   UROBILINOGEN  --   --  0.2   NITRITE Negative   < > Negative   LEUKEST 75 [place]/uL*   < > Negative   RBCU 10*   < >  --    WBCU 23*   < >  --      < > = values in this interval not displayed.       PSA: Undetectable    Post Void Residual:     Other labs: None today      IMPRESSION:  Doing well, PSA undetectable    PLAN:  He continues to do well from a prostate cancer standpoint.  His PSA remains undetectable.  I counseled him that he can move on to annual surveillance.  He states that he would prefer to check the PSA every 6 months for now.  I will have him see our advanced practice provider in 6 months for his next PSA check.  If the PSA remains undetectable he can continue with annual PSA surveillance either in our office or with his primary care provider.    He also asked me about erectile dysfunction today.  He did not have a nerve sparing procedure performed with his surgery.  He was counseled that it is unlikely he will have erectile function without penile prostate implantation but he could still try Viagra or intracavernosal injections.  He would like to try Viagra.  I gave him a prescription for 100 mg Viagra tablets and gave him instructions on proper use.          Kamlesh Henry M.D.              Again, thank you for allowing me to participate in the care of your patient.      Sincerely,    Kamlesh Henry MD

## 2025-01-06 RX ORDER — SILDENAFIL 100 MG/1
100 TABLET, FILM COATED ORAL DAILY PRN
Qty: 5 TABLET | Refills: 0 | Status: SHIPPED | OUTPATIENT
Start: 2025-01-06 | End: 2025-01-11

## 2025-02-02 ENCOUNTER — HEALTH MAINTENANCE LETTER (OUTPATIENT)
Age: 80
End: 2025-02-02

## 2025-02-03 DIAGNOSIS — E11.9 TYPE 2 DIABETES MELLITUS WITHOUT COMPLICATION, WITHOUT LONG-TERM CURRENT USE OF INSULIN (H): ICD-10-CM

## 2025-02-03 RX ORDER — METFORMIN HYDROCHLORIDE 500 MG/1
TABLET, EXTENDED RELEASE ORAL
Qty: 180 TABLET | Refills: 1 | Status: SHIPPED | OUTPATIENT
Start: 2025-02-03

## 2025-02-03 NOTE — TELEPHONE ENCOUNTER
Medication requested: metFORMIN (GLUCOPHAGE XR) 500 MG 24 hr tablet   Last office visit: 7/17/24  [facility] appointments: none  Medication last refilled: 8/13/24; 180 + 1 refill  Last qualifying labs:   Component      Latest Ref Rng 12/12/2024  3:29 PM   GFR Estimate      >60 mL/min/1.73m2 83      Component      Latest Ref Rng 7/17/2024  2:12 PM   Hemoglobin A1C      0.0 - 5.6 % 6.6 (H)      Prescription approved per [facility] Refill Protocol.    Sagar LINN, RN  02/03/25 4:13 PM

## 2025-03-26 DIAGNOSIS — K21.00 GASTROESOPHAGEAL REFLUX DISEASE WITH ESOPHAGITIS: ICD-10-CM

## 2025-03-26 RX ORDER — OMEPRAZOLE 20 MG/1
20 CAPSULE, DELAYED RELEASE ORAL DAILY
Qty: 90 CAPSULE | Refills: 1 | Status: SHIPPED | OUTPATIENT
Start: 2025-03-26

## 2025-03-26 NOTE — TELEPHONE ENCOUNTER
Medication requested: omeprazole (PRILOSEC) 20 MG DR capsule   Last office visit: 7/17/24  [facility] appointments: none  Medication last refilled: 10/1/24; 90 + 1 refill  Last qualifying labs: N/A    Prescription approved per [facility] Refill Protocol.    Sagar LINN, RN  03/26/25 2:37 PM

## 2025-07-21 PROCEDURE — 82465 ASSAY BLD/SERUM CHOLESTEROL: CPT | Mod: ORL | Performed by: FAMILY MEDICINE

## 2025-07-21 PROCEDURE — 82043 UR ALBUMIN QUANTITATIVE: CPT | Mod: ORL | Performed by: FAMILY MEDICINE

## 2025-07-21 PROCEDURE — 84153 ASSAY OF PSA TOTAL: CPT | Mod: ORL | Performed by: FAMILY MEDICINE

## (undated) DEVICE — DAVINCI XI DRAPE ARM 470015

## (undated) DEVICE — CLIP ENDO HEMO-LOC PURPLE LG 544240

## (undated) DEVICE — DRAPE MAYO STAND 23X54 8337

## (undated) DEVICE — ENDO POUCH UNIV RETRIEVAL SYSTEM INZII 10MM CD001

## (undated) DEVICE — SOL WATER IRRIG 1000ML BOTTLE 2F7114

## (undated) DEVICE — ENDO TROCAR CONMED AIRSEAL BLADELESS 12X120MM IAS12-120LP

## (undated) DEVICE — SPONGE RAY-TEC 4X8" 7318

## (undated) DEVICE — SU DERMABOND ADVANCED .7ML DNX12

## (undated) DEVICE — SU WND CLOSURE V-LOC 3-0 CV-23 6" VLOCM1904

## (undated) DEVICE — DAVINCI HOT SHEARS TIP COVER  400180

## (undated) DEVICE — TUBING CONMED AIRSEAL SMOKE EVAC INSUFFLATION ASM-EVAC

## (undated) DEVICE — DAVINCI XI DRAPE COLUMN 470341

## (undated) DEVICE — SU WND CLOSURE VLOC 90 ABS 3-0 VIOLET 6" CV-23 VLOCM0804

## (undated) DEVICE — ESU GROUND PAD ADULT W/CORD E7507

## (undated) DEVICE — GLOVE BIOGEL PI ULTRATOUCH G SZ 7.5 42175

## (undated) DEVICE — PROTECTOR ARM ONE-STEP TRENDELENBURG 40418

## (undated) DEVICE — SUCTION IRR STRYKERFLOW II W/TIP 250-070-520

## (undated) DEVICE — DAVINCI XI SEAL UNIVERSAL 5-8MM 470361

## (undated) DEVICE — SU SILK 2-0 PSL 18" 673H

## (undated) DEVICE — DRAIN JACKSON PRATT RESERVOIR 100ML SU130-1305

## (undated) DEVICE — SOL NACL 0.9% IRRIG 3000ML BAG 2B7477

## (undated) DEVICE — SOL NACL 0.9% INJ 1000ML BAG 2B1324X

## (undated) DEVICE — LINEN ORTHO PACK 5446

## (undated) DEVICE — PACK DAVINCI UROLOGY SBA15UDFSG

## (undated) DEVICE — KIT PATIENT POSITIONING PIGAZZI LATEX FREE 40580

## (undated) DEVICE — SU MONOCRYL 4-0 PS-2 18" UND Y496G

## (undated) DEVICE — DECANTER VIAL 2006S

## (undated) DEVICE — CATH FOLEY 18FR 5ML SIL

## (undated) DEVICE — PAD CHUX UNDERPAD 30X36" P3036C

## (undated) DEVICE — SU PDS II 1 CT MONOFIL Z353H

## (undated) DEVICE — SU WND CLOSURE VLOC 180 ABS 2-0 9" GS-21 VLOCL0345

## (undated) DEVICE — TAPE CLOTH ADHESIVE 3" LATEX FREE

## (undated) DEVICE — DRAIN JACKSON PRATT 15FR ROUND SU130-1323

## (undated) DEVICE — CATH HOLDER STRAP 36600

## (undated) RX ORDER — ACETAMINOPHEN 325 MG/1
TABLET ORAL
Status: DISPENSED
Start: 2023-12-04

## (undated) RX ORDER — CEFAZOLIN SODIUM/WATER 2 G/20 ML
SYRINGE (ML) INTRAVENOUS
Status: DISPENSED
Start: 2023-12-04

## (undated) RX ORDER — FENTANYL CITRATE 50 UG/ML
INJECTION, SOLUTION INTRAMUSCULAR; INTRAVENOUS
Status: DISPENSED
Start: 2023-12-04

## (undated) RX ORDER — ONDANSETRON 2 MG/ML
INJECTION INTRAMUSCULAR; INTRAVENOUS
Status: DISPENSED
Start: 2023-12-04

## (undated) RX ORDER — BUPIVACAINE HYDROCHLORIDE 2.5 MG/ML
INJECTION, SOLUTION EPIDURAL; INFILTRATION; INTRACAUDAL
Status: DISPENSED
Start: 2023-12-04

## (undated) RX ORDER — OXYCODONE HYDROCHLORIDE 5 MG/1
TABLET ORAL
Status: DISPENSED
Start: 2023-12-04